# Patient Record
Sex: FEMALE | Race: WHITE | NOT HISPANIC OR LATINO | Employment: FULL TIME | URBAN - METROPOLITAN AREA
[De-identification: names, ages, dates, MRNs, and addresses within clinical notes are randomized per-mention and may not be internally consistent; named-entity substitution may affect disease eponyms.]

---

## 2019-07-26 ENCOUNTER — HOSPITAL ENCOUNTER (OUTPATIENT)
Facility: MEDICAL CENTER | Age: 24
End: 2019-07-26
Attending: NURSE PRACTITIONER
Payer: COMMERCIAL

## 2019-07-26 ENCOUNTER — EH NON-PROVIDER (OUTPATIENT)
Dept: OCCUPATIONAL MEDICINE | Facility: CLINIC | Age: 24
End: 2019-07-26

## 2019-07-26 ENCOUNTER — EMPLOYEE HEALTH (OUTPATIENT)
Dept: OCCUPATIONAL MEDICINE | Facility: CLINIC | Age: 24
End: 2019-07-26

## 2019-07-26 VITALS
DIASTOLIC BLOOD PRESSURE: 84 MMHG | SYSTOLIC BLOOD PRESSURE: 118 MMHG | HEART RATE: 88 BPM | OXYGEN SATURATION: 100 % | WEIGHT: 178 LBS | RESPIRATION RATE: 14 BRPM | HEIGHT: 62 IN | BODY MASS INDEX: 32.76 KG/M2

## 2019-07-26 DIAGNOSIS — Z02.89 VISIT FOR OCCUPATIONAL HEALTH EXAMINATION: ICD-10-CM

## 2019-07-26 DIAGNOSIS — Z02.1 PRE-EMPLOYMENT HEALTH SCREENING EXAMINATION: ICD-10-CM

## 2019-07-26 DIAGNOSIS — Z02.89 VISIT FOR OCCUPATIONAL HEALTH EXAMINATION: Primary | ICD-10-CM

## 2019-07-26 LAB
AMP AMPHETAMINE: NORMAL
BAR BARBITURATES: NORMAL
BZO BENZODIAZEPINES: NORMAL
COC COCAINE: NORMAL
INT CON NEG: NORMAL
INT CON POS: NORMAL
MDMA ECSTASY: NORMAL
MET METHAMPHETAMINES: NORMAL
MTD METHADONE: NORMAL
OPI OPIATES: NORMAL
OXY OXYCODONE: NORMAL
PCP PHENCYCLIDINE: NORMAL
POC URINE DRUG SCREEN OCDRS: NORMAL
THC: NORMAL

## 2019-07-26 PROCEDURE — 8915 PR COMPREHENSIVE PHYSICAL: Performed by: NURSE PRACTITIONER

## 2019-07-26 PROCEDURE — 86480 TB TEST CELL IMMUN MEASURE: CPT | Performed by: NURSE PRACTITIONER

## 2019-07-26 PROCEDURE — 80305 DRUG TEST PRSMV DIR OPT OBS: CPT | Performed by: NURSE PRACTITIONER

## 2019-07-26 RX ORDER — ONDANSETRON 8 MG/1
TABLET, ORALLY DISINTEGRATING ORAL
Refills: 1 | COMMUNITY
Start: 2019-05-17 | End: 2019-11-22

## 2019-07-26 RX ORDER — ALBUTEROL SULFATE 90 UG/1
AEROSOL, METERED RESPIRATORY (INHALATION)
Refills: 6 | COMMUNITY
Start: 2019-06-29 | End: 2019-11-22

## 2019-07-26 RX ORDER — EPINEPHRINE 0.3 MG/.3ML
INJECTION SUBCUTANEOUS
COMMUNITY
End: 2020-03-05 | Stop reason: SDUPTHER

## 2019-07-26 RX ORDER — SULFAMETHOXAZOLE AND TRIMETHOPRIM 200; 40 MG/5ML; MG/5ML
SUSPENSION ORAL
Refills: 0 | COMMUNITY
Start: 2019-05-17 | End: 2019-11-22

## 2019-07-26 RX ORDER — ALBUTEROL SULFATE 2.5 MG/3ML
SOLUTION RESPIRATORY (INHALATION)
COMMUNITY
End: 2020-02-07

## 2019-07-26 RX ORDER — ALBUTEROL SULFATE 90 UG/1
AEROSOL, METERED RESPIRATORY (INHALATION)
COMMUNITY
Start: 2019-01-24 | End: 2019-11-22 | Stop reason: SDUPTHER

## 2019-07-26 RX ORDER — BETAMETHASONE DIPROPIONATE 0.5 MG/G
CREAM TOPICAL
COMMUNITY
End: 2022-09-15

## 2019-07-26 RX ORDER — LEVONORGESTREL AND ETHINYL ESTRADIOL 0.15-0.03
KIT ORAL
Refills: 1 | COMMUNITY
Start: 2019-06-05 | End: 2019-11-22 | Stop reason: SDUPTHER

## 2019-07-26 RX ORDER — LEVONORGESTREL AND ETHINYL ESTRADIOL 0.15-0.03
KIT ORAL
COMMUNITY
End: 2019-11-22

## 2019-07-26 RX ORDER — ALBUTEROL SULFATE 90 UG/1
AEROSOL, METERED RESPIRATORY (INHALATION)
COMMUNITY
Start: 2018-07-16 | End: 2019-11-22

## 2019-07-26 RX ORDER — OXYCODONE HYDROCHLORIDE AND ACETAMINOPHEN 5; 325 MG/1; MG/1
TABLET ORAL
Refills: 0 | COMMUNITY
Start: 2019-05-17 | End: 2019-11-22

## 2019-07-29 LAB
GAMMA INTERFERON BACKGROUND BLD IA-ACNC: 0.02 IU/ML
M TB IFN-G BLD-IMP: NEGATIVE
M TB IFN-G CD4+ BCKGRND COR BLD-ACNC: 0.01 IU/ML
MITOGEN IGNF BCKGRD COR BLD-ACNC: >10 IU/ML
QFT TB2 - NIL TBQ2: 0 IU/ML

## 2019-08-09 ENCOUNTER — EH NON-PROVIDER (OUTPATIENT)
Dept: OCCUPATIONAL MEDICINE | Facility: CLINIC | Age: 24
End: 2019-08-09

## 2019-08-09 ENCOUNTER — APPOINTMENT (OUTPATIENT)
Dept: OCCUPATIONAL MEDICINE | Facility: CLINIC | Age: 24
End: 2019-08-09

## 2019-08-09 DIAGNOSIS — Z71.85 IMMUNIZATION COUNSELING: ICD-10-CM

## 2019-08-20 ENCOUNTER — HOSPITAL ENCOUNTER (OUTPATIENT)
Facility: MEDICAL CENTER | Age: 24
End: 2019-08-20
Payer: COMMERCIAL

## 2019-08-20 LAB
BDY FAT % MEASURED: 33.6 %
BP DIAS: 78 MMHG
BP SYS: 122 MMHG
CHOLEST SERPL-MCNC: 175 MG/DL (ref 100–199)
DIABETES HTDIA: NO
EVENT NAME HTEVT: NORMAL
FASTING HTFAS: YES
FASTING STATUS PATIENT QL REPORTED: NORMAL
GLUCOSE SERPL-MCNC: 88 MG/DL (ref 65–99)
HDLC SERPL-MCNC: 34 MG/DL
HYPERTENSION HTHYP: NO
LDLC SERPL CALC-MCNC: 114 MG/DL
SCREENING LOC CITY HTCIT: NORMAL
SCREENING LOC STATE HTSTA: NORMAL
SCREENING LOCATION HTLOC: NORMAL
SMOKING HTSMO: YES
SUBSCRIBER ID HTSID: NORMAL
TRIGL SERPL-MCNC: 137 MG/DL (ref 0–149)

## 2019-09-19 ENCOUNTER — IMMUNIZATION (OUTPATIENT)
Dept: OCCUPATIONAL MEDICINE | Facility: CLINIC | Age: 24
End: 2019-09-19

## 2019-09-19 DIAGNOSIS — Z23 NEED FOR VACCINATION: ICD-10-CM

## 2019-09-19 PROCEDURE — 90686 IIV4 VACC NO PRSV 0.5 ML IM: CPT | Performed by: PREVENTIVE MEDICINE

## 2019-10-08 ENCOUNTER — TELEPHONE (OUTPATIENT)
Dept: SCHEDULING | Facility: IMAGING CENTER | Age: 24
End: 2019-10-08

## 2019-11-22 ENCOUNTER — HOSPITAL ENCOUNTER (OUTPATIENT)
Facility: MEDICAL CENTER | Age: 24
End: 2019-11-22
Attending: FAMILY MEDICINE
Payer: COMMERCIAL

## 2019-11-22 ENCOUNTER — HOSPITAL ENCOUNTER (OUTPATIENT)
Dept: LAB | Facility: MEDICAL CENTER | Age: 24
End: 2019-11-22
Attending: FAMILY MEDICINE
Payer: COMMERCIAL

## 2019-11-22 ENCOUNTER — OFFICE VISIT (OUTPATIENT)
Dept: MEDICAL GROUP | Facility: LAB | Age: 24
End: 2019-11-22
Payer: COMMERCIAL

## 2019-11-22 VITALS
DIASTOLIC BLOOD PRESSURE: 66 MMHG | HEIGHT: 62 IN | SYSTOLIC BLOOD PRESSURE: 112 MMHG | OXYGEN SATURATION: 96 % | TEMPERATURE: 98 F | WEIGHT: 196 LBS | HEART RATE: 95 BPM | BODY MASS INDEX: 36.07 KG/M2

## 2019-11-22 DIAGNOSIS — R23.2 HOT FLASHES: ICD-10-CM

## 2019-11-22 DIAGNOSIS — Z23 NEED FOR VACCINATION: ICD-10-CM

## 2019-11-22 DIAGNOSIS — Z11.3 SCREEN FOR STD (SEXUALLY TRANSMITTED DISEASE): ICD-10-CM

## 2019-11-22 DIAGNOSIS — G47.09 OTHER INSOMNIA: ICD-10-CM

## 2019-11-22 DIAGNOSIS — J45.20 MILD INTERMITTENT ASTHMA WITHOUT COMPLICATION: ICD-10-CM

## 2019-11-22 LAB
ALBUMIN SERPL BCP-MCNC: 4 G/DL (ref 3.2–4.9)
ALBUMIN/GLOB SERPL: 1.2 G/DL
ALP SERPL-CCNC: 82 U/L (ref 30–99)
ALT SERPL-CCNC: 23 U/L (ref 2–50)
ANION GAP SERPL CALC-SCNC: 9 MMOL/L (ref 0–11.9)
AST SERPL-CCNC: 19 U/L (ref 12–45)
BASOPHILS # BLD AUTO: 0.5 % (ref 0–1.8)
BASOPHILS # BLD: 0.05 K/UL (ref 0–0.12)
BILIRUB SERPL-MCNC: 0.3 MG/DL (ref 0.1–1.5)
BUN SERPL-MCNC: 13 MG/DL (ref 8–22)
CALCIUM SERPL-MCNC: 9 MG/DL (ref 8.5–10.5)
CHLORIDE SERPL-SCNC: 102 MMOL/L (ref 96–112)
CO2 SERPL-SCNC: 27 MMOL/L (ref 20–33)
CREAT SERPL-MCNC: 0.68 MG/DL (ref 0.5–1.4)
EOSINOPHIL # BLD AUTO: 0.31 K/UL (ref 0–0.51)
EOSINOPHIL NFR BLD: 3.2 % (ref 0–6.9)
ERYTHROCYTE [DISTWIDTH] IN BLOOD BY AUTOMATED COUNT: 40.7 FL (ref 35.9–50)
FASTING STATUS PATIENT QL REPORTED: NORMAL
GLOBULIN SER CALC-MCNC: 3.3 G/DL (ref 1.9–3.5)
GLUCOSE SERPL-MCNC: 97 MG/DL (ref 65–99)
HCT VFR BLD AUTO: 45 % (ref 37–47)
HGB BLD-MCNC: 15.4 G/DL (ref 12–16)
IMM GRANULOCYTES # BLD AUTO: 0.03 K/UL (ref 0–0.11)
IMM GRANULOCYTES NFR BLD AUTO: 0.3 % (ref 0–0.9)
LYMPHOCYTES # BLD AUTO: 2.61 K/UL (ref 1–4.8)
LYMPHOCYTES NFR BLD: 27.2 % (ref 22–41)
MCH RBC QN AUTO: 29.7 PG (ref 27–33)
MCHC RBC AUTO-ENTMCNC: 34.2 G/DL (ref 33.6–35)
MCV RBC AUTO: 86.7 FL (ref 81.4–97.8)
MONOCYTES # BLD AUTO: 0.51 K/UL (ref 0–0.85)
MONOCYTES NFR BLD AUTO: 5.3 % (ref 0–13.4)
NEUTROPHILS # BLD AUTO: 6.08 K/UL (ref 2–7.15)
NEUTROPHILS NFR BLD: 63.5 % (ref 44–72)
NRBC # BLD AUTO: 0 K/UL
NRBC BLD-RTO: 0 /100 WBC
PLATELET # BLD AUTO: 416 K/UL (ref 164–446)
PMV BLD AUTO: 8.6 FL (ref 9–12.9)
POTASSIUM SERPL-SCNC: 3.9 MMOL/L (ref 3.6–5.5)
PROT SERPL-MCNC: 7.3 G/DL (ref 6–8.2)
RBC # BLD AUTO: 5.19 M/UL (ref 4.2–5.4)
SODIUM SERPL-SCNC: 138 MMOL/L (ref 135–145)
TSH SERPL DL<=0.005 MIU/L-ACNC: 0.87 UIU/ML (ref 0.38–5.33)
WBC # BLD AUTO: 9.6 K/UL (ref 4.8–10.8)

## 2019-11-22 PROCEDURE — 90732 PPSV23 VACC 2 YRS+ SUBQ/IM: CPT | Performed by: FAMILY MEDICINE

## 2019-11-22 PROCEDURE — 36415 COLL VENOUS BLD VENIPUNCTURE: CPT

## 2019-11-22 PROCEDURE — 84443 ASSAY THYROID STIM HORMONE: CPT

## 2019-11-22 PROCEDURE — 90621 MENB-FHBP VACC 2/3 DOSE IM: CPT | Performed by: FAMILY MEDICINE

## 2019-11-22 PROCEDURE — 90472 IMMUNIZATION ADMIN EACH ADD: CPT | Performed by: FAMILY MEDICINE

## 2019-11-22 PROCEDURE — 99204 OFFICE O/P NEW MOD 45 MIN: CPT | Mod: 25 | Performed by: FAMILY MEDICINE

## 2019-11-22 PROCEDURE — 90651 9VHPV VACCINE 2/3 DOSE IM: CPT | Performed by: FAMILY MEDICINE

## 2019-11-22 PROCEDURE — 90471 IMMUNIZATION ADMIN: CPT | Performed by: FAMILY MEDICINE

## 2019-11-22 PROCEDURE — 85025 COMPLETE CBC W/AUTO DIFF WBC: CPT

## 2019-11-22 PROCEDURE — 80053 COMPREHEN METABOLIC PANEL: CPT

## 2019-11-22 PROCEDURE — 87491 CHLMYD TRACH DNA AMP PROBE: CPT

## 2019-11-22 PROCEDURE — 87591 N.GONORRHOEAE DNA AMP PROB: CPT

## 2019-11-22 RX ORDER — ALBUTEROL SULFATE 90 UG/1
1-2 AEROSOL, METERED RESPIRATORY (INHALATION) EVERY 6 HOURS PRN
Qty: 8.5 G | Refills: 3 | Status: SHIPPED
Start: 2019-11-22 | End: 2020-01-07 | Stop reason: SDUPTHER

## 2019-11-22 RX ORDER — LEVONORGESTREL AND ETHINYL ESTRADIOL 0.15-0.03
1 KIT ORAL DAILY
Qty: 28 TAB | Refills: 11 | Status: SHIPPED
Start: 2019-11-22 | End: 2020-01-07 | Stop reason: SDUPTHER

## 2019-11-22 SDOH — HEALTH STABILITY: MENTAL HEALTH: HOW MANY STANDARD DRINKS CONTAINING ALCOHOL DO YOU HAVE ON A TYPICAL DAY?: 1 OR 2

## 2019-11-22 SDOH — HEALTH STABILITY: MENTAL HEALTH: HOW OFTEN DO YOU HAVE 6 OR MORE DRINKS ON ONE OCCASION?: NEVER

## 2019-11-22 SDOH — HEALTH STABILITY: MENTAL HEALTH: HOW OFTEN DO YOU HAVE A DRINK CONTAINING ALCOHOL?: 2-4 TIMES A MONTH

## 2019-11-22 ASSESSMENT — ENCOUNTER SYMPTOMS
NAUSEA: 0
SORE THROAT: 0
PALPITATIONS: 0
ABDOMINAL PAIN: 0
FOCAL WEAKNESS: 0
DIZZINESS: 0
DEPRESSION: 0
BLURRED VISION: 0
VOMITING: 0
HEADACHES: 0
SHORTNESS OF BREATH: 0
COUGH: 0
MYALGIAS: 0
WHEEZING: 0
CONSTIPATION: 0
DIARRHEA: 0

## 2019-11-22 ASSESSMENT — PATIENT HEALTH QUESTIONNAIRE - PHQ9: CLINICAL INTERPRETATION OF PHQ2 SCORE: 0

## 2019-11-22 NOTE — PROGRESS NOTES
"Chelsea Carrillo is a 24 y.o. female here to establish care and discuss hot flashes and sleep.    HPI:      Hot flashes  Reports 10-15 minutes of feeling very hot, face flushed. Started 3-4 months ago, happens when sitting at work or home. Not triggered by stress or anxiety. Not triggered by food or drink. No new medications. No  history of thyroid issues.    No new nightsweats or fatigue, no hair/skin changes, does report occasional \"heart flutter\" or skipping beat, but this does not occur with hot flashes.  Has regular menstrual periods, normal bleeding, last ~ 5 days. She is on OCP, on for a year or longer.    Weight  Has gym membership, just doesn't use it  Eats in middle of night and is planning on cutting this out    Sleep  Chronic issue.  Reports she will wake up every 2-3 hours throughout the night, does not wake up feeling rested.  Significant daytime fatigue.  Does report snoring.  Sleep study in past showed mild apnea, she had her tonsils out after this and did have short period of improved sleep.  2 cups of coffee in the morning, no afternoon caffeine.  Rare alcohol.    Current medicines (including changes today)  Current Outpatient Medications   Medication Sig Dispense Refill   • albuterol (VENTOLIN HFA) 108 (90 Base) MCG/ACT Aero Soln inhalation aerosol VENTOLIN  (90 Base) MCG/ACT AERS     • albuterol (PROVENTIL) 2.5mg/3ml Nebu Soln solution for nebulization ALBUTEROL SULFATE (2.5 MG/3ML) 0.083% NEBU     • albuterol (PROAIR HFA) 108 (90 Base) MCG/ACT Aero Soln inhalation aerosol PROAIR  (90 Base) MCG/ACT AERS     • betamethasone dipropionate (DIPROLENE) 0.05 % Cream BETAMETHASONE DIPROPIONATE 0.05 % CREA     • EPINEPHrine (EPIPEN 2-GRISEL) 0.3 MG/0.3ML Solution Auto-injector solution for injection EPIPEN 2-GRISEL 0.3 MG/0.3ML SOAJ     • ADVAIR DISKUS 250-50 MCG/DOSE AEROSOL POWDER, BREATH ACTIVATED      • KURVELO 0.15-30 MG-MCG per tablet TK 1 T PO QD  1   • fluticasone-salmeterol (ADVAIR DISKUS) " 250-50 MCG/DOSE AEROSOL POWDER, BREATH ACTIVATED ADVAIR DISKUS 250-50 MCG/DOSE AEPB     • levonorgestrel-ethinyl estradiol (LEVORA 0.15/30, 28,) 0.15-30 MG-MCG per tablet LEVORA 0.15/30 (28) 0.15-30 MG-MCG TABS     • VENTOLIN  (90 Base) MCG/ACT Aero Soln inhalation aerosol INAHLE 1 PUFF PO Q 2 H PRN  6   • ondansetron (ZOFRAN ODT) 8 MG TABLET DISPERSIBLE DISSOLVE 1 T PO Q 8 H PRN NV  1   • oxyCODONE-acetaminophen (PERCOCET) 5-325 MG Tab TK 1 TO 2 T PO Q 4 H PRN P FOR 7 DAYS.  0   • SULFATRIM PEDIATRIC 200-40 MG/5ML Suspension TK 10 ML PO BID TAT  0     No current facility-administered medications for this visit.      She  has no past medical history on file.  She  has no past surgical history on file.  Social History     Tobacco Use   • Smoking status: Former Smoker     Packs/day: 0.00   • Smokeless tobacco: Never Used   Substance Use Topics   • Alcohol use: Yes     Frequency: 2-4 times a month     Drinks per session: 1 or 2     Binge frequency: Never     Comment: 1x a week   • Drug use: No     Social History     Patient does not qualify to have social determinant information on file (likely too young).   Social History Narrative    Works in [employer]     No family history on file.  No family status information on file.       ROS  Review of Systems   Constitutional: Positive for malaise/fatigue.   HENT: Negative for congestion and sore throat.    Eyes: Negative for blurred vision.   Respiratory: Negative for cough, shortness of breath and wheezing.    Cardiovascular: Negative for chest pain and palpitations.   Gastrointestinal: Negative for abdominal pain, constipation, diarrhea, nausea and vomiting.   Genitourinary: Negative for dysuria and urgency.   Musculoskeletal: Negative for joint pain and myalgias.   Skin: Negative for rash.   Neurological: Negative for dizziness, focal weakness and headaches.   Psychiatric/Behavioral: Negative for depression.   All other systems reviewed and are  "negative.        Objective:     Physical Exam:  /66 (BP Location: Right arm, Patient Position: Sitting, BP Cuff Size: Adult)   Pulse 95   Temp 36.7 °C (98 °F) (Temporal)   Ht 1.575 m (5' 2\")   Wt 88.9 kg (196 lb)   SpO2 96%  Body mass index is 35.85 kg/m².  Constitutional: Alert. Well appearing. No distress.  Skin: Warm, dry, good turgor, no visible rashes.  Eye: Equal, round and reactive to light, conjunctiva clear, lids normal.  ENMT: Moist mucous membranes. Normal dentition. Oropharynx clear.  Neck: Trachea midline, no masses, no thyromegaly. No cervical or supraclavicular lymphadenopathy.  Respiratory: Normal effort. Lungs are clear to auscultation bilaterally.  Cardiovascular: Regular rate and rhythm. Normal S1/S2. No murmurs, rubs or gallops.   Abdomen: Soft, non-tender, non-distended. No masses, no hepatosplenomegaly.  Neuro: Moves all four extremities. No facial droop.  Psych: Answers questions appropriately. Normal affect and mood.    Assessment and Plan:     1. Hot flashes  Chronic issue.  She reports 3 to 4 months of continued hot flashes last 10 to 15 minutes with unknown trigger.  No associated symptoms.  No new medications.  She does report her prior PCP worked this up, records are currently unavailable.  Unclear etiology at this point, check thyroid and basic labs.  Further work-up if this is not improving.  - CBC WITH DIFFERENTIAL; Future  - Comp Metabolic Panel; Future  - TSH WITH REFLEX TO FT4; Future    2. Other insomnia  Chronic issue.  Only sleeping for 2 to 3-hour intervals with significant daytime fatigue.  Did have prior sleep study with mild apnea and had mild improvement in symptoms after tonsillectomy.  However, she does report continued snoring.  Concern for sleep apnea.  - REFERRAL TO SLEEP STUDIES    3. Body mass index 34.0-34.9, adult  Chronic issue.  Discussed diet and exercise modification.  Did offer referral to health improvement clinic, she defers at this point.    4. " Mild intermittent asthma without complication  Chronic issue, stable.  Advair and albuterol refilled.    5. Screen for STD (sexually transmitted disease)  - CHLAMYDIA/GC PCR URINE OR SWAB; Future    6. Need for vaccination  - 9VHPV Vaccine 2-3 Dose (GARDASIL 9)  - Meningococcal Vaccine Serogroup B 2-3 Dose (TRUMENBA)  - PneumoVax PPV23 =>1yo    Follow up: Return in about 4 months (around 3/22/2020) for sleep, pap.         PLEASE NOTE: This note was created using voice recognition software.

## 2019-11-22 NOTE — LETTER
[employer] [facility]  Cornelio Hernandez M.D.  [address]  [place] [address]  Fax: [phone]   Authorization for Release/Disclosure of   Protected Health Information   Name: CHELSEA MOLINA : 1995 SSN: [id number]   Address: [address] [place] [address]  [place] [address] Phone:    [phone] (home)    I authorize the entity listed below to release/disclose the PHI below to:   [facility]/Cornelio Hernandez M.D. and Cornelio Hernandez M.D.   Provider or Entity Name:     Address   City, State, Zip   Phone:      Fax:     Reason for request: continuity of care   Information to be released:    [  ] LAST COLONOSCOPY,  including any PATH REPORT and follow-up  [  ] LAST FIT/COLOGUARD RESULT [  ] LAST DEXA  [  ] LAST MAMMOGRAM  [  ] LAST PAP  [  ] LAST LABS [  ] RETINA EXAM REPORT  [  ] IMMUNIZATION RECORDS  [ x ] Release all info      [  ] Check here and initial the line next to each item to release ALL health information INCLUDING  _____ Care and treatment for drug and / or alcohol abuse  _____ HIV testing, infection status, or AIDS  _____ Genetic Testing    DATES OF SERVICE OR TIME PERIOD TO BE DISCLOSED: _____________  I understand and acknowledge that:  * This Authorization may be revoked at any time by you in writing, except if your health information has already been used or disclosed.  * Your health information that will be used or disclosed as a result of you signing this authorization could be re-disclosed by the recipient. If this occurs, your re-disclosed health information may no longer be protected by State or Federal laws.  * You may refuse to sign this Authorization. Your refusal will not affect your ability to obtain treatment.  * This Authorization becomes effective upon signing and will  on (date) __________.      If no date is indicated, this Authorization will  one (1) year from the signature date.    Name: Chelsea Molina    Signature:   Date:     2019       PLEASE  FAX REQUESTED RECORDS BACK TO: [phone]

## 2019-11-23 LAB
C TRACH DNA SPEC QL NAA+PROBE: NEGATIVE
N GONORRHOEA DNA SPEC QL NAA+PROBE: NEGATIVE
SPECIMEN SOURCE: NORMAL

## 2020-01-07 RX ORDER — ALBUTEROL SULFATE 90 UG/1
1-2 AEROSOL, METERED RESPIRATORY (INHALATION) EVERY 6 HOURS PRN
Qty: 8.5 G | Refills: 3 | Status: SHIPPED | OUTPATIENT
Start: 2020-01-07 | End: 2020-07-10

## 2020-01-07 RX ORDER — LEVONORGESTREL AND ETHINYL ESTRADIOL 0.15-0.03
1 KIT ORAL DAILY
Qty: 28 TAB | Refills: 11 | Status: SHIPPED | OUTPATIENT
Start: 2020-01-07 | End: 2021-10-19

## 2020-01-07 NOTE — TELEPHONE ENCOUNTER
Was the patient seen in the last year in this department? Yes    Does patient have an active prescription for medications requested? Yes    Received Request Via: Patient   Insurance requires [facility] for 90 days supply.

## 2020-02-03 ENCOUNTER — OFFICE VISIT (OUTPATIENT)
Dept: URGENT CARE | Facility: CLINIC | Age: 25
End: 2020-02-03
Payer: COMMERCIAL

## 2020-02-03 ENCOUNTER — HOSPITAL ENCOUNTER (OUTPATIENT)
Dept: RADIOLOGY | Facility: MEDICAL CENTER | Age: 25
End: 2020-02-03
Attending: PHYSICIAN ASSISTANT
Payer: COMMERCIAL

## 2020-02-03 ENCOUNTER — HOSPITAL ENCOUNTER (OUTPATIENT)
Dept: LAB | Facility: MEDICAL CENTER | Age: 25
End: 2020-02-03
Attending: PHYSICIAN ASSISTANT
Payer: COMMERCIAL

## 2020-02-03 VITALS
HEART RATE: 108 BPM | SYSTOLIC BLOOD PRESSURE: 114 MMHG | OXYGEN SATURATION: 95 % | BODY MASS INDEX: 35.88 KG/M2 | RESPIRATION RATE: 20 BRPM | WEIGHT: 195 LBS | HEIGHT: 62 IN | DIASTOLIC BLOOD PRESSURE: 76 MMHG | TEMPERATURE: 98.2 F

## 2020-02-03 DIAGNOSIS — R11.0 NAUSEA: ICD-10-CM

## 2020-02-03 DIAGNOSIS — R10.31 RIGHT LOWER QUADRANT ABDOMINAL PAIN: ICD-10-CM

## 2020-02-03 LAB
ALBUMIN SERPL BCP-MCNC: 4.3 G/DL (ref 3.2–4.9)
ALBUMIN/GLOB SERPL: 1.1 G/DL
ALP SERPL-CCNC: 73 U/L (ref 30–99)
ALT SERPL-CCNC: 38 U/L (ref 2–50)
ANION GAP SERPL CALC-SCNC: 9 MMOL/L (ref 0–11.9)
APPEARANCE UR: CLEAR
AST SERPL-CCNC: 24 U/L (ref 12–45)
BASOPHILS # BLD AUTO: 0.3 % (ref 0–1.8)
BASOPHILS # BLD: 0.04 K/UL (ref 0–0.12)
BILIRUB SERPL-MCNC: 0.4 MG/DL (ref 0.1–1.5)
BILIRUB UR STRIP-MCNC: NORMAL MG/DL
BUN SERPL-MCNC: 10 MG/DL (ref 8–22)
CALCIUM SERPL-MCNC: 9.9 MG/DL (ref 8.5–10.5)
CHLORIDE SERPL-SCNC: 104 MMOL/L (ref 96–112)
CO2 SERPL-SCNC: 25 MMOL/L (ref 20–33)
COLOR UR AUTO: NORMAL
CREAT SERPL-MCNC: 0.81 MG/DL (ref 0.5–1.4)
EOSINOPHIL # BLD AUTO: 0.1 K/UL (ref 0–0.51)
EOSINOPHIL NFR BLD: 0.8 % (ref 0–6.9)
ERYTHROCYTE [DISTWIDTH] IN BLOOD BY AUTOMATED COUNT: 41.4 FL (ref 35.9–50)
GLOBULIN SER CALC-MCNC: 3.8 G/DL (ref 1.9–3.5)
GLUCOSE SERPL-MCNC: 107 MG/DL (ref 65–99)
GLUCOSE UR STRIP.AUTO-MCNC: NORMAL MG/DL
HCT VFR BLD AUTO: 44.9 % (ref 37–47)
HGB BLD-MCNC: 15.4 G/DL (ref 12–16)
IMM GRANULOCYTES # BLD AUTO: 0.04 K/UL (ref 0–0.11)
IMM GRANULOCYTES NFR BLD AUTO: 0.3 % (ref 0–0.9)
INT CON NEG: NEGATIVE
INT CON POS: POSITIVE
KETONES UR STRIP.AUTO-MCNC: NORMAL MG/DL
LEUKOCYTE ESTERASE UR QL STRIP.AUTO: NORMAL
LYMPHOCYTES # BLD AUTO: 2.37 K/UL (ref 1–4.8)
LYMPHOCYTES NFR BLD: 19.8 % (ref 22–41)
MCH RBC QN AUTO: 29.6 PG (ref 27–33)
MCHC RBC AUTO-ENTMCNC: 34.3 G/DL (ref 33.6–35)
MCV RBC AUTO: 86.3 FL (ref 81.4–97.8)
MONOCYTES # BLD AUTO: 0.42 K/UL (ref 0–0.85)
MONOCYTES NFR BLD AUTO: 3.5 % (ref 0–13.4)
NEUTROPHILS # BLD AUTO: 8.97 K/UL (ref 2–7.15)
NEUTROPHILS NFR BLD: 75.3 % (ref 44–72)
NITRITE UR QL STRIP.AUTO: NORMAL
NRBC # BLD AUTO: 0 K/UL
NRBC BLD-RTO: 0 /100 WBC
PH UR STRIP.AUTO: 7.5 [PH] (ref 5–8)
PLATELET # BLD AUTO: 431 K/UL (ref 164–446)
PMV BLD AUTO: 8.6 FL (ref 9–12.9)
POC URINE PREGNANCY TEST: NEGATIVE
POTASSIUM SERPL-SCNC: 4.1 MMOL/L (ref 3.6–5.5)
PROT SERPL-MCNC: 8.1 G/DL (ref 6–8.2)
PROT UR QL STRIP: NORMAL MG/DL
RBC # BLD AUTO: 5.2 M/UL (ref 4.2–5.4)
RBC UR QL AUTO: NORMAL
SODIUM SERPL-SCNC: 138 MMOL/L (ref 135–145)
SP GR UR STRIP.AUTO: 1.01
UROBILINOGEN UR STRIP-MCNC: 2 MG/DL
WBC # BLD AUTO: 11.9 K/UL (ref 4.8–10.8)

## 2020-02-03 PROCEDURE — 700117 HCHG RX CONTRAST REV CODE 255: Performed by: PHYSICIAN ASSISTANT

## 2020-02-03 PROCEDURE — 85025 COMPLETE CBC W/AUTO DIFF WBC: CPT

## 2020-02-03 PROCEDURE — 80053 COMPREHEN METABOLIC PANEL: CPT

## 2020-02-03 PROCEDURE — 81002 URINALYSIS NONAUTO W/O SCOPE: CPT | Performed by: PHYSICIAN ASSISTANT

## 2020-02-03 PROCEDURE — 36415 COLL VENOUS BLD VENIPUNCTURE: CPT

## 2020-02-03 PROCEDURE — 81025 URINE PREGNANCY TEST: CPT | Performed by: PHYSICIAN ASSISTANT

## 2020-02-03 PROCEDURE — 74177 CT ABD & PELVIS W/CONTRAST: CPT

## 2020-02-03 PROCEDURE — 99204 OFFICE O/P NEW MOD 45 MIN: CPT | Performed by: PHYSICIAN ASSISTANT

## 2020-02-03 RX ORDER — ONDANSETRON 4 MG/1
4 TABLET, ORALLY DISINTEGRATING ORAL EVERY 6 HOURS PRN
Qty: 10 TAB | Refills: 0 | Status: SHIPPED
Start: 2020-02-03 | End: 2020-02-07

## 2020-02-03 RX ADMIN — IOHEXOL 100 ML: 350 INJECTION, SOLUTION INTRAVENOUS at 16:33

## 2020-02-03 RX ADMIN — IOHEXOL 50 ML: 240 INJECTION, SOLUTION INTRATHECAL; INTRAVASCULAR; INTRAVENOUS; ORAL at 16:33

## 2020-02-03 ASSESSMENT — PAIN SCALES - GENERAL: PAINLEVEL: 4=SLIGHT-MODERATE PAIN

## 2020-02-03 ASSESSMENT — ENCOUNTER SYMPTOMS
EYE DISCHARGE: 0
NAUSEA: 1
VOMITING: 1
SHORTNESS OF BREATH: 0
FEVER: 0
HEADACHES: 0
HEMATOCHEZIA: 0
FLANK PAIN: 0
MYALGIAS: 0
COUGH: 0
CONSTIPATION: 0
EYE REDNESS: 0
SORE THROAT: 0
DIARRHEA: 0

## 2020-02-03 NOTE — PROGRESS NOTES
Subjective:      Cheslea Carrillo is a 25 y.o. female who presents with RLQ Pain (nauseous, vomiting, dull pain and worsens as she eats x 4 days)        RLQ Pain   This is a new problem. Episode onset: x 4 days ago. The problem occurs constantly. The problem has been unchanged. The pain is located in the RLQ. The pain is moderate. The quality of the pain is dull. The abdominal pain does not radiate. Associated symptoms include nausea and vomiting (The patient reports 3 intermittent episodes of vomiting.). Pertinent negatives include no constipation, diarrhea, dysuria, fever, headaches, hematochezia, hematuria or myalgias. The pain is aggravated by eating. The pain is relieved by nothing. She has tried nothing for the symptoms.     PMH:  has a past medical history of Anxiety (2015) and Depression (2015).  MEDS:   Current Outpatient Medications:   •  KURVELO 0.15-30 MG-MCG per tablet, Take 1 Tab by mouth every day., Disp: 28 Tab, Rfl: 11  •  albuterol (PROAIR HFA) 108 (90 Base) MCG/ACT Aero Soln inhalation aerosol, Inhale 1-2 Puffs by mouth every 6 hours as needed for Shortness of Breath., Disp: 8.5 g, Rfl: 3  •  ADVAIR DISKUS 250-50 MCG/DOSE AEROSOL POWDER, BREATH ACTIVATED, Inhale 1 Puff by mouth 2 times a day., Disp: 1 Inhaler, Rfl: 11  •  EPINEPHrine (EPIPEN 2-GRISEL) 0.3 MG/0.3ML Solution Auto-injector solution for injection, EPIPEN 2-GRISEL 0.3 MG/0.3ML SOAJ, Disp: , Rfl:   •  albuterol (PROVENTIL) 2.5mg/3ml Nebu Soln solution for nebulization, ALBUTEROL SULFATE (2.5 MG/3ML) 0.083% NEBU, Disp: , Rfl:   •  betamethasone dipropionate (DIPROLENE) 0.05 % Cream, BETAMETHASONE DIPROPIONATE 0.05 % CREA, Disp: , Rfl:   ALLERGIES:   Allergies   Allergen Reactions   • Penicillin G      Other reaction(s): unknown- reaction happened when she was a little girl     SURGHX: History reviewed. No pertinent surgical history.  SOCHX:  reports that she has quit smoking. She smoked 0.00 packs per day. She has never used smokeless tobacco.  "She reports current alcohol use. She reports that she does not use drugs.  FH: Family history was reviewed, no pertinent findings to report    Review of Systems   Constitutional: Negative for fever.   HENT: Negative for congestion, ear pain and sore throat.    Eyes: Negative for discharge and redness.   Respiratory: Negative for cough and shortness of breath.    Cardiovascular: Negative for chest pain and leg swelling.   Gastrointestinal: Positive for nausea and vomiting (The patient reports 3 intermittent episodes of vomiting.). Negative for constipation, diarrhea and hematochezia.   Genitourinary: Negative for dysuria, flank pain and hematuria.   Musculoskeletal: Negative for myalgias.   Skin: Negative for rash.   Neurological: Negative for headaches.   All other systems reviewed and are negative.         Objective:     /76 (BP Location: Right arm, Patient Position: Sitting, BP Cuff Size: Adult long)   Pulse (!) 108   Temp 36.8 °C (98.2 °F) (Temporal)   Resp 20   Ht 1.575 m (5' 2\")   Wt 88.5 kg (195 lb)   LMP 01/17/2020 (Exact Date)   SpO2 95%   Breastfeeding? No   BMI 35.67 kg/m²      Physical Exam  Constitutional:       General: She is not in acute distress.     Appearance: Normal appearance. She is not ill-appearing.   HENT:      Head: Normocephalic and atraumatic.      Right Ear: Tympanic membrane, ear canal and external ear normal.      Left Ear: Tympanic membrane, ear canal and external ear normal.      Nose: Nose normal.      Mouth/Throat:      Mouth: Mucous membranes are moist.      Pharynx: Oropharynx is clear. No posterior oropharyngeal erythema.   Eyes:      Extraocular Movements: Extraocular movements intact.      Conjunctiva/sclera: Conjunctivae normal.   Neck:      Musculoskeletal: Normal range of motion and neck supple.   Cardiovascular:      Rate and Rhythm: Normal rate and regular rhythm.      Heart sounds: Normal heart sounds.   Pulmonary:      Effort: Pulmonary effort is normal. "      Breath sounds: Normal breath sounds.   Abdominal:      General: Bowel sounds are normal.      Palpations: Abdomen is soft.      Tenderness: There is tenderness in the right lower quadrant. There is no right CVA tenderness, left CVA tenderness, guarding or rebound.   Skin:     General: Skin is warm and dry.   Neurological:      Mental Status: She is alert and oriented to person, place, and time.            Progress:  Results for orders placed or performed in visit on 02/03/20   POCT Urinalysis   Result Value Ref Range    POC Color DARK YELLOW Negative    POC Appearance CLEAR Negative    POC Leukocyte Esterase neg Negative    POC Nitrites neg Negative    POC Urobiligen 2.0 Negative (0.2) mg/dL    POC Protein neg Negative mg/dL    POC Urine PH 7.5 5.0 - 8.0    POC Blood TR Negative    POC Specific Gravity 1.015 <1.005 - >1.030    POC Ketones neg Negative mg/dL    POC Bilirubin neg Negative mg/dL    POC Glucose neg Negative mg/dL   POCT Pregnancy   Result Value Ref Range    POC Urine Pregnancy Test NEGATIVE Negative    Internal Control Positive Positive     Internal Control Negative Negative        The first available outpatient CT scan opening was today at 2:45 PM. The patient states she would like to wait for the outpatient CT scan. Instructed the patient to go to the ED if symptoms should worsen while awaiting the outpatient work-up.     CT Abdomen Pelvis with Contrast:     FINDINGS:  Chest Base: Lung bases are clear.     Liver:  Normal.     Gallbladder: Normal     Biliary tract: Nondilated.     Pancreas: Normal.     Spleen: Normal.     Adrenals: Normal.     Kidneys and Collecting Systems:  Normal.     Gastrointestinal tract:  No bowel obstruction. The appendix is normal.     Peritoneum: No free air or free fluid.     Reproductive organs:  Normal.     Bladder:  Normal.     Vessels:  Normal caliber.     Lymph  Nodes:  Shotty mesenteric an inguinal lymph nodes. No pathologic adenopathy by size  criteria.     Abdominal wall: Within normal limits.     Bones:  No acute or aggressive abnormality.     IMPRESSION:        No acute abnormality. No etiology for patient's pain is identified.     Normal appendix.    CBC:   Ref Range & Units 9:45 AM   WBC 4.8 - 10.8 K/uL 11.9High     RBC 4.20 - 5.40 M/uL 5.20    Hemoglobin 12.0 - 16.0 g/dL 15.4    Hematocrit 37.0 - 47.0 % 44.9    MCV 81.4 - 97.8 fL 86.3    MCH 27.0 - 33.0 pg 29.6    MCHC 33.6 - 35.0 g/dL 34.3    RDW 35.9 - 50.0 fL 41.4    Platelet Count 164 - 446 K/uL 431    MPV 9.0 - 12.9 fL 8.6Low     Neutrophils-Polys 44.00 - 72.00 % 75.30High     Lymphocytes 22.00 - 41.00 % 19.80Low     Monocytes 0.00 - 13.40 % 3.50    Eosinophils 0.00 - 6.90 % 0.80    Basophils 0.00 - 1.80 % 0.30    Immature Granulocytes 0.00 - 0.90 % 0.30    Nucleated RBC /100 WBC 0.00    Neutrophils (Absolute) 2.00 - 7.15 K/uL 8.97High         CMP:    Ref Range & Units 9:45 AM   Sodium 135 - 145 mmol/L 138    Potassium 3.6 - 5.5 mmol/L 4.1    Chloride 96 - 112 mmol/L 104    Co2 20 - 33 mmol/L 25    Anion Gap 0.0 - 11.9 9.0    Glucose 65 - 99 mg/dL 107High     Bun 8 - 22 mg/dL 10    Creatinine 0.50 - 1.40 mg/dL 0.81    Calcium 8.5 - 10.5 mg/dL 9.9    AST(SGOT) 12 - 45 U/L 24    ALT(SGPT) 2 - 50 U/L 38    Alkaline Phosphatase 30 - 99 U/L 73    Total Bilirubin 0.1 - 1.5 mg/dL 0.4    Albumin 3.2 - 4.9 g/dL 4.3    Total Protein 6.0 - 8.2 g/dL 8.1    Globulin 1.9 - 3.5 g/dL 3.8High     A-G Ratio g/dL 1.1        Assessment/Plan:     1. Right lower quadrant abdominal pain    - POCT Urinalysis  - POCT Pregnancy  - CT-ABDOMEN-PELVIS WITH; Future  - CBC WITH DIFFERENTIAL; Future  - Comp Metabolic Panel; Future    2. Nausea  - ondansetron (ZOFRAN ODT) 4 MG TABLET DISPERSIBLE; Take 1 Tab by mouth every 6 hours as needed for Nausea.  Dispense: 10 Tab; Refill: 0    The patient's presenting symptoms and physical exam findings are consistent with right lower quadrant abdominal pain with associated nausea.  On  physical exam, the patient had tenderness to the right lower quadrant without guarding or rebound.  The patient's bowel sounds were normal.  The patient's urinalysis today in clinic showed no signs of infection.  The patient's POCT pregnancy test was negative.  A CT abdomen pelvis with contrast was obtained to further evaluate the patient's symptoms and rule out possible appendicitis.  The patient's CT abdomen pelvis showed no acute abnormality with no acute etiology of the patient's pain identified.  The appendix was visualized as normal.  A CBC and CMP were also obtained to further evaluate the patient's symptoms.  The patient's CBC showed a slightly elevated WBC at 11.9.  The patient's CMP was within normal limits.  The cause of the patient's symptoms is unknown at this time, as the patient CT abdomen pelvis showed no acute abnormality.  Will prescribe the patient Zofran for symptomatic relief of her nausea.  Recommend the patient follow-up with her PCP.  Discussed STRICT ED precautions with the patient, she verbalized understanding.    Differential diagnoses, supportive care, and indications for immediate follow-up discussed with patient.   Instructed to return to clinic or nearest emergency department for any change in condition, further concerns, or worsening of symptoms.    OTC Tylenol or Motrin for fever/discomfort.  Drink plenty of fluids  [place] diet  Follow-up with PCP  Return to clinic or go to the ED if symptoms worsen or fail to improve, or if patient should develop worsening/increasing/persistent abdominal pain, nausea/vomiting, diarrhea, urinary symptoms, hematuria, flank pain, decreased p.o. intake, fever/chills, and/or any concerning symptoms.    Discussed plan with the patient, and she agrees to the above.     Please note that this dictation was created using voice recognition software. I have made every reasonable attempt to correct obvious errors, but I expect that there may be errors of grammar  and possibly content that I did not discover before finalizing the note.

## 2020-02-07 ENCOUNTER — HOSPITAL ENCOUNTER (OUTPATIENT)
Dept: LAB | Facility: MEDICAL CENTER | Age: 25
End: 2020-02-07
Attending: FAMILY MEDICINE
Payer: COMMERCIAL

## 2020-02-07 ENCOUNTER — OFFICE VISIT (OUTPATIENT)
Dept: MEDICAL GROUP | Facility: LAB | Age: 25
End: 2020-02-07
Payer: COMMERCIAL

## 2020-02-07 ENCOUNTER — HOSPITAL ENCOUNTER (OUTPATIENT)
Facility: MEDICAL CENTER | Age: 25
End: 2020-02-07
Attending: FAMILY MEDICINE
Payer: COMMERCIAL

## 2020-02-07 VITALS
HEART RATE: 91 BPM | WEIGHT: 190 LBS | OXYGEN SATURATION: 96 % | DIASTOLIC BLOOD PRESSURE: 80 MMHG | TEMPERATURE: 98.3 F | HEIGHT: 62 IN | SYSTOLIC BLOOD PRESSURE: 120 MMHG | RESPIRATION RATE: 12 BRPM | BODY MASS INDEX: 34.96 KG/M2

## 2020-02-07 DIAGNOSIS — R10.9 RIGHT SIDED ABDOMINAL PAIN: ICD-10-CM

## 2020-02-07 LAB
ALBUMIN SERPL BCP-MCNC: 4.2 G/DL (ref 3.2–4.9)
ALBUMIN/GLOB SERPL: 1.3 G/DL
ALP SERPL-CCNC: 71 U/L (ref 30–99)
ALT SERPL-CCNC: 50 U/L (ref 2–50)
ANION GAP SERPL CALC-SCNC: 9 MMOL/L (ref 0–11.9)
AST SERPL-CCNC: 29 U/L (ref 12–45)
BASOPHILS # BLD AUTO: 0.3 % (ref 0–1.8)
BASOPHILS # BLD: 0.03 K/UL (ref 0–0.12)
BILIRUB SERPL-MCNC: 0.4 MG/DL (ref 0.1–1.5)
BUN SERPL-MCNC: 11 MG/DL (ref 8–22)
C TRACH DNA SPEC QL NAA+PROBE: NEGATIVE
CALCIUM SERPL-MCNC: 9.3 MG/DL (ref 8.5–10.5)
CHLORIDE SERPL-SCNC: 103 MMOL/L (ref 96–112)
CO2 SERPL-SCNC: 23 MMOL/L (ref 20–33)
CREAT SERPL-MCNC: 0.67 MG/DL (ref 0.5–1.4)
EOSINOPHIL # BLD AUTO: 0.18 K/UL (ref 0–0.51)
EOSINOPHIL NFR BLD: 1.8 % (ref 0–6.9)
ERYTHROCYTE [DISTWIDTH] IN BLOOD BY AUTOMATED COUNT: 42 FL (ref 35.9–50)
GLOBULIN SER CALC-MCNC: 3.3 G/DL (ref 1.9–3.5)
GLUCOSE SERPL-MCNC: 82 MG/DL (ref 65–99)
HCT VFR BLD AUTO: 43 % (ref 37–47)
HGB BLD-MCNC: 14.9 G/DL (ref 12–16)
IMM GRANULOCYTES # BLD AUTO: 0.03 K/UL (ref 0–0.11)
IMM GRANULOCYTES NFR BLD AUTO: 0.3 % (ref 0–0.9)
INT CON NEG: NEGATIVE
INT CON POS: POSITIVE
LIPASE SERPL-CCNC: 14 U/L (ref 11–82)
LYMPHOCYTES # BLD AUTO: 2.3 K/UL (ref 1–4.8)
LYMPHOCYTES NFR BLD: 23.6 % (ref 22–41)
MCH RBC QN AUTO: 29.7 PG (ref 27–33)
MCHC RBC AUTO-ENTMCNC: 34.7 G/DL (ref 33.6–35)
MCV RBC AUTO: 85.8 FL (ref 81.4–97.8)
MONOCYTES # BLD AUTO: 0.51 K/UL (ref 0–0.85)
MONOCYTES NFR BLD AUTO: 5.2 % (ref 0–13.4)
N GONORRHOEA DNA SPEC QL NAA+PROBE: NEGATIVE
NEUTROPHILS # BLD AUTO: 6.71 K/UL (ref 2–7.15)
NEUTROPHILS NFR BLD: 68.8 % (ref 44–72)
NRBC # BLD AUTO: 0 K/UL
NRBC BLD-RTO: 0 /100 WBC
PLATELET # BLD AUTO: 411 K/UL (ref 164–446)
PMV BLD AUTO: 8.8 FL (ref 9–12.9)
POC URINE PREGNANCY TEST: NEGATIVE
POTASSIUM SERPL-SCNC: 4.2 MMOL/L (ref 3.6–5.5)
PROT SERPL-MCNC: 7.5 G/DL (ref 6–8.2)
RBC # BLD AUTO: 5.01 M/UL (ref 4.2–5.4)
SODIUM SERPL-SCNC: 135 MMOL/L (ref 135–145)
SPECIMEN SOURCE: NORMAL
WBC # BLD AUTO: 9.8 K/UL (ref 4.8–10.8)

## 2020-02-07 PROCEDURE — 36415 COLL VENOUS BLD VENIPUNCTURE: CPT

## 2020-02-07 PROCEDURE — 83690 ASSAY OF LIPASE: CPT

## 2020-02-07 PROCEDURE — 85025 COMPLETE CBC W/AUTO DIFF WBC: CPT

## 2020-02-07 PROCEDURE — 81025 URINE PREGNANCY TEST: CPT | Performed by: FAMILY MEDICINE

## 2020-02-07 PROCEDURE — 99214 OFFICE O/P EST MOD 30 MIN: CPT | Performed by: FAMILY MEDICINE

## 2020-02-07 PROCEDURE — 87591 N.GONORRHOEAE DNA AMP PROB: CPT

## 2020-02-07 PROCEDURE — 80053 COMPREHEN METABOLIC PANEL: CPT

## 2020-02-07 PROCEDURE — 87491 CHLMYD TRACH DNA AMP PROBE: CPT

## 2020-02-07 RX ORDER — EPINEPHRINE 0.3 MG/.3ML
INJECTION SUBCUTANEOUS
COMMUNITY
End: 2020-02-07

## 2020-02-07 RX ORDER — ONDANSETRON 4 MG/1
4 TABLET, FILM COATED ORAL EVERY 4 HOURS PRN
Qty: 30 TAB | Refills: 0 | Status: SHIPPED | OUTPATIENT
Start: 2020-02-07 | End: 2020-02-21

## 2020-02-07 ASSESSMENT — PATIENT HEALTH QUESTIONNAIRE - PHQ9
SUM OF ALL RESPONSES TO PHQ QUESTIONS 1-9: 9
5. POOR APPETITE OR OVEREATING: 0 - NOT AT ALL
CLINICAL INTERPRETATION OF PHQ2 SCORE: 3

## 2020-02-07 NOTE — PROGRESS NOTES
"Subjective:     CC: Right-sided abdominal pain.    HPI:   Chelsea presents today with continued right sided, aching, 5/10 abdominal pain, now going on for 7 days. Worsens with eating. Continued nausea, she did vomit 2 days ago. No hematemesis. No diarrhea. Soft bowel movement daily, no bloody or dark stool. No fevers. Has never had this before, but did have brief pain in similar spot after eating big meal a few months ago. No travel.    Has tried bland diet and not improving. Not using zofran because it tastes bad. She does report she is able to drink fluids and eat bland meals.    She is sexually active. No dysuria or urinary frequency, no change in vaginal discharge or vaginal bleeding.    Seen in urgent care 2/3/2020 for right lower quadrant pain with nausea and vomiting.  She had a negative UA, mild leukocytosis at 11.9, and essentially normal CMP, negative pregnancy test.  CT scan of the abdomen was without acute abnormality and did visualize the appendix.      Health Maintenance: Completed    Medications, past medical history, allergies, and social history have been reviewed and updated.    ROS: See HPI      Objective:       Exam:  /80 (BP Location: Right arm, Patient Position: Sitting, BP Cuff Size: Adult)   Pulse 91   Temp 36.8 °C (98.3 °F)   Resp 12   Ht 1.575 m (5' 2\")   Wt 86.2 kg (190 lb)   LMP 01/17/2020 (Exact Date)   SpO2 96%   BMI 34.75 kg/m²  Body mass index is 34.75 kg/m².    Constitutional: Alert. Well appearing. No distress.  Skin: Warm, dry, good turgor, no visible rashes.  Eye: Equal, round and reactive to light, conjunctiva clear, lids normal.  ENMT: Moist mucous membranes. Normal dentition.  Respiratory: Normal effort. Lungs are clear to auscultation bilaterally.  Cardiovascular: Regular rate and rhythm. Normal S1/S2. No murmurs, rubs or gallops.   Abdomen: Soft, nondistended.  Tenderness to right lower quadrant, midline abdomen, and right upper quadrant.  Positive Max " sign.  No rebound or guarding.  No CVA tenderness.  Neuro: Moves all four extremities. No facial droop.  Psych: Answers questions appropriately. Normal affect and mood.    Labs: WBC is 11.9 with left shift.    Assessment & Plan:     25 y.o. female with the following -     1. Right sided abdominal pain  Now with 7 days of continued right-sided, aching, 5 out of 10 abdominal pain that is worse with eating.  Associated nausea with occasional vomiting.  Vitals reassuring.  Exam with right upper and right lower quadrant tenderness and positive Max's sign, no rebound or guarding.    Seen in urgent care 4 days ago for this.  She had a negative UA, mild leukocytosis at 11.9, and essentially normal CMP, negative pregnancy test.  CT scan of the abdomen was without acute abnormality and did visualize the appendix.  Repeat pregnancy test today negative.    Gallbladder disease versus ovarian/uterine source of pain.    Labs as below.  Ordering right upper quadrant ultrasound, if this is unrevealing could consider pelvic ultrasound or HIDA scan. Extensively discussed ER precautions.    - ondansetron (ZOFRAN) 4 MG Tab tablet; Take 1 Tab by mouth every four hours as needed for Nausea/Vomiting for up to 14 days.  Dispense: 30 Tab; Refill: 0  - CBC WITH DIFFERENTIAL; Future  - Comp Metabolic Panel; Future  - LIPASE; Future  - CHLAMYDIA/GC PCR URINE OR SWAB; Future  - US-RUQ; Future  - POCT Pregnancy    Please note that this note was created using voice recognition software.

## 2020-02-11 ENCOUNTER — TELEPHONE (OUTPATIENT)
Dept: MEDICAL GROUP | Facility: LAB | Age: 25
End: 2020-02-11

## 2020-02-11 ENCOUNTER — HOSPITAL ENCOUNTER (OUTPATIENT)
Dept: RADIOLOGY | Facility: MEDICAL CENTER | Age: 25
End: 2020-02-11
Attending: FAMILY MEDICINE
Payer: COMMERCIAL

## 2020-02-11 DIAGNOSIS — R10.9 RIGHT SIDED ABDOMINAL PAIN: ICD-10-CM

## 2020-02-11 PROCEDURE — 76705 ECHO EXAM OF ABDOMEN: CPT

## 2020-02-11 NOTE — TELEPHONE ENCOUNTER
1. Caller Name: Erica                        Call Back Number: [phone]      How would the patient prefer to be contacted with a response: Phone call OK to leave a detailed message    Ultrasound   Pt called asking to be called back for the results to her ultrasound

## 2020-02-13 ENCOUNTER — PATIENT MESSAGE (OUTPATIENT)
Dept: MEDICAL GROUP | Facility: LAB | Age: 25
End: 2020-02-13

## 2020-02-13 DIAGNOSIS — R10.9 RIGHT SIDED ABDOMINAL PAIN: ICD-10-CM

## 2020-02-13 NOTE — TELEPHONE ENCOUNTER
From: Chelsea Carrillo  To: Cornelio Hernandez M.D.  Sent: 2/13/2020 8:41 AM PST  Subject: Non-Urgent Medical Question    Good morning  We previously discussed about the possibility of a referral for a GI dr. I was wondering if you can go ahead and put that request in. I'm still having stomach issues.   Thanks

## 2020-03-05 RX ORDER — EPINEPHRINE 0.3 MG/.3ML
0.3 INJECTION SUBCUTANEOUS ONCE
Qty: 0.3 ML | Refills: 0 | Status: SHIPPED | OUTPATIENT
Start: 2020-03-05 | End: 2021-02-11 | Stop reason: SDUPTHER

## 2020-03-05 NOTE — TELEPHONE ENCOUNTER
Received request via: Patient    Was the patient seen in the last year in this department? No   LOV 02/07/2020  Does the patient have an active prescription (recently filled or refills available) for medication(s) requested? No

## 2020-03-16 PROBLEM — Z87.891 FORMER SMOKER: Status: ACTIVE | Noted: 2020-03-16

## 2020-03-16 PROBLEM — G47.33 OSA (OBSTRUCTIVE SLEEP APNEA): Status: ACTIVE | Noted: 2020-03-16

## 2020-03-16 NOTE — PROGRESS NOTES
"CC: \"Cannot sleep, snoring\"    HPI:  Ms. Erica Carrillo is a 26 y/o  for [employer] kindly referred by Dr. Cornelio Hernandez MD for evaluation of sleep issues.  The patient had a prior sleep study done several years ago at [facility] in [facility].  She does not recall the results.    The patient has had sleep issues \"since I was a kid.\"  He considers her problem to be 7 out of 10 severity because she is \"always tired.\"  She did undergo a tonsillectomy and turbinectomy last year.    She has a regular bed partner.  She goes to bed at 8 PM and gets up at 5 AM.  She works from 7 AM to 6 PM.  She estimates her sleep latency is approximately 30 minutes.  She may look at her phone and social media just prior to turn out the lights and attempting to go to sleep.  She experiences 3-4 nocturnal awakenings and reports one episode of nocturia.    Symptoms include waking up too early in the morning and being unable to return to sleep, napping returning to bed after arising, sleepiness during the day, too little sleep at night, and tiredness during the day.  She is known to snore but evidently not loudly he denies restless legs and leg or arm jerking or twitching during sleep.  She reports sleep talking and disturbing dreams.    Both her mother and father have sleep apnea and use CPAP.    She may fall asleep accidentally when watching TV.  Her total [place] [facility] score was a normal 4 out of 24.    Her  has noticed light snoring, twitching of legs or feet during sleep, sleep talking, and kicking with legs during sleep her  is noticed that she snores when she is really tired and she twitches when she is stressed.    Significant comorbidities and modifying factors include obesity, former smoker, mild intermittent asthma, nut allergy, bronchitis, pneumonia, depression, and up-to-date with influenza vaccination.    Patient Active Problem List    Diagnosis Date Noted   • Obesity 03/18/2020 "   • Former smoker 2020   • DANISHA (obstructive sleep apnea) 2020   • Mild intermittent asthma without complication 2019   • Other insomnia 2019   • Body mass index 34.0-34.9, adult 2019       Past Medical History:   Diagnosis Date   • Anxiety    • Asthma    • Bronchitis    • Depression    • Influenza    • Nasal drainage    • Pneumonia         Past Surgical History:   Procedure Laterality Date   • TONSILLECTOMY         Family History   Problem Relation Age of Onset   • Sleep Apnea Mother    • Sleep Apnea Father        Social History     Socioeconomic History   • Marital status:      Spouse name: Not on file   • Number of children: Not on file   • Years of education: Not on file   • Highest education level: Not on file   Occupational History   • Not on file   Social Needs   • Financial resource strain: Not on file   • Food insecurity     Worry: Not on file     Inability: Not on file   • Transportation needs     Medical: Not on file     Non-medical: Not on file   Tobacco Use   • Smoking status: Former Smoker     Packs/day: 0.00     Years: 5.00     Pack years: 0.00     Last attempt to quit: 2019     Years since quittin.2   • Smokeless tobacco: Never Used   Substance and Sexual Activity   • Alcohol use: Yes     Frequency: 2-4 times a month     Drinks per session: 1 or 2     Binge frequency: Never     Comment: 1x a week   • Drug use: No   • Sexual activity: Not on file   Lifestyle   • Physical activity     Days per week: Not on file     Minutes per session: Not on file   • Stress: Not on file   Relationships   • Social connections     Talks on phone: Not on file     Gets together: Not on file     Attends Spiritism service: Not on file     Active member of club or organization: Not on file     Attends meetings of clubs or organizations: Not on file     Relationship status: Not on file   • Intimate partner violence     Fear of current or ex partner: Not on file     Emotionally  "abused: Not on file     Physically abused: Not on file     Forced sexual activity: Not on file   Other Topics Concern   • Not on file   Social History Narrative    Works in [employer] center       Current Outpatient Medications   Medication Sig Dispense Refill   • zolpidem (AMBIEN) 5 MG Tab Take 1 Tab by mouth at bedtime as needed for Sleep (1 to 2 po qhs prn insomnia/sleep study. Bring to sleep study.) for up to 2 doses. 2 Tab 0   • KURVELO 0.15-30 MG-MCG per tablet Take 1 Tab by mouth every day. 28 Tab 11   • albuterol (PROAIR HFA) 108 (90 Base) MCG/ACT Aero Soln inhalation aerosol Inhale 1-2 Puffs by mouth every 6 hours as needed for Shortness of Breath. 8.5 g 3   • ADVAIR DISKUS 250-50 MCG/DOSE AEROSOL POWDER, BREATH ACTIVATED Inhale 1 Puff by mouth 2 times a day. 1 Inhaler 11   • betamethasone dipropionate (DIPROLENE) 0.05 % Cream BETAMETHASONE DIPROPIONATE 0.05 % CREA       No current facility-administered medications for this visit.     \"CURRENT RX\"    ALLERGIES: Penicillin g    ROS    Constitutional: Denies fever, chills, sweats,  weight loss, fatigue.  Eyes: Denies vision loss, pain, drainage, double vision, wears contact lenses  Ears/Nose/Mouth/Throat: Denies earache, difficulty hearing, rhinitis/nasal congestion, injury, recurrent sore throat, persistent hoarseness, decayed teeth/toothaches, positive for ringing or buzzing in the ears.  Cardiovascular: Denies chest pain, tightness, palpitations, swelling in legs/feet, fainting, difficulty breathing when lying down but gets better when sitting up.   Respiratory: Denies shortness of breath, cough, sputum, wheezing, painful breathing, coughing up blood.   Sleep: per HPI  Gastrointestinal: Denies  difficulty swallowing, nausea, abdominal pain, diarrhea, constipation, positive for heartburn.  Genitourinary: Denies  blood in urine, discharge, frequent urination.   Musculoskeletal: Denies painful joints, sore muscles, back pain.   Integumentary: Denies " "rashes, lumps, color changes.   Neurological: Denies frequent headaches,weakness, dizziness.    PHYSICAL EXAM  Obese    /78 (BP Location: Right arm, Patient Position: Sitting, BP Cuff Size: Large adult)   Pulse (!) 103   Resp 14   Ht 1.575 m (5' 2\")   Wt 87.1 kg (192 lb)   SpO2 96%   BMI 35.12 kg/m²   Appearance: Well-nourished, well-developed, no acute distress  Eyes:  PERRLA, EOMI  ENMT: without lesions, deformities;hearing grossly intact; tongue normal, posterior pharynx without erythema or exudate;   Modified Mallampati (AKA Benavides) Score:  Neck: Supple, trachea midline, no masses  Respiratory effort:  No intercostal retractions or use of accessory muscles  Lung auscultation:  No wheezes rhonchi rubs or rales  Cardiac: No murmurs, rubs, or gallops; regular rhythm, normal rate; no edema  Abdomen:  No tenderness, no organomegaly.  Obese  Musculoskeletal:  Grossly normal; gait and station normal; digits and nails normal  Skin:  No rashes, petechiae, cyanosis  Neurologic: without focal signs; oriented to person, time, place, and purpose; judgement intact  Psychiatric:  No depression, anxiety, agitation        PROBLEMS:  1. DANISHA (obstructive sleep apnea)    - zolpidem (AMBIEN) 5 MG Tab; Take 1 Tab by mouth at bedtime as needed for Sleep (1 to 2 po qhs prn insomnia/sleep study. Bring to sleep study.) for up to 2 doses.  Dispense: 2 Tab; Refill: 0  - Polysomnography, 4 or More; Future    2. Other insomnia      3. Mild intermittent asthma without complication      4. Former smoker      5. Class 2 obesity with body mass index (BMI) of 35.0 to 35.9 in adult, unspecified obesity type, unspecified whether serious comorbidity present    - OBESITY COUNSELING (No Charge): Patient identified as having weight management issue.  Appropriate orders and counseling given.      PLAN:   The patient has signs and symptoms consistent with obstructive sleep apnea hypopnea syndrome. Will schedule to have a nocturnal " polysomnogram using zolpidem to assist with sleep onset and maintenance should the need arise. Will return after the results are available to determine further diagnostic needs and/or treatment options.    The risks of untreated sleep apnea were discussed with the patient at length. Patients with DANISHA are at increased risk of cardiovascular disease including coronary artery disease, systemic arterial hypertension, pulmonary arterial hypertension, cardiac arrythmias, and stroke. DANISHA patients have an increased risk of motor vehicle accidents, type 2 diabetes, chronic kidney disease, and non-alcoholic liver disease. The patient was advised to avoid driving a motor vehicle when drowsy.    Positive airway pressure, such as CPAP, is considered first-line and preferred therapy for sleep apnea and may reverse both symptoms and risks.    Insomnia symptoms persist after any and all sleep apnea has been adequately addressed then referral to Dr. Strauss for cognitive behavioral therapy for insomnia would be in order.    Have advised the patient to follow up with the appropriate healthcare practitioners for all other medical problems and issues.      Return for after sleep study.

## 2020-03-18 ENCOUNTER — SLEEP CENTER VISIT (OUTPATIENT)
Dept: SLEEP MEDICINE | Facility: MEDICAL CENTER | Age: 25
End: 2020-03-18
Payer: COMMERCIAL

## 2020-03-18 VITALS
HEIGHT: 62 IN | WEIGHT: 192 LBS | BODY MASS INDEX: 35.33 KG/M2 | HEART RATE: 103 BPM | DIASTOLIC BLOOD PRESSURE: 78 MMHG | RESPIRATION RATE: 14 BRPM | OXYGEN SATURATION: 96 % | SYSTOLIC BLOOD PRESSURE: 106 MMHG

## 2020-03-18 DIAGNOSIS — E66.9 CLASS 2 OBESITY WITH BODY MASS INDEX (BMI) OF 35.0 TO 35.9 IN ADULT, UNSPECIFIED OBESITY TYPE, UNSPECIFIED WHETHER SERIOUS COMORBIDITY PRESENT: ICD-10-CM

## 2020-03-18 DIAGNOSIS — G47.09 OTHER INSOMNIA: ICD-10-CM

## 2020-03-18 DIAGNOSIS — J45.20 MILD INTERMITTENT ASTHMA WITHOUT COMPLICATION: ICD-10-CM

## 2020-03-18 DIAGNOSIS — Z87.891 FORMER SMOKER: ICD-10-CM

## 2020-03-18 DIAGNOSIS — G47.33 OSA (OBSTRUCTIVE SLEEP APNEA): ICD-10-CM

## 2020-03-18 PROCEDURE — 99244 OFF/OP CNSLTJ NEW/EST MOD 40: CPT | Performed by: INTERNAL MEDICINE

## 2020-03-18 RX ORDER — ZOLPIDEM TARTRATE 5 MG/1
5 TABLET ORAL NIGHTLY PRN
Qty: 2 TAB | Refills: 0 | Status: SHIPPED | OUTPATIENT
Start: 2020-03-18 | End: 2020-04-18

## 2020-03-18 ASSESSMENT — FIBROSIS 4 INDEX: FIB4 SCORE: 0.24

## 2020-03-20 ENCOUNTER — SLEEP STUDY (OUTPATIENT)
Dept: SLEEP MEDICINE | Facility: MEDICAL CENTER | Age: 25
End: 2020-03-20
Attending: INTERNAL MEDICINE
Payer: COMMERCIAL

## 2020-03-20 DIAGNOSIS — G47.33 OSA (OBSTRUCTIVE SLEEP APNEA): ICD-10-CM

## 2020-03-20 PROCEDURE — 95810 POLYSOM 6/> YRS 4/> PARAM: CPT | Performed by: FAMILY MEDICINE

## 2020-03-22 NOTE — PROCEDURES
Technical summary:The patient underwent a diagnostic polysomnogram. This was a 16 channel montage study to include a 6 channel EEG, a 2 channel EOG, and chin EMG, left and right leg EMG, a snore channel, a nasal pressure transducer, and a nasal oral airflow thermistor.   Respiratory effort was assessed with the use of a thoracic and abdominal monitor and overnight oximetry was obtained. Audio and video recordings were reviewed. This was a fully attended study and sleep stage scoring was performed. The test was technically adequate.       Scoring Criteria: A modification of the the AASM Manual for the Scoring of Sleep and Associated Events. Obstructive apnea was scored by cessation of airflow for at least 10 seconds with continuing respiratory effort.  Central apnea was scored by cessation of airflow for at least 10 seconds with no effort.  Hypopnea was scored by a 30% or more reduction in airflow for at least 10 seconds accompanied by an arterial oxygen desaturation of 3% or more.  (For Medicare patients, hypopneas were scored by a 30% or more reduction in airflow for at least 10 seconds accompanied by an arterial oxygen saturation of 4% or more, as required by their insurance, CMS.      Interpretation:  Study start time was 10:28:13 PM.  Diagnostic recording time was 6h 3.0m with a total sleep time of 4h 52.5m resulting in a sleep efficiency of 80.58%%.  Sleep latency from the start of the study was 10 minutes and the latency from sleep to REM was 137 minutes.In total,98  arousals were scored for an arousal index of 20.1. sleep stages showed decreased SE, increased WASO of 49 min, normal N3 and decreased REM sleep.    Respiratory:  There were a total of 0 apneas consisting of 0 obstructive apneas, 0 mixed apneas, and 0 central apneas.  A total of 103 hypopneas were scored.The apnea index was 0.00 per hour and the hypopnea index was 21.13 per hour resulting in an overall AHI of 21.13.AHI during rem was 10.5 and AHI  while supine was 19.27.    Oximetry:  There was a mean oxygen saturation of 93.0% with a minimum oxygen saturation of 87.0%.  Time spent with oxygen saturations below 89% was 0.3 minutes.    Cardiac:  The highest heart rate seen while awake was 111 BPM while the highest heart rate during sleep was 109 BPM with an average sleeping heart rate of 85 BPM.    Limb Movements:  There were a total of 13 PLMs during sleep, of which 1 were PLMS arousals.  This resulted in a PLMS index of 2.7 and a PLMS arousal index of 0.2.    Impression:  1.  Moderate OAS with AHI of 21.1/hr and O2 tyson 90 %      Recommendations:  Recommend the patient return for a CPAP titration. In some cases alternative treatment options may prove effective in resolving sleep apnea and these options include upper airway surgery, the use of a dental orthotic or weight loss and positional therapy. Clinical correlation is required. In general patients with sleep apnea are advised to avoid alcohol and sedatives and to not operate a motor vehicle while drowsy and are at a greater risk for cardiovascular disease.'

## 2020-04-22 ENCOUNTER — TELEMEDICINE (OUTPATIENT)
Dept: SLEEP MEDICINE | Facility: MEDICAL CENTER | Age: 25
End: 2020-04-22
Payer: COMMERCIAL

## 2020-04-22 DIAGNOSIS — G47.33 OSA (OBSTRUCTIVE SLEEP APNEA): ICD-10-CM

## 2020-04-22 PROCEDURE — 99213 OFFICE O/P EST LOW 20 MIN: CPT | Mod: 95,CR | Performed by: INTERNAL MEDICINE

## 2020-04-22 RX ORDER — OMEPRAZOLE 20 MG/1
40 CAPSULE, DELAYED RELEASE ORAL 2 TIMES DAILY
COMMUNITY
End: 2021-10-19

## 2020-04-22 NOTE — PROGRESS NOTES
Telemedicine Visit: Established Patient     This encounter was conducted via [employer].  Verbal consent was obtained. Patient's identity was verified.    Subjective:   CC: sleep study results    Chelsea Carrillo is a 25 y.o. female presenting for evaluation and management of DANISHA.  She has a drawl family history of DANISHA and herself has suffered from nonrestorative sleep and daytime hypersomnolence.  Polysomnography in the sleep laboratory confirmed moderate DANISHA with AHI 21/h.  There were no significant associated oxygen desaturations.  CPAP titration was not performed due to COVID-19 precautions.  We discussed treatment options including AutoPap, a dental appliance, Inspire, and she was interested in a trial on CPAP therapy.        ROS   Denies any recent fevers or chills. No nausea or vomiting. No chest pains or shortness of breath.     Allergies   Allergen Reactions   • Penicillin G      Other reaction(s): unknown- reaction happened when she was a little girl       Current medicines (including changes today)  Current Outpatient Medications   Medication Sig Dispense Refill   • omeprazole (PRILOSEC) 20 MG delayed-release capsule Take 40 mg by mouth 2 times a day.     • KURVELO 0.15-30 MG-MCG per tablet Take 1 Tab by mouth every day. 28 Tab 11   • albuterol (PROAIR HFA) 108 (90 Base) MCG/ACT Aero Soln inhalation aerosol Inhale 1-2 Puffs by mouth every 6 hours as needed for Shortness of Breath. 8.5 g 3   • ADVAIR DISKUS 250-50 MCG/DOSE AEROSOL POWDER, BREATH ACTIVATED Inhale 1 Puff by mouth 2 times a day. 1 Inhaler 11   • betamethasone dipropionate (DIPROLENE) 0.05 % Cream BETAMETHASONE DIPROPIONATE 0.05 % CREA       No current facility-administered medications for this visit.        Patient Active Problem List    Diagnosis Date Noted   • Obesity 03/18/2020   • Former smoker 03/16/2020   • DANISHA (obstructive sleep apnea) 03/16/2020   • Mild intermittent asthma without complication 11/22/2019   • Other insomnia  11/22/2019   • Body mass index 34.0-34.9, adult 09/20/2019       Family History   Problem Relation Age of Onset   • Sleep Apnea Mother    • Sleep Apnea Father        She  has a past medical history of Anxiety (2015), Asthma, Bronchitis, Depression (2015), Influenza, Nasal drainage, and Pneumonia.  She  has a past surgical history that includes tonsillectomy.       Objective:   Vitals obtained by patient:      Physical Exam:  Constitutional: Alert, no distress, well-groomed.  Skin: No rashes in visible areas.  Eye: Round. Conjunctiva clear, lids normal. No icterus.   ENMT: Lips pink without lesions, good dentition, moist mucous membranes. Phonation normal.  Neck: No masses, no thyromegaly. Moves freely without pain.  CV: Pulse as reported by patient  Respiratory: Unlabored respiratory effort, no cough or audible wheeze  Psych: Alert and oriented x3, normal affect and mood.       Assessment and Plan:   The following treatment plan was discussed:     1. DANISHA (obstructive sleep apnea)  - DME CPAP    Other orders  - omeprazole (PRILOSEC) 20 MG delayed-release capsule; Take 40 mg by mouth 2 times a day.    The patient has moderately severe DANISHA, AHI 21/h associated with nonrestorative sleep and daytime hypersomnolence, warranting treatment.  She will start AutoPap: 5-15 cm of water using a nasal CPAP mask; we will download compliance card after 8 weeks on AutoPap therapy to monitor response to treatment.    Follow-up: Return in about 3 months (around 7/22/2020).

## 2020-07-10 NOTE — TELEPHONE ENCOUNTER
Received request via: Pharmacy    Was the patient seen in the last year in this department? Yes    Does the patient have an active prescription (recently filled or refills available) for medication(s) requested? No     PROAIR HFA 90 MCG INHALER

## 2020-08-20 ENCOUNTER — HOSPITAL ENCOUNTER (OUTPATIENT)
Dept: LAB | Facility: MEDICAL CENTER | Age: 25
End: 2020-08-20
Payer: COMMERCIAL

## 2020-08-20 LAB
BDY FAT % MEASURED: 37 %
BP DIAS: 78 MMHG
BP SYS: 120 MMHG
CHOLEST SERPL-MCNC: 194 MG/DL (ref 100–199)
DIABETES HTDIA: NO
EVENT NAME HTEVT: NORMAL
FASTING HTFAS: YES
GLUCOSE SERPL-MCNC: 78 MG/DL (ref 65–99)
HDLC SERPL-MCNC: 35 MG/DL
HYPERTENSION HTHYP: NO
LDLC SERPL CALC-MCNC: 132 MG/DL
SCREENING LOC CITY HTCIT: NORMAL
SCREENING LOC STATE HTSTA: NORMAL
SCREENING LOCATION HTLOC: NORMAL
SMOKING HTSMO: YES
SUBSCRIBER ID HTSID: NORMAL
TRIGL SERPL-MCNC: 136 MG/DL (ref 0–149)

## 2020-08-20 PROCEDURE — 82947 ASSAY GLUCOSE BLOOD QUANT: CPT

## 2020-08-20 PROCEDURE — 36415 COLL VENOUS BLD VENIPUNCTURE: CPT

## 2020-08-20 PROCEDURE — S5190 WELLNESS ASSESSMENT BY NONPH: HCPCS

## 2020-08-20 PROCEDURE — 80061 LIPID PANEL: CPT

## 2020-08-24 ENCOUNTER — OFFICE VISIT (OUTPATIENT)
Dept: SLEEP MEDICINE | Facility: MEDICAL CENTER | Age: 25
End: 2020-08-24
Payer: COMMERCIAL

## 2020-08-24 VITALS
HEIGHT: 62 IN | WEIGHT: 189 LBS | OXYGEN SATURATION: 94 % | DIASTOLIC BLOOD PRESSURE: 70 MMHG | SYSTOLIC BLOOD PRESSURE: 112 MMHG | HEART RATE: 87 BPM | RESPIRATION RATE: 16 BRPM | BODY MASS INDEX: 34.78 KG/M2

## 2020-08-24 DIAGNOSIS — G47.33 OSA (OBSTRUCTIVE SLEEP APNEA): ICD-10-CM

## 2020-08-24 DIAGNOSIS — G47.09 OTHER INSOMNIA: ICD-10-CM

## 2020-08-24 DIAGNOSIS — Z63.31: ICD-10-CM

## 2020-08-24 DIAGNOSIS — F41.9 ANXIETY: ICD-10-CM

## 2020-08-24 DIAGNOSIS — F43.9 STRESS: ICD-10-CM

## 2020-08-24 DIAGNOSIS — Z87.891 FORMER SMOKER: ICD-10-CM

## 2020-08-24 PROCEDURE — 99214 OFFICE O/P EST MOD 30 MIN: CPT | Performed by: NURSE PRACTITIONER

## 2020-08-24 ASSESSMENT — FIBROSIS 4 INDEX: FIB4 SCORE: 0.24

## 2020-08-24 NOTE — PROGRESS NOTES
Chief Complaint   Patient presents with   • Apnea     Last Seen 4/22/2020        HPI:  Chelsea Carrillo is a 25 y.o. year old female here today for follow-up on DANISHA.  Last OV 4/22/20 with Dr. Miller.    Polysomnography in the sleep laboratory confirmed moderate DANISHA with AHI 21/h.  There were no significant associated oxygen desaturations.  CPAP titration was not performed due to COVID-19 precautions. She was started on APAP 5-15cm H20 nightly.  Compliance report 7/26/2023 2420 indicates 97% compliance, average nightly use 4 hours 21 minutes, minimal mask leak with reduced AHI of 1.6/h.  Patient tolerates mask and pressure well but notes with a chronic history of insomnia she has nights where she wakes up and cannot resume sleep.  She is also under immense extreme stress with impending deployment of her .  She understands the benefit of therapy and does feel she has a better quality of sleep but she is able to sleep longer the device continue with therapy.  She would also like a referral to our sleep psychologist Dr. pike for help with her chronic insomnia and also psychiatry for the stress of her  being deployed to help her manage this process better.  She has a mild history of asthma well-controlled on inhaler therapy.  She denies cardiac symptoms.      ROS: As per HPI and otherwise negative if not stated.    Past Medical History:   Diagnosis Date   • Anxiety 2015   • Asthma    • Bronchitis    • Depression 2015   • Influenza    • Nasal drainage    • Pneumonia        Past Surgical History:   Procedure Laterality Date   • TONSILLECTOMY         Family History   Problem Relation Age of Onset   • Sleep Apnea Mother    • Sleep Apnea Father        Social History     Socioeconomic History   • Marital status:      Spouse name: Not on file   • Number of children: Not on file   • Years of education: Not on file   • Highest education level: Not on file   Occupational History   • Not on file   Social Needs  "  • Financial resource strain: Not on file   • Food insecurity     Worry: Not on file     Inability: Not on file   • Transportation needs     Medical: Not on file     Non-medical: Not on file   Tobacco Use   • Smoking status: Former Smoker     Packs/day: 0.00     Years: 5.00     Pack years: 0.00     Quit date: 2019     Years since quittin.6   • Smokeless tobacco: Never Used   Substance and Sexual Activity   • Alcohol use: Yes     Frequency: 2-4 times a month     Drinks per session: 1 or 2     Binge frequency: Never     Comment: 1x a week   • Drug use: No   • Sexual activity: Not on file   Lifestyle   • Physical activity     Days per week: Not on file     Minutes per session: Not on file   • Stress: Not on file   Relationships   • Social connections     Talks on phone: Not on file     Gets together: Not on file     Attends Buddhism service: Not on file     Active member of club or organization: Not on file     Attends meetings of clubs or organizations: Not on file     Relationship status: Not on file   • Intimate partner violence     Fear of current or ex partner: Not on file     Emotionally abused: Not on file     Physically abused: Not on file     Forced sexual activity: Not on file   Other Topics Concern   • Not on file   Social History Narrative    Works in [employer]       Allergies as of 2020 - Reviewed 2020   Allergen Reaction Noted   • Penicillin g          Vitals:  /70 (BP Location: Left arm, Patient Position: Sitting, BP Cuff Size: Adult)   Pulse 87   Resp 16   Ht 1.575 m (5' 2\")   Wt 85.7 kg (189 lb)   SpO2 94%     Current medications as of today   Current Outpatient Medications   Medication Sig Dispense Refill   • PROAIR  (90 Base) MCG/ACT Aero Soln inhalation aerosol INHALE ONE TO TWO PUFFS BY MOUTH EVERY 6 HOURS AS NEEDED FOR SHORTNESS OF BREATH (PROAIR HFA) 8.5 g 2   • omeprazole (PRILOSEC) 20 MG delayed-release capsule Take 40 mg by mouth 2 times a " day.     • KURVELO 0.15-30 MG-MCG per tablet Take 1 Tab by mouth every day. 28 Tab 11   • ADVAIR DISKUS 250-50 MCG/DOSE AEROSOL POWDER, BREATH ACTIVATED Inhale 1 Puff by mouth 2 times a day. 1 Inhaler 11   • betamethasone dipropionate (DIPROLENE) 0.05 % Cream BETAMETHASONE DIPROPIONATE 0.05 % CREA       No current facility-administered medications for this visit.          Physical Exam:   Gen:           Alert and oriented, No apparent distress. Mood and affect appropriate, normal interaction with examiner.  Eyes:          PERRL, EOM intact, sclere white, conjunctive moist.  Ears:          Not examined.   Hearing:     Grossly intact.  Nose:          Normal, no lesions or deformities.  Dentition:    Good dentition.  Oropharynx:   mask  Mallampati Classification: mask  Neck:        Supple, trachea midline, no masses.  Respiratory Effort: No intercostal retractions or use of accessory muscles.   Lung Auscultation:      Clear to auscultation bilaterally; no rales, rhonchi or wheezing.  CV:            Regular rate and rhythm. No murmurs, rubs or gallops.  Abd:           Not examined.   Lymphadenopathy: Not examined.  Gait and Station: Normal.  Digits and Nails: No clubbing, cyanosis, petechiae, or nodes.   Cranial Nerves: II-XII grossly intact.  Skin:        No rashes, lesions or ulcers noted.               Ext:           No cyanosis or edema.      Assessment:  1. DANISHA (obstructive sleep apnea)     2. Other insomnia     3. Body mass index 34.0-34.9, adult  Height And Weight   4. Former smoker         Immunizations:    Flu:9/2019  Pneumovax 23:11/2019  Prevnar 13:not due    Plan:  1.  Patient sleep apnea is improving on therapy and she sees the quality of her sleep improving.  She will continue to benefit from auto CPAP nightly.  We reviewed the benefit of therapy.  She is very to continue with it.  DME mask/supplies.  DME other; SIM card for CPAP.  2.  Discussed sleep hygiene.  Handout provided to patient on sleep  hygiene.  Referral to Dr. pike for chronic insomnia.  3.  Referral to psychiatry for impending deployment of  in the  with related stress/anxiety.  4.  Encouraged weight loss through diet and exercise.  5.  Follow-up in 6 weeks for compliance check, sooner if needed.    Please note that this dictation was created using voice recognition software. I have made every reasonable attempt to correct obvious errors, but it is possible there are errors of grammar and possibly content that I did not discover before finalizing the note.

## 2020-09-15 ENCOUNTER — PATIENT MESSAGE (OUTPATIENT)
Dept: MEDICAL GROUP | Facility: LAB | Age: 25
End: 2020-09-15

## 2020-09-15 DIAGNOSIS — Z11.59 SCREENING FOR VIRAL DISEASE: ICD-10-CM

## 2020-09-16 ENCOUNTER — HOSPITAL ENCOUNTER (OUTPATIENT)
Dept: LAB | Facility: MEDICAL CENTER | Age: 25
End: 2020-09-16
Attending: FAMILY MEDICINE
Payer: COMMERCIAL

## 2020-09-16 LAB
COVID ORDER STATUS COVID19: NORMAL
SARS-COV-2 RNA RESP QL NAA+PROBE: NOTDETECTED
SPECIMEN SOURCE: NORMAL

## 2020-09-16 PROCEDURE — U0003 INFECTIOUS AGENT DETECTION BY NUCLEIC ACID (DNA OR RNA); SEVERE ACUTE RESPIRATORY SYNDROME CORONAVIRUS 2 (SARS-COV-2) (CORONAVIRUS DISEASE [COVID-19]), AMPLIFIED PROBE TECHNIQUE, MAKING USE OF HIGH THROUGHPUT TECHNOLOGIES AS DESCRIBED BY CMS-2020-01-R: HCPCS

## 2020-09-16 PROCEDURE — C9803 HOPD COVID-19 SPEC COLLECT: HCPCS

## 2020-09-26 ENCOUNTER — IMMUNIZATION (OUTPATIENT)
Dept: SOCIAL WORK | Facility: CLINIC | Age: 25
End: 2020-09-26

## 2020-09-26 DIAGNOSIS — Z23 NEED FOR VACCINATION: ICD-10-CM

## 2020-09-26 PROCEDURE — 90686 IIV4 VACC NO PRSV 0.5 ML IM: CPT | Performed by: REGISTERED NURSE

## 2020-11-04 ENCOUNTER — TELEMEDICINE (OUTPATIENT)
Dept: SLEEP MEDICINE | Facility: MEDICAL CENTER | Age: 25
End: 2020-11-04
Payer: COMMERCIAL

## 2020-11-04 VITALS — BODY MASS INDEX: 34.57 KG/M2 | HEIGHT: 62 IN

## 2020-11-04 DIAGNOSIS — Z63.31: ICD-10-CM

## 2020-11-04 DIAGNOSIS — G47.09 OTHER INSOMNIA: ICD-10-CM

## 2020-11-04 DIAGNOSIS — F43.9 STRESS: ICD-10-CM

## 2020-11-04 DIAGNOSIS — G47.33 OSA (OBSTRUCTIVE SLEEP APNEA): ICD-10-CM

## 2020-11-04 DIAGNOSIS — F41.9 ANXIETY: ICD-10-CM

## 2020-11-04 PROCEDURE — 99214 OFFICE O/P EST MOD 30 MIN: CPT | Mod: 95,CR | Performed by: NURSE PRACTITIONER

## 2020-11-04 NOTE — PATIENT INSTRUCTIONS
Insomnia  Insomnia is a sleep disorder that makes it difficult to fall asleep or stay asleep. Insomnia can cause fatigue, low energy, difficulty concentrating, mood swings, and poor performance at work or school.  There are three different ways to classify insomnia:  · Difficulty falling asleep.  · Difficulty staying asleep.  · Waking up too early in the morning.  Any type of insomnia can be long-term (chronic) or short-term (acute). Both are common. Short-term insomnia usually lasts for three months or less. Chronic insomnia occurs at least three times a week for longer than three months.  What are the causes?  Insomnia may be caused by another condition, situation, or substance, such as:  · Anxiety.  · Certain medicines.  · Gastroesophageal reflux disease (GERD) or other gastrointestinal conditions.  · Asthma or other breathing conditions.  · Restless legs syndrome, sleep apnea, or other sleep disorders.  · Chronic pain.  · Menopause.  · Stroke.  · Abuse of alcohol, tobacco, or illegal drugs.  · Mental health conditions, such as depression.  · Caffeine.  · Neurological disorders, such as Alzheimer's disease.  · An overactive thyroid (hyperthyroidism).  Sometimes, the cause of insomnia may not be known.  What increases the risk?  Risk factors for insomnia include:  · Gender. Women are affected more often than men.  · Age. Insomnia is more common as you get older.  · Stress.  · Lack of exercise.  · Irregular work schedule or working night shifts.  · Traveling between different time zones.  · Certain medical and mental health conditions.  What are the signs or symptoms?  If you have insomnia, the main symptom is having trouble falling asleep or having trouble staying asleep. This may lead to other symptoms, such as:  · Feeling fatigued or having low energy.  · Feeling nervous about going to sleep.  · Not feeling rested in the morning.  · Having trouble concentrating.  · Feeling irritable, anxious, or depressed.  How  is this diagnosed?  This condition may be diagnosed based on:  · Your symptoms and medical history. Your health care provider may ask about:  ? Your sleep habits.  ? Any medical conditions you have.  ? Your mental health.  · A physical exam.  How is this treated?  Treatment for insomnia depends on the cause. Treatment may focus on treating an underlying condition that is causing insomnia. Treatment may also include:  · Medicines to help you sleep.  · Counseling or therapy.  · Lifestyle adjustments to help you sleep better.  Follow these instructions at home:  Eating and drinking    · Limit or avoid alcohol, caffeinated beverages, and cigarettes, especially close to bedtime. These can disrupt your sleep.  · Do not eat a large meal or eat spicy foods right before bedtime. This can lead to digestive discomfort that can make it hard for you to sleep.  Sleep habits    · Keep a sleep diary to help you and your health care provider figure out what could be causing your insomnia. Write down:  ? When you sleep.  ? When you wake up during the night.  ? How well you sleep.  ? How rested you feel the next day.  ? Any side effects of medicines you are taking.  ? What you eat and drink.  · Make your bedroom a dark, comfortable place where it is easy to fall asleep.  ? Put up shades or blackout curtains to block light from outside.  ? Use a white noise machine to block noise.  ? Keep the temperature cool.  · Limit screen use before bedtime. This includes:  ? Watching TV.  ? Using your smartphone, tablet, or computer.  · Stick to a routine that includes going to bed and waking up at the same times every day and night. This can help you fall asleep faster. Consider making a quiet activity, such as reading, part of your nighttime routine.  · Try to avoid taking naps during the day so that you sleep better at night.  · Get out of bed if you are still awake after 15 minutes of trying to sleep. Keep the lights down, but try reading or  doing a quiet activity. When you feel sleepy, go back to bed.  General instructions  · Take over-the-counter and prescription medicines only as told by your health care provider.  · Exercise regularly, as told by your health care provider. Avoid exercise starting several hours before bedtime.  · Use relaxation techniques to manage stress. Ask your health care provider to suggest some techniques that may work well for you. These may include:  ? Breathing exercises.  ? Routines to release muscle tension.  ? Visualizing peaceful scenes.  · Make sure that you drive carefully. Avoid driving if you feel very sleepy.  · Keep all follow-up visits as told by your health care provider. This is important.  Contact a health care provider if:  · You are tired throughout the day.  · You have trouble in your daily routine due to sleepiness.  · You continue to have sleep problems, or your sleep problems get worse.  Get help right away if:  · You have serious thoughts about hurting yourself or someone else.  If you ever feel like you may hurt yourself or others, or have thoughts about taking your own life, get help right away. You can go to your nearest emergency department or call:  · Your local emergency services (911 in the [place]  · A suicide crisis helpline, such as the National Suicide Prevention Lifeline at 1-484.536.9621. This is open 24 hours a day.  Summary  · Insomnia is a sleep disorder that makes it difficult to fall asleep or stay asleep.  · Insomnia can be long-term (chronic) or short-term (acute).  · Treatment for insomnia depends on the cause. Treatment may focus on treating an underlying condition that is causing insomnia.  · Keep a sleep diary to help you and your health care provider figure out what could be causing your insomnia.  This information is not intended to replace advice given to you by your health care provider. Make sure you discuss any questions you have with your health care provider.  Document  Released: 12/15/2001 Document Revised: 11/30/2018 Document Reviewed: 09/27/2018  Elsevier Patient Education © 2020 [employer]

## 2020-11-04 NOTE — PROGRESS NOTES
"Virtual Visit: Established Patient   This visit was conducted via Zoom using secure and encrypted videoconferencing technology. The patient was in a private location in the state of [place].    The patient's identity was confirmed and verbal consent was obtained for this virtual visit.  Given the importance of social distancing and other strategies recommended to reduce the risk of COVID-19 transmission, I am providing medical care to this patient via audio/video visit in place of an in person visit at the request of the patient.  Subjective:   CC:   Chief Complaint   Patient presents with   • Follow-Up     DANISHA-Last seen 08/24/2020       Chelsea Carrillo is a 25 y.o. female presenting for evaluation and management of DANISHA. PMH includes asthma, chronic insomnia, former smoker, obesity.    PSG from 3/20/20 confirmed moderate DANISHA with AHI 21/h and O2 tyson 90 %.  There were no significant associated oxygen desaturations.  CPAP titration was not performed due to COVID-19 precautions. She was started on autoCPAP 5-15 cmH2O.     Compliance report from 10/5/20-11/3/20 was downloaded and reviewed with the patient which showed autoCPAP 5-15 cmH2O, 97% compliance, 6 hrs 18 min use, AHI of 1.8. She is tolerating the pressure and mask well. She goes to bed at 9 pm and wakes up at 5 am.  She typically has trouble staying asleep.  She denies morning headache or snoring. She has noticed feeling better since she has been using the CPAP.   Due to \"a lot going on in her personal life\" she has not called Dr. Strauss's office back to schedule an apt. She did however follow-up with psychiatry and saw Dr. Blackwood. She will try to be more active by walking after work or on the weekends. She denies any new health problems.     ROS   Denies any recent fevers or chills. No nausea or vomiting. No chest pains or shortness of breath.     Allergies   Allergen Reactions   • Penicillin G      Other reaction(s): unknown- reaction happened when she was " "a little girl       Current medicines (including changes today)  Current Outpatient Medications   Medication Sig Dispense Refill   • sertraline (ZOLOFT) 50 MG Tab      • PROAIR  (90 Base) MCG/ACT Aero Soln inhalation aerosol INHALE ONE TO TWO PUFFS BY MOUTH EVERY 6 HOURS AS NEEDED FOR SHORTNESS OF BREATH (PROAIR HFA) 8.5 g 2   • ADVAIR DISKUS 250-50 MCG/DOSE AEROSOL POWDER, BREATH ACTIVATED Inhale 1 Puff by mouth 2 times a day. 1 Inhaler 11   • omeprazole (PRILOSEC) 20 MG delayed-release capsule Take 40 mg by mouth 2 times a day.     • KURVELO 0.15-30 MG-MCG per tablet Take 1 Tab by mouth every day. (Patient not taking: Reported on 11/4/2020) 28 Tab 11   • betamethasone dipropionate (DIPROLENE) 0.05 % Cream BETAMETHASONE DIPROPIONATE 0.05 % CREA       No current facility-administered medications for this visit.        Patient Active Problem List    Diagnosis Date Noted   • Obesity 03/18/2020   • Former smoker 03/16/2020   • DANISHA (obstructive sleep apnea) 03/16/2020   • Mild intermittent asthma without complication 11/22/2019   • Other insomnia 11/22/2019   • Body mass index 34.0-34.9, adult 09/20/2019       Family History   Problem Relation Age of Onset   • Sleep Apnea Mother    • Sleep Apnea Father        She  has a past medical history of Anxiety (2015), Asthma, Bronchitis, Depression (2015), Influenza, Nasal drainage, and Pneumonia.  She  has a past surgical history that includes tonsillectomy.       Objective:   Ht 1.575 m (5' 2\")   BMI 34.57 kg/m²     Physical Exam:  Constitutional: Alert, no distress, well-groomed.  Skin: No rashes in visible areas.  Eye: Round. Conjunctiva clear, lids normal. No icterus.   ENMT: Lips pink without lesions, good dentition, moist mucous membranes. Phonation normal.  Neck: Moves freely without pain.  Respiratory: Unlabored respiratory effort, no cough or audible wheeze  Psych: Alert and oriented x3, normal affect and mood.       Assessment and Plan:   The following " treatment plan was discussed:     1. DANISHA (obstructive sleep apnea)  - DME Mask and Supplies    2. Other insomnia    3. BMI 34.0-34.9,adult    4. Stress    5. Anxiety    6. Absence of family member due to  deployment    Other orders  - sertraline (ZOLOFT) 50 MG Tab      Discussed the cardiovascular and neuropsychiatric risks of untreated DANISHA; including but not limited to: HTN, DM, MI, ASCVD, CVA, CHF, traffic accidents.     1. Compliance report from 10/5/20-11/3/20 was downloaded and reviewed with the patient which showed autoCPAP 5-15 cmH2O, 97% compliance, 6 hrs 18 min use, AHI of 1.8. Continue autoCPAP. Patient is compliant and benefiting from autoCPAP therapy for management of DANISHA.   2. DME order ([facility]) for mask (nasal mask or MOC) and supplies was provided today. Continue to clean mask and supplies weekly, and change supplies per insurance guidelines.   3.  Encouraged healthy diet and exercise to help with weight loss and management  Weight gain can make apnea worse.  4. Follow up with the appropriate healthcare practitioners for all other medical problems and issues.  5. Sleep hygiene discussed.  Encouraged patient to follow-up with Dr. Strauss for CBT-I. Educational information regarding insomnia was provided to the patient for further review.   6. Continue to f/u with Dr. Blackwood, psychiatry, for stress/anxiety due to family member  deployment.       Follow-up: Return in about 1 year (around 11/4/2021).or sooner if needed    EMELI Millard.    This dictation was created using voice recognition software. The accuracy of the dictation is limited to the abilities of the software. I expect there may be some errors of grammar and possibly content.

## 2020-12-08 ENCOUNTER — EH NON-PROVIDER (OUTPATIENT)
Dept: OCCUPATIONAL MEDICINE | Facility: CLINIC | Age: 25
End: 2020-12-08
Payer: COMMERCIAL

## 2020-12-08 DIAGNOSIS — Z02.89 ENCOUNTER FOR OCCUPATIONAL HEALTH EXAMINATION INVOLVING RESPIRATOR: ICD-10-CM

## 2020-12-08 PROCEDURE — 94375 RESPIRATORY FLOW VOLUME LOOP: CPT | Performed by: PREVENTIVE MEDICINE

## 2020-12-08 PROCEDURE — 94010 BREATHING CAPACITY TEST: CPT | Performed by: PREVENTIVE MEDICINE

## 2020-12-10 ENCOUNTER — APPOINTMENT (OUTPATIENT)
Dept: RADIOLOGY | Facility: MEDICAL CENTER | Age: 25
End: 2020-12-10
Attending: EMERGENCY MEDICINE
Payer: COMMERCIAL

## 2020-12-10 ENCOUNTER — HOSPITAL ENCOUNTER (EMERGENCY)
Facility: MEDICAL CENTER | Age: 25
End: 2020-12-10
Attending: EMERGENCY MEDICINE
Payer: COMMERCIAL

## 2020-12-10 VITALS
SYSTOLIC BLOOD PRESSURE: 127 MMHG | BODY MASS INDEX: 35.51 KG/M2 | HEIGHT: 62 IN | HEART RATE: 109 BPM | RESPIRATION RATE: 29 BRPM | OXYGEN SATURATION: 96 % | DIASTOLIC BLOOD PRESSURE: 77 MMHG | TEMPERATURE: 97.4 F | WEIGHT: 193 LBS
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
## 2020-12-10 DIAGNOSIS — S16.1XXA STRAIN OF NECK MUSCLE, INITIAL ENCOUNTER: ICD-10-CM

## 2020-12-10 DIAGNOSIS — S30.1XXA CONTUSION OF ABDOMINAL WALL, INITIAL ENCOUNTER: ICD-10-CM

## 2020-12-10 DIAGNOSIS — S49.92XA INJURY OF LEFT SHOULDER, INITIAL ENCOUNTER: ICD-10-CM

## 2020-12-10 DIAGNOSIS — S20.212A CONTUSION OF LEFT CHEST WALL, INITIAL ENCOUNTER: ICD-10-CM

## 2020-12-10 DIAGNOSIS — S70.12XA CONTUSION OF LEFT THIGH, INITIAL ENCOUNTER: ICD-10-CM

## 2020-12-10 DIAGNOSIS — S09.90XA CLOSED HEAD INJURY, INITIAL ENCOUNTER: ICD-10-CM

## 2020-12-10 LAB
ABO GROUP BLD: NORMAL
ALBUMIN SERPL BCP-MCNC: 4.2 G/DL (ref 3.2–4.9)
ALBUMIN/GLOB SERPL: 1.2 G/DL
ALP SERPL-CCNC: 114 U/L (ref 30–99)
ALT SERPL-CCNC: 45 U/L (ref 2–50)
ANION GAP SERPL CALC-SCNC: 9 MMOL/L (ref 7–16)
APTT PPP: 29 SEC (ref 24.7–36)
AST SERPL-CCNC: 34 U/L (ref 12–45)
BILIRUB SERPL-MCNC: 0.4 MG/DL (ref 0.1–1.5)
BLD GP AB SCN SERPL QL: NORMAL
BUN SERPL-MCNC: 10 MG/DL (ref 8–22)
CALCIUM SERPL-MCNC: 9.5 MG/DL (ref 8.5–10.5)
CHLORIDE SERPL-SCNC: 99 MMOL/L (ref 96–112)
CO2 SERPL-SCNC: 24 MMOL/L (ref 20–33)
CREAT SERPL-MCNC: 0.72 MG/DL (ref 0.5–1.4)
ERYTHROCYTE [DISTWIDTH] IN BLOOD BY AUTOMATED COUNT: 43.5 FL (ref 35.9–50)
ETHANOL BLD-MCNC: <10.1 MG/DL (ref 0–10)
GLOBULIN SER CALC-MCNC: 3.4 G/DL (ref 1.9–3.5)
GLUCOSE SERPL-MCNC: 114 MG/DL (ref 65–99)
HCG SERPL QL: NEGATIVE
HCT VFR BLD AUTO: 43.9 % (ref 37–47)
HGB BLD-MCNC: 14.7 G/DL (ref 12–16)
INR PPP: 0.98 (ref 0.87–1.13)
MCH RBC QN AUTO: 28.7 PG (ref 27–33)
MCHC RBC AUTO-ENTMCNC: 33.5 G/DL (ref 33.6–35)
MCV RBC AUTO: 85.7 FL (ref 81.4–97.8)
PLATELET # BLD AUTO: 394 K/UL (ref 164–446)
PMV BLD AUTO: 8.7 FL (ref 9–12.9)
POTASSIUM SERPL-SCNC: 4.6 MMOL/L (ref 3.6–5.5)
PROT SERPL-MCNC: 7.6 G/DL (ref 6–8.2)
PROTHROMBIN TIME: 13.3 SEC (ref 12–14.6)
RBC # BLD AUTO: 5.12 M/UL (ref 4.2–5.4)
RH BLD: NORMAL
SODIUM SERPL-SCNC: 132 MMOL/L (ref 135–145)
WBC # BLD AUTO: 10.8 K/UL (ref 4.8–10.8)

## 2020-12-10 PROCEDURE — 85027 COMPLETE CBC AUTOMATED: CPT

## 2020-12-10 PROCEDURE — 72131 CT LUMBAR SPINE W/O DYE: CPT

## 2020-12-10 PROCEDURE — 700111 HCHG RX REV CODE 636 W/ 250 OVERRIDE (IP): Performed by: EMERGENCY MEDICINE

## 2020-12-10 PROCEDURE — 71045 X-RAY EXAM CHEST 1 VIEW: CPT

## 2020-12-10 PROCEDURE — 74177 CT ABD & PELVIS W/CONTRAST: CPT

## 2020-12-10 PROCEDURE — 84703 CHORIONIC GONADOTROPIN ASSAY: CPT

## 2020-12-10 PROCEDURE — 80053 COMPREHEN METABOLIC PANEL: CPT

## 2020-12-10 PROCEDURE — 72125 CT NECK SPINE W/O DYE: CPT

## 2020-12-10 PROCEDURE — 700117 HCHG RX CONTRAST REV CODE 255: Performed by: EMERGENCY MEDICINE

## 2020-12-10 PROCEDURE — 85730 THROMBOPLASTIN TIME PARTIAL: CPT

## 2020-12-10 PROCEDURE — 73552 X-RAY EXAM OF FEMUR 2/>: CPT | Mod: LT

## 2020-12-10 PROCEDURE — 85610 PROTHROMBIN TIME: CPT

## 2020-12-10 PROCEDURE — A9270 NON-COVERED ITEM OR SERVICE: HCPCS | Performed by: EMERGENCY MEDICINE

## 2020-12-10 PROCEDURE — 99284 EMERGENCY DEPT VISIT MOD MDM: CPT

## 2020-12-10 PROCEDURE — 86900 BLOOD TYPING SEROLOGIC ABO: CPT

## 2020-12-10 PROCEDURE — 700102 HCHG RX REV CODE 250 W/ 637 OVERRIDE(OP): Performed by: EMERGENCY MEDICINE

## 2020-12-10 PROCEDURE — 80307 DRUG TEST PRSMV CHEM ANLYZR: CPT

## 2020-12-10 PROCEDURE — 86901 BLOOD TYPING SEROLOGIC RH(D): CPT

## 2020-12-10 PROCEDURE — 70450 CT HEAD/BRAIN W/O DYE: CPT

## 2020-12-10 PROCEDURE — 86850 RBC ANTIBODY SCREEN: CPT

## 2020-12-10 PROCEDURE — 305948 HCHG GREEN TRAUMA ACT PRE-NOTIFY NO CC

## 2020-12-10 PROCEDURE — 72170 X-RAY EXAM OF PELVIS: CPT

## 2020-12-10 PROCEDURE — 73030 X-RAY EXAM OF SHOULDER: CPT | Mod: LT

## 2020-12-10 PROCEDURE — 72128 CT CHEST SPINE W/O DYE: CPT

## 2020-12-10 RX ORDER — HYDROCODONE BITARTRATE AND ACETAMINOPHEN 5; 325 MG/1; MG/1
1 TABLET ORAL ONCE
Status: COMPLETED | OUTPATIENT
Start: 2020-12-10 | End: 2020-12-10

## 2020-12-10 RX ORDER — ONDANSETRON 4 MG/1
4 TABLET, ORALLY DISINTEGRATING ORAL ONCE
Status: COMPLETED | OUTPATIENT
Start: 2020-12-10 | End: 2020-12-10

## 2020-12-10 RX ADMIN — IOHEXOL 100 ML: 350 INJECTION, SOLUTION INTRAVENOUS at 07:39

## 2020-12-10 RX ADMIN — HYDROCODONE BITARTRATE AND ACETAMINOPHEN 1 TABLET: 5; 325 TABLET ORAL at 09:09

## 2020-12-10 RX ADMIN — ONDANSETRON 4 MG: 4 TABLET, ORALLY DISINTEGRATING ORAL at 09:10

## 2020-12-10 NOTE — DISCHARGE INSTRUCTIONS
Expect some additional soreness.  Return for worsening pain or other concerns.  Take over-the-counter ibuprofen or Tylenol for discomfort as directed.    Follow-up with your doctor rest.  Drink plenty of fluids.
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
## 2020-12-15 ENCOUNTER — TELEMEDICINE (OUTPATIENT)
Dept: MEDICAL GROUP | Facility: LAB | Age: 25
End: 2020-12-15
Payer: COMMERCIAL

## 2020-12-15 VITALS — HEIGHT: 62 IN | HEART RATE: 96 BPM | WEIGHT: 193 LBS | BODY MASS INDEX: 35.51 KG/M2

## 2020-12-15 DIAGNOSIS — M54.50 ACUTE BILATERAL LOW BACK PAIN WITHOUT SCIATICA: ICD-10-CM

## 2020-12-15 DIAGNOSIS — M25.512 ACUTE PAIN OF BOTH SHOULDERS: ICD-10-CM

## 2020-12-15 DIAGNOSIS — M25.511 ACUTE PAIN OF BOTH SHOULDERS: ICD-10-CM

## 2020-12-15 PROCEDURE — 99213 OFFICE O/P EST LOW 20 MIN: CPT | Mod: 95,CR | Performed by: FAMILY MEDICINE

## 2020-12-15 RX ORDER — CYCLOBENZAPRINE HCL 5 MG
5-10 TABLET ORAL 3 TIMES DAILY PRN
Qty: 30 TAB | Refills: 1 | Status: SHIPPED | OUTPATIENT
Start: 2020-12-15 | End: 2021-12-14

## 2020-12-15 RX ORDER — PROPRANOLOL HYDROCHLORIDE 20 MG/1
20 TABLET ORAL DAILY
COMMUNITY
Start: 2020-12-04 | End: 2021-10-19

## 2020-12-15 ASSESSMENT — FIBROSIS 4 INDEX: FIB4 SCORE: 0.31
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
## 2020-12-18 DIAGNOSIS — Z23 NEED FOR VACCINATION: ICD-10-CM

## 2020-12-21 NOTE — TELEPHONE ENCOUNTER
Received request via: Pharmacy    Was the patient seen in the last year in this department? Yes  12/15/2020  Does the patient have an active prescription (recently filled or refills available) for medication(s) requested? No

## 2020-12-24 DIAGNOSIS — J45.20 MILD INTERMITTENT ASTHMA WITHOUT COMPLICATION: ICD-10-CM

## 2021-01-14 ENCOUNTER — IMMUNIZATION (OUTPATIENT)
Dept: FAMILY PLANNING/WOMEN'S HEALTH CLINIC | Facility: IMMUNIZATION CENTER | Age: 26
End: 2021-01-14
Attending: FAMILY MEDICINE
Payer: COMMERCIAL

## 2021-01-14 DIAGNOSIS — Z23 NEED FOR VACCINATION: ICD-10-CM

## 2021-01-14 DIAGNOSIS — Z23 ENCOUNTER FOR VACCINATION: Primary | ICD-10-CM

## 2021-01-14 PROCEDURE — 0001A PFIZER SARS-COV-2 VACCINE: CPT | Performed by: FAMILY MEDICINE

## 2021-01-14 PROCEDURE — 91300 PFIZER SARS-COV-2 VACCINE: CPT | Performed by: FAMILY MEDICINE

## 2021-02-05 ENCOUNTER — IMMUNIZATION (OUTPATIENT)
Dept: FAMILY PLANNING/WOMEN'S HEALTH CLINIC | Facility: IMMUNIZATION CENTER | Age: 26
End: 2021-02-05
Attending: INTERNAL MEDICINE
Payer: COMMERCIAL

## 2021-02-05 DIAGNOSIS — Z23 ENCOUNTER FOR VACCINATION: Primary | ICD-10-CM

## 2021-02-05 PROCEDURE — 0002A PFIZER SARS-COV-2 VACCINE: CPT

## 2021-02-05 PROCEDURE — 91300 PFIZER SARS-COV-2 VACCINE: CPT

## 2021-02-11 ENCOUNTER — PATIENT MESSAGE (OUTPATIENT)
Dept: MEDICAL GROUP | Facility: LAB | Age: 26
End: 2021-02-11

## 2021-02-11 RX ORDER — EPINEPHRINE 0.3 MG/.3ML
0.3 INJECTION SUBCUTANEOUS ONCE
Qty: 0.3 ML | Refills: 0 | Status: SHIPPED | OUTPATIENT
Start: 2021-02-11 | End: 2022-01-12

## 2021-02-11 NOTE — PATIENT COMMUNICATION
Received request via: Patient    Was the patient seen in the last year in this department? Yes  12/15/2020  Does the patient have an active prescription (recently filled or refills available) for medication(s) requested? No

## 2021-02-11 NOTE — TELEPHONE ENCOUNTER
----- Message from Chelsea Carrillo sent at 2/11/2021 10:06 AM PST -----  Regarding: Prescription Question  Contact: [phone]  Hello,   I am needing to refill my prescription for my epipen but no refills are left and my pen expires end of Feb.  can you please put a refill request in?     Thank you.

## 2021-03-10 RX ORDER — ALBUTEROL SULFATE 90 UG/1
AEROSOL, METERED RESPIRATORY (INHALATION)
Qty: 8.5 G | Refills: 1 | Status: SHIPPED | OUTPATIENT
Start: 2021-03-10 | End: 2021-08-19

## 2021-07-26 ENCOUNTER — PATIENT MESSAGE (OUTPATIENT)
Dept: MEDICAL GROUP | Facility: LAB | Age: 26
End: 2021-07-26

## 2021-07-26 DIAGNOSIS — J45.20 MILD INTERMITTENT ASTHMA WITHOUT COMPLICATION: ICD-10-CM

## 2021-08-19 RX ORDER — ALBUTEROL SULFATE 90 UG/1
AEROSOL, METERED RESPIRATORY (INHALATION)
Qty: 8.5 G | Refills: 1 | Status: SHIPPED | OUTPATIENT
Start: 2021-08-19 | End: 2022-01-12

## 2021-08-19 NOTE — TELEPHONE ENCOUNTER
Received request via: Pharmacy    Was the patient seen in the last year in this department? Yes  LOV 12/15/2020 - Telemedicine  Does the patient have an active prescription (recently filled or refills available) for medication(s) requested? No

## 2021-09-07 ENCOUNTER — TELEMEDICINE (OUTPATIENT)
Dept: SLEEP MEDICINE | Facility: MEDICAL CENTER | Age: 26
End: 2021-09-07
Payer: COMMERCIAL

## 2021-09-07 VITALS — WEIGHT: 205 LBS | HEIGHT: 62 IN | BODY MASS INDEX: 37.73 KG/M2

## 2021-09-07 DIAGNOSIS — G47.33 OSA (OBSTRUCTIVE SLEEP APNEA): ICD-10-CM

## 2021-09-07 DIAGNOSIS — F51.04 CHRONIC INSOMNIA: ICD-10-CM

## 2021-09-07 PROCEDURE — 99213 OFFICE O/P EST LOW 20 MIN: CPT | Mod: 95 | Performed by: NURSE PRACTITIONER

## 2021-09-07 ASSESSMENT — FIBROSIS 4 INDEX: FIB4 SCORE: 0.32
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
## 2021-09-30 ENCOUNTER — IMMUNIZATION (OUTPATIENT)
Dept: OCCUPATIONAL MEDICINE | Facility: CLINIC | Age: 26
End: 2021-09-30
Payer: COMMERCIAL

## 2021-09-30 DIAGNOSIS — Z23 NEED FOR VACCINATION: Primary | ICD-10-CM

## 2021-09-30 PROCEDURE — 90686 IIV4 VACC NO PRSV 0.5 ML IM: CPT | Performed by: PREVENTIVE MEDICINE

## 2021-10-11 ENCOUNTER — PATIENT MESSAGE (OUTPATIENT)
Dept: MEDICAL GROUP | Facility: LAB | Age: 26
End: 2021-10-11

## 2021-10-11 DIAGNOSIS — N92.6 MISSED PERIOD: ICD-10-CM

## 2021-10-12 ENCOUNTER — HOSPITAL ENCOUNTER (OUTPATIENT)
Dept: LAB | Facility: MEDICAL CENTER | Age: 26
End: 2021-10-12
Attending: FAMILY MEDICINE
Payer: COMMERCIAL

## 2021-10-12 DIAGNOSIS — N92.6 MISSED PERIOD: ICD-10-CM

## 2021-10-12 LAB — HCG SERPL QL: NEGATIVE

## 2021-10-12 PROCEDURE — 84703 CHORIONIC GONADOTROPIN ASSAY: CPT

## 2021-10-12 PROCEDURE — 36415 COLL VENOUS BLD VENIPUNCTURE: CPT

## 2021-10-19 ENCOUNTER — TELEMEDICINE (OUTPATIENT)
Dept: MEDICAL GROUP | Facility: LAB | Age: 26
End: 2021-10-19
Payer: COMMERCIAL

## 2021-10-19 ENCOUNTER — TELEPHONE (OUTPATIENT)
Dept: MEDICAL GROUP | Facility: LAB | Age: 26
End: 2021-10-19

## 2021-10-19 VITALS — WEIGHT: 211 LBS | HEIGHT: 62 IN | TEMPERATURE: 97.4 F | BODY MASS INDEX: 38.83 KG/M2

## 2021-10-19 DIAGNOSIS — R19.7 DIARRHEA, UNSPECIFIED TYPE: ICD-10-CM

## 2021-10-19 PROCEDURE — 99213 OFFICE O/P EST LOW 20 MIN: CPT | Mod: 95 | Performed by: PHYSICIAN ASSISTANT

## 2021-10-19 RX ORDER — CHOLESTYRAMINE 4 G/9G
1 POWDER, FOR SUSPENSION ORAL 2 TIMES DAILY
Qty: 14 EACH | Refills: 0 | Status: SHIPPED | OUTPATIENT
Start: 2021-10-19 | End: 2021-10-26

## 2021-10-19 RX ORDER — ONDANSETRON 4 MG/1
4 TABLET, FILM COATED ORAL EVERY 8 HOURS PRN
Qty: 21 TABLET | Refills: 0 | Status: SHIPPED | OUTPATIENT
Start: 2021-10-19 | End: 2021-10-26

## 2021-10-19 ASSESSMENT — FIBROSIS 4 INDEX: FIB4 SCORE: 0.32

## 2021-10-19 NOTE — LETTER
[facility]  [id number]     October 19, 2021    Patient: Chelsea Carrillo   YOB: 1995   Date of Visit: 10/19/2021       To Whom It May Concern:    Chelsea Carrillo was seen and treated in our department on 10/19/2021. Please excuse for the dates 10/18  /2021-10/20/2021    Sincerely,     Lizette Shearer P.A.-C.

## 2021-10-19 NOTE — TELEPHONE ENCOUNTER
Called pt to get her prefilled out on her vitals and chief compliant . Pt was told that I would call her back in a few minutes .

## 2021-10-19 NOTE — PROGRESS NOTES
Subjective:     CC: ***    HPI:   Chelsea here today with ***  N/V/D starting yesterday morning. Pt reports she was getting ready for work and suddenly threw up    Denies any recent changes in diet or medication    Pt has not been drinking fluids    Denies fevers, chills    Reports some abdominal cramping associated with diarrhea.    Pt reports 1 episode of bloody stools with wiping. She has had bowel movements since without blood    Denies any episodes of vomiting up blood    Denies sick contacts via other family members    Denies recently eating out.     Denies hx of kidney problems    She has not had her period, approximately 3 weeks late        No problems updated.      ROS:  Gen: no fevers/chills, no changes in weight  Eyes: no changes in vision  ENT: no sore throat, no hearing loss, no bloody nose  Pulm: no sob, no cough  CV: no chest pain, no palpitations  GI: no nausea/vomiting, no diarrhea  : no dysuria  MSk: no myalgias  Skin: no rash  Neuro: no headaches, no numbness/tingling  Heme/Lymph: no easy bruising    Current Outpatient Medications Ordered in Epic   Medication Sig Dispense Refill   • albuterol 108 (90 Base) MCG/ACT Aero Soln inhalation aerosol INHALE ONE TO TWO PUFFS BY MOUTH EVERY 6 HOURS AS NEEDED FOR SHORTNESS OF BREATH 8.5 g 1   • fluticasone-salmeterol (WIXELA INHUB) 250-50 MCG/DOSE AEROSOL POWDER, BREATH ACTIVATED Inhale 1 Puff 2 times a day. 1 Each 3   • propranolol (INDERAL) 20 MG Tab Take 20 mg by mouth every day.     • cyclobenzaprine (FLEXERIL) 5 mg tablet Take 1-2 Tabs by mouth 3 times a day as needed for Moderate Pain. 30 Tab 1   • sertraline (ZOLOFT) 50 MG Tab Take 75 mg by mouth every day.     • omeprazole (PRILOSEC) 20 MG delayed-release capsule Take 40 mg by mouth 2 times a day.     • KURVELO 0.15-30 MG-MCG per tablet Take 1 Tab by mouth every day. (Patient not taking: Reported on 11/4/2020) 28 Tab 11   • betamethasone dipropionate (DIPROLENE) 0.05 % Cream BETAMETHASONE  "DIPROPIONATE 0.05 % CREA       No current Epic-ordered facility-administered medications on file.       Health Maintenance: {COMPLETED:778518}    Objective:     Exam:  Temp 36.3 °C (97.4 °F)   Ht 1.575 m (5' 2\")   Wt 95.7 kg (211 lb)   BMI 38.59 kg/m²  Body mass index is 38.59 kg/m².    Gen: Alert and oriented, No apparent distress.  Neck: Neck is supple without lymphadenopathy.  Lungs: Normal effort, CTA bilaterally, no wheezes, rhonchi, or rales  CV: Regular rate and rhythm. No murmurs, rubs, or gallops.  Ext: No clubbing, cyanosis, edema.    A chaperone was offered to the patient during today's exam. {CHAPERONE:10952}    Labs: ***    Assessment & Plan:     26 y.o. female with the following -     Problem List Items Addressed This Visit     None        Instructed to get UPT, if negative consider repeat serum HCG  I spent a total of *** minutes with record review, exam, communication with the patient, communication with other providers, and documentation of this encounter.      No follow-ups on file.    Please note that this dictation was created using voice recognition software. I have made every reasonable attempt to correct obvious errors, but there may be errors of grammar and possibly content that I did not discover before finalizing the note.      "

## 2021-10-19 NOTE — PROGRESS NOTES
Virtual Visit: Established Patient   This visit was conducted via Zoom using secure and encrypted videoconferencing technology.   The patient was in a private location in the state of [place].    The patient's identity was confirmed and verbal consent was obtained for this virtual visit.    Subjective:   CC:   Chief Complaint   Patient presents with   • Emesis     x 1 day    • Diarrhea   • Fatigue     Chelsea Carrillo is a 26 y.o. female presenting for evaluation and management of:    HPI:   Chelsea here today with nausea, 1 episode of vomiting yesterday morning and several subsequent episodes of diarrhea.  Symptoms started yesterday patient denies any changes in diet or medication.  Denies any suspicious foods or recently eating out.  Denies sick contacts via family members.  Patient reports some abdominal cramping associated with the diarrhea.  She also reports one episode of bloody stool with wiping, she has had subsequent bowel movements without blood.  Denies vomiting blood    Patient reports that her fluid intake has been low because she does not want to venture far from the toilet.  She does believe that she would be able to keep fluids down however.  Additionally, patient notes her period is approximately 3 weeks late.  She had a negative serum pregnancy test dated 10/12/2021      ROS:  Gen: no fevers/chills,  Eyes: no changes in vision  Pulm: no sob, no cough  CV: no chest pain, no palpitations  GI: + nausea/vomiting, + diarrhea + cramping  : no dysuria  MSk: no myalgias  Skin: no rash  Neuro: no headaches    Current medicines (including changes today)  Current Outpatient Medications   Medication Sig Dispense Refill   • ondansetron (ZOFRAN) 4 MG Tab tablet Take 1 Tablet by mouth every 8 hours as needed for Nausea/Vomiting for up to 7 days. 21 Tablet 0   • cholestyramine (QUESTRAN) 4 g packet Take 4 g by mouth 2 times a day for 7 days. 14 Each 0   • albuterol 108 (90 Base) MCG/ACT Aero Soln inhalation  "aerosol INHALE ONE TO TWO PUFFS BY MOUTH EVERY 6 HOURS AS NEEDED FOR SHORTNESS OF BREATH 8.5 g 1   • fluticasone-salmeterol (WIXELA INHUB) 250-50 MCG/DOSE AEROSOL POWDER, BREATH ACTIVATED Inhale 1 Puff 2 times a day. 1 Each 3   • propranolol (INDERAL) 20 MG Tab Take 20 mg by mouth every day.     • cyclobenzaprine (FLEXERIL) 5 mg tablet Take 1-2 Tabs by mouth 3 times a day as needed for Moderate Pain. 30 Tab 1   • sertraline (ZOLOFT) 50 MG Tab Take 75 mg by mouth every day.     • omeprazole (PRILOSEC) 20 MG delayed-release capsule Take 40 mg by mouth 2 times a day.     • KURVELO 0.15-30 MG-MCG per tablet Take 1 Tab by mouth every day. (Patient not taking: Reported on 11/4/2020) 28 Tab 11   • betamethasone dipropionate (DIPROLENE) 0.05 % Cream BETAMETHASONE DIPROPIONATE 0.05 % CREA       No current facility-administered medications for this visit.       Patient Active Problem List    Diagnosis Date Noted   • Obesity 03/18/2020   • Former smoker 03/16/2020   • DANISHA (obstructive sleep apnea) 03/16/2020   • Mild intermittent asthma without complication 11/22/2019   • Other insomnia 11/22/2019   • Body mass index 34.0-34.9, adult 09/20/2019        Objective:   Temp 36.3 °C (97.4 °F)   Ht 1.575 m (5' 2\")   Wt 95.7 kg (211 lb)   BMI 38.59 kg/m²     Physical Exam:  Constitutional: Alert, no distress, well-groomed.  Skin: No rashes in visible areas.  Eye: Round. Conjunctiva clear, lids normal. No icterus.   ENMT: Lips pink without lesions, good dentition, moist mucous membranes. Phonation normal.  Neck: No masses, no thyromegaly. Moves freely without pain.  Respiratory: Unlabored respiratory effort, no cough or audible wheeze  Psych: Alert and oriented x3, normal affect and mood.     Assessment and Plan:   The following treatment plan was discussed:     Diarrhea  Suspect viral etiology.  Given that patient is 3 weeks late in her menstrual cycle and is now also experiencing nausea/vomiting/diarrhea would also like to rule " out pregnancy.    Plan:    - Labs/tests: Instructed patient to do at home urine pregnancy test, if negative and period continues to be late we will repeat serum pregnancy test    - Treatments: Zofran 4 mg 1 tablet every 8 hours as needed for 7 days  Questran 4 g packet, 1 packet by mouth twice a day as needed for 7 days    - Counseling/Pt Education: Push fluids especially dilute Gatorade or Pedialyte, bland diet as tolerated.  Patient was also given a letter excusing her from work from 10/18/2021 through 10/20/2021    - F/U Timing & Contingencies: discussed red flag symptoms and indications for return/ER      Follow-up: Return if symptoms worsen or fail to improve.

## 2021-10-20 ENCOUNTER — HOSPITAL ENCOUNTER (OUTPATIENT)
Dept: LAB | Facility: MEDICAL CENTER | Age: 26
End: 2021-10-20
Attending: PHYSICIAN ASSISTANT
Payer: COMMERCIAL

## 2021-10-20 DIAGNOSIS — N91.2 AMENORRHEA: ICD-10-CM

## 2021-10-20 LAB — HCG SERPL QL: NEGATIVE

## 2021-10-20 PROCEDURE — 84703 CHORIONIC GONADOTROPIN ASSAY: CPT

## 2021-10-20 PROCEDURE — 36415 COLL VENOUS BLD VENIPUNCTURE: CPT

## 2021-12-14 ENCOUNTER — OFFICE VISIT (OUTPATIENT)
Dept: MEDICAL GROUP | Facility: LAB | Age: 26
End: 2021-12-14
Payer: COMMERCIAL

## 2021-12-14 VITALS
SYSTOLIC BLOOD PRESSURE: 120 MMHG | HEIGHT: 62 IN | TEMPERATURE: 97.4 F | BODY MASS INDEX: 37.73 KG/M2 | WEIGHT: 205 LBS | DIASTOLIC BLOOD PRESSURE: 70 MMHG | HEART RATE: 107 BPM | OXYGEN SATURATION: 96 %

## 2021-12-14 DIAGNOSIS — Z3A.01 LESS THAN 8 WEEKS GESTATION OF PREGNANCY: ICD-10-CM

## 2021-12-14 LAB
INT CON NEG: NEGATIVE
INT CON POS: POSITIVE
POC URINE PREGNANCY TEST: POSITIVE

## 2021-12-14 PROCEDURE — 81025 URINE PREGNANCY TEST: CPT | Performed by: FAMILY MEDICINE

## 2021-12-14 PROCEDURE — 99214 OFFICE O/P EST MOD 30 MIN: CPT | Performed by: FAMILY MEDICINE

## 2021-12-14 RX ORDER — DOXYLAMINE SUCCINATE AND PYRIDOXINE HYDROCHLORIDE, DELAYED RELEASE TABLETS 10 MG/10 MG 10; 10 MG/1; MG/1
TABLET, DELAYED RELEASE ORAL
Qty: 60 TABLET | Refills: 2 | Status: SHIPPED | OUTPATIENT
Start: 2021-12-14 | End: 2022-09-15

## 2021-12-14 ASSESSMENT — PATIENT HEALTH QUESTIONNAIRE - PHQ9: CLINICAL INTERPRETATION OF PHQ2 SCORE: 0

## 2021-12-14 ASSESSMENT — FIBROSIS 4 INDEX: FIB4 SCORE: 0.32

## 2021-12-14 NOTE — PROGRESS NOTES
"Subjective:     CC: Pregnancy    HPI:   Chelsea presents today requesting pregnancy test. She has had multiple home positive pregnancy tests. LMP ~ 10/24, periods have been regular prior to this  . She mainly started taking pregnancy tests because as she has had some intermittent nausea and occasional vomiting. Zofran has helped and she is able to eat and drink well most of the time.    Medications, past medical history, allergies, and social history have been reviewed and updated.      Objective:       Exam:  /70 (BP Location: Left arm, Patient Position: Sitting, BP Cuff Size: Adult)   Pulse (!) 107   Temp 36.3 °C (97.4 °F) (Temporal)   Ht 1.575 m (5' 2\")   Wt 93 kg (205 lb)   SpO2 96%   BMI 37.49 kg/m²  Body mass index is 37.49 kg/m².    Constitutional: Alert. Well appearing. No distress.  Skin: Warm, dry, good turgor, no visible rashes.  Eye: Equal, round and reactive to light, conjunctiva clear, lids normal.  ENMT: Moist mucous membranes.  Respiratory: Normal effort. Lungs are clear to auscultation bilaterally.  Cardiovascular: Regular rate and rhythm. Normal S1/S2. No murmurs, rubs or gallops.   Neuro: Moves all four extremities. No facial droop.  Psych: Answers questions appropriately. Normal affect and mood.      Assessment & Plan:     26 y.o. female with the following -     1. Less than 8 weeks gestation of pregnancy   at 7w2d by LMP. Having some intermittent nausea and vomiting and will try Diclegis. Anticipatory guidance discussed, she will plan to establish with OB/GYN. Continue current meds for now. Discussed reasons to seek care.  - Doxylamine-Pyridoxine 10-10 MG Tablet Delayed Response delayed-release tablet; Take two tablets at bedtime on day 1 and 2; if symptoms persist, take 1 tablet in morning and 2 tablets at bedtime on day 3; if symptoms persist, may increase to 1 tablet in morning, 1 tablet mid-afternoon, and 2 tablets at bedtime on day 4 (maximum: 4 tablets per day)  " Dispense: 60 Tablet; Refill: 2  - POCT Pregnancy    Please note that this note was created using voice recognition software.

## 2021-12-29 ENCOUNTER — HOSPITAL ENCOUNTER (EMERGENCY)
Facility: MEDICAL CENTER | Age: 26
End: 2021-12-29
Attending: EMERGENCY MEDICINE
Payer: COMMERCIAL

## 2021-12-29 ENCOUNTER — APPOINTMENT (OUTPATIENT)
Dept: RADIOLOGY | Facility: MEDICAL CENTER | Age: 26
End: 2021-12-29
Attending: EMERGENCY MEDICINE
Payer: COMMERCIAL

## 2021-12-29 VITALS
OXYGEN SATURATION: 95 % | SYSTOLIC BLOOD PRESSURE: 141 MMHG | BODY MASS INDEX: 36.92 KG/M2 | HEART RATE: 96 BPM | TEMPERATURE: 97.8 F | HEIGHT: 62 IN | WEIGHT: 200.62 LBS | RESPIRATION RATE: 16 BRPM | DIASTOLIC BLOOD PRESSURE: 82 MMHG

## 2021-12-29 DIAGNOSIS — N93.9 VAGINAL BLEEDING: ICD-10-CM

## 2021-12-29 DIAGNOSIS — O20.0 THREATENED MISCARRIAGE IN EARLY PREGNANCY: ICD-10-CM

## 2021-12-29 PROCEDURE — 76801 OB US < 14 WKS SINGLE FETUS: CPT

## 2021-12-29 PROCEDURE — 99284 EMERGENCY DEPT VISIT MOD MDM: CPT

## 2021-12-29 ASSESSMENT — FIBROSIS 4 INDEX: FIB4 SCORE: 0.32

## 2021-12-29 NOTE — ED TRIAGE NOTES
Chief Complaint   Patient presents with   • Vaginal Bleeding     onset sunday, light spotting. Also reports some brown discharge.   • Pregnancy     LMP end of october, PCP confirmed positive pregnancy test. No OB visit yet for this pregnancy     ED Triage Vitals [12/29/21 1544]   Enc Vitals Group      Blood Pressure 143/96      Pulse (!) 124      Respiration 16      Temperature 36.6 °C (97.9 °F)      Temp src Temporal      Pulse Oximetry 96 %     Also reports mild cramping that began around midday today.

## 2021-12-30 NOTE — ED PROVIDER NOTES
ED Provider Note    Scribed for Dr. Mike Murphy M.D. by Elmira Larson. 2021  5:15 PM    Primary care provider: Cornelio Hernandez M.D.  Means of arrival: Walk in  History obtained from: Patient  History limited by: None    CHIEF COMPLAINT  Chief Complaint   Patient presents with    Vaginal Bleeding     onset , light spotting. Also reports some brown discharge.    Pregnancy     LMP end of october, PCP confirmed positive pregnancy test. No OB visit yet for this pregnancy       HPI  Chelsea Carrillo is a 26 y.o. female, currently pregnant, who presents to the Emergency Department for ongoing vaginal bleeding with an onset of 3 days ago. She states she is mostly spotting and noticed some blood upon wiping. Her LMP was the end of October. Pregnancy was confirmed by her PCP. She has not followed up with a OB yet. This is currently her third pregnancy. She was not medicated prior to arrival. She reports associated brown vaginal discharge, mild suprapubic abdominal cramping, nausea, and vomiting. She denies any associated fever, chills, or dizziness. No alleviating or exacerbating factors were identified. Blood type is AB+    REVIEW OF SYSTEMS  Pertinent positives include vaginal bleeding, vaginal discharge, abdominal cramping, nausea, and vomiting. Pertinent negatives include no fever, chills, or dizziness. As above, all other systems reviewed and are negative.   See HPI for further details.     PAST MEDICAL HISTORY   has a past medical history of Anxiety (), Asthma, Bronchitis, Depression (2015), Heart murmur, Influenza, Nasal drainage, and Pneumonia.    SURGICAL HISTORY   has a past surgical history that includes tonsillectomy.    SOCIAL HISTORY  Social History     Tobacco Use    Smoking status: Former Smoker     Packs/day: 0.00     Years: 5.00     Pack years: 0.00     Quit date:      Years since quittin.9    Smokeless tobacco: Never Used   Vaping Use    Vaping Use: Never used   Substance  "Use Topics    Alcohol use: Not Currently    Drug use: Never      Social History     Substance and Sexual Activity   Drug Use Never       FAMILY HISTORY  Family History   Problem Relation Age of Onset    Sleep Apnea Mother     Sleep Apnea Father        CURRENT MEDICATIONS  Home Medications       Reviewed by Cornelio Hamm R.N. (Registered Nurse) on 12/29/21 at 1545  Med List Status: Complete     Medication Last Dose Status   albuterol 108 (90 Base) MCG/ACT Aero Soln inhalation aerosol  Active   betamethasone dipropionate (DIPROLENE) 0.05 % Cream  Active   Doxylamine-Pyridoxine 10-10 MG Tablet Delayed Response delayed-release tablet  Active   fluticasone-salmeterol (WIXELA INHUB) 250-50 MCG/DOSE AEROSOL POWDER, BREATH ACTIVATED  Active   sertraline (ZOLOFT) 50 MG Tab  Active                    ALLERGIES  Allergies   Allergen Reactions    Penicillin G      Other reaction(s): unknown- reaction happened when she was a little girl    Pcn [Penicillins]      hives       PHYSICAL EXAM  VITAL SIGNS: /96   Pulse (!) 124   Temp 36.6 °C (97.9 °F) (Temporal)   Resp 16   Ht 1.575 m (5' 2\")   Wt 91 kg (200 lb 9.9 oz)   LMP 10/27/2021 (Within Weeks)   SpO2 96%   BMI 36.69 kg/m²     Constitutional: Well developed, Well nourished, No distress, Non-toxic appearance.   HENT: Normocephalic, Atraumatic, Bilateral external ears normal, Oropharynx moist, No oral exudates.   Neck: No tenderness, Supple, No stridor.   Cardiovascular: Tachycardic, Normal rhythm.   Thorax & Lungs: Clear to auscultation bilaterally, No respiratory distress, No wheezing, No crackles.   Abdomen: Soft, No tenderness, No masses, No pulsatile masses.      Skin: Warm, Dry, No erythema, No rash.   Extremities:, No edema No cyanosis.   Musculoskeletal: No tenderness to palpation or major deformities noted.  Intact distal pulses  Neurologic: Awake, alert. Moves all extremities spontaneously.  Psychiatric: Affect normal, Judgment normal, Mood normal. "     RADIOLOGY  US-OB 1ST TRIMESTER WITH TRANSVAGINAL (COMBO)   Final Result      Viable single intrauterine gestation of an estimated gestational age of 6 weeks 5 days.        The radiologist's interpretation of all radiological studies have been reviewed by me.    COURSE & MEDICAL DECISION MAKING  Pertinent Labs & Imaging studies reviewed. (See chart for details)    5:15 PM - Patient seen and examined at bedside. Ordered US-OB 1st Trimester Transvaginal to evaluate her symptoms. The differential diagnoses include but are not limited to: threatened miscarriage, ectopic pregnancy.  Discussed plan of care with patient. I informed her an ultrasound study will be obtained to evaluate. We will also perform a pelvic exam with her nurse. She is understanding and agreeable to plan.     6:02 PM - Patient was reevaluated at bedside. She is resting comfortably in bed feeling unchanged. Discussed ultrasound findings with the patient and informed her they were reassuring. She is 6 weeks and 5 days pregnant. The patient will return for new or worsening symptoms and is stable at the time of discharge. Patient verbalizes understanding and agreement to this plan of care.          Decision Making:  The patient with first trimester bleeding ultrasound is normal only minimal cramping miscarriage seems unlikely, discussed possibility is still exist, no specific treatment other than but not better pelvic rest    DISPOSITION:  Patient will be discharged home in stable condition.    FOLLOW UP:  Follow up with your PCP and OB.     The patient is referred to a primary physician for blood pressure management, diabetic screening, and for all other preventative health concerns.    FINAL IMPRESSION  1. Threatened miscarriage in early pregnancy    2. Vaginal bleeding          Elmira ANDERSON (Anthony), am scribing for, and in the presence of, Mike Murphy M.D..    Electronically signed by: Elmira Larson (Anthony), 12/29/2021    Mike ANDERSON  GAIL Murphy. personally performed the services described in this documentation, as scribed by Elmira Larson in my presence, and it is both accurate and complete.    C    The note accurately reflects work and decisions made by me.  Mike Murphy M.D.  12/29/2021  9:40 PM

## 2021-12-30 NOTE — ED NOTES
Discharge instructions provided.  Pt verbalized the understanding of discharge instructions to follow up with OBGYN and to return to ER if condition worsens.  Pt ambulated out of ER without difficulty.

## 2022-01-01 ENCOUNTER — APPOINTMENT (OUTPATIENT)
Dept: RADIOLOGY | Facility: MEDICAL CENTER | Age: 27
End: 2022-01-01
Attending: EMERGENCY MEDICINE
Payer: COMMERCIAL

## 2022-01-01 ENCOUNTER — HOSPITAL ENCOUNTER (EMERGENCY)
Facility: MEDICAL CENTER | Age: 27
End: 2022-01-01
Attending: EMERGENCY MEDICINE
Payer: COMMERCIAL

## 2022-01-01 VITALS
SYSTOLIC BLOOD PRESSURE: 146 MMHG | HEIGHT: 62 IN | HEART RATE: 112 BPM | DIASTOLIC BLOOD PRESSURE: 78 MMHG | BODY MASS INDEX: 36.88 KG/M2 | RESPIRATION RATE: 18 BRPM | OXYGEN SATURATION: 96 % | WEIGHT: 200.4 LBS | TEMPERATURE: 98.5 F

## 2022-01-01 DIAGNOSIS — O03.4 INCOMPLETE MISCARRIAGE: ICD-10-CM

## 2022-01-01 LAB
B-HCG SERPL-ACNC: 3650 MIU/ML (ref 0–10)
BASOPHILS # BLD AUTO: 0.3 % (ref 0–1.8)
BASOPHILS # BLD: 0.06 K/UL (ref 0–0.12)
EOSINOPHIL # BLD AUTO: 0.36 K/UL (ref 0–0.51)
EOSINOPHIL NFR BLD: 2.1 % (ref 0–6.9)
ERYTHROCYTE [DISTWIDTH] IN BLOOD BY AUTOMATED COUNT: 43.2 FL (ref 35.9–50)
HCT VFR BLD AUTO: 42.6 % (ref 37–47)
HGB BLD-MCNC: 14.3 G/DL (ref 12–16)
IMM GRANULOCYTES # BLD AUTO: 0.06 K/UL (ref 0–0.11)
IMM GRANULOCYTES NFR BLD AUTO: 0.3 % (ref 0–0.9)
LYMPHOCYTES # BLD AUTO: 3.38 K/UL (ref 1–4.8)
LYMPHOCYTES NFR BLD: 19.5 % (ref 22–41)
MCH RBC QN AUTO: 28.4 PG (ref 27–33)
MCHC RBC AUTO-ENTMCNC: 33.6 G/DL (ref 33.6–35)
MCV RBC AUTO: 84.5 FL (ref 81.4–97.8)
MONOCYTES # BLD AUTO: 0.87 K/UL (ref 0–0.85)
MONOCYTES NFR BLD AUTO: 5 % (ref 0–13.4)
NEUTROPHILS # BLD AUTO: 12.6 K/UL (ref 2–7.15)
NEUTROPHILS NFR BLD: 72.8 % (ref 44–72)
NRBC # BLD AUTO: 0 K/UL
NRBC BLD-RTO: 0 /100 WBC
NUMBER OF RH DOSES IND 8505RD: NORMAL
PLATELET # BLD AUTO: 443 K/UL (ref 164–446)
PMV BLD AUTO: 8.3 FL (ref 9–12.9)
RBC # BLD AUTO: 5.04 M/UL (ref 4.2–5.4)
RH BLD: NORMAL
WBC # BLD AUTO: 17.3 K/UL (ref 4.8–10.8)

## 2022-01-01 PROCEDURE — 86901 BLOOD TYPING SEROLOGIC RH(D): CPT

## 2022-01-01 PROCEDURE — 85025 COMPLETE CBC W/AUTO DIFF WBC: CPT

## 2022-01-01 PROCEDURE — 76801 OB US < 14 WKS SINGLE FETUS: CPT

## 2022-01-01 PROCEDURE — 99284 EMERGENCY DEPT VISIT MOD MDM: CPT

## 2022-01-01 PROCEDURE — 84702 CHORIONIC GONADOTROPIN TEST: CPT

## 2022-01-01 ASSESSMENT — FIBROSIS 4 INDEX: FIB4 SCORE: 0.32

## 2022-01-02 NOTE — ED NOTES
Pt cleared for dc home. Both pt and  verbalized understanding no further questions to f/u with OBGYN as soon as possible. Advised to return to ED for any worsening symptoms

## 2022-01-02 NOTE — ED NOTES
PT arrives to ED c/o worsening vaginal bleeding & cramping approx 7-8wks pregnant. Was seen in ED for similar symptoms and was told to return if bleeding or cramping worsened  Pt is upset with  at bedside. Reports that prior to arrival she saw tissue while she was using the restroom. States that its bright red blood with some clots not enough to soak a pad.

## 2022-01-02 NOTE — ED PROVIDER NOTES
ED Provider Note    CHIEF COMPLAINT  Chief Complaint   Patient presents with   • Vaginal Bleeding     States she is approx 7-8 weeks pregnant. States she was here on Wednesday for vaginal spotting, states she was told to come back if bleeding got worse. States her heavy bleeding started today.        HPI  Chelsea Carrillo is a 26 y.o. female who presents evaluation of crampy lower abdominal pain and passage of some clots and tissue.  The patient was seen here 2 days ago for threatened miscarriage.  She did have intrauterine pregnancy confirmed.  She reports that this is her first pregnancy she denies any high fevers or chills no burning with urination.  She passed some clots and some tissue earlier this evening.  No other symptoms reported    REVIEW OF SYSTEMS  See HPI for further details.  No high fevers chills night sweats or weight loss all other systems are negative.     PAST MEDICAL HISTORY  Past Medical History:   Diagnosis Date   • Anxiety    • Depression    • Asthma    • Bronchitis    • Heart murmur    • Influenza    • Nasal drainage    • Pneumonia        FAMILY HISTORY  Noncontributory    SOCIAL HISTORY  Social History     Socioeconomic History   • Marital status:      Spouse name: Not on file   • Number of children: Not on file   • Years of education: Not on file   • Highest education level: Not on file   Occupational History   • Not on file   Tobacco Use   • Smoking status: Former Smoker     Packs/day: 0.00     Years: 5.00     Pack years: 0.00     Quit date:      Years since quitting: 3.0   • Smokeless tobacco: Never Used   Vaping Use   • Vaping Use: Never used   Substance and Sexual Activity   • Alcohol use: Not Currently   • Drug use: Never   • Sexual activity: Not on file   Other Topics Concern   • Not on file   Social History Narrative    ** Merged History Encounter **         Works in [employer] center     Social Determinants of Health     Financial Resource Strain:     • Difficulty of Paying Living Expenses: Not on file   Food Insecurity:    • Worried About Running Out of Food in the Last Year: Not on file   • Ran Out of Food in the Last Year: Not on file   Transportation Needs:    • Lack of Transportation (Medical): Not on file   • Lack of Transportation (Non-Medical): Not on file   Physical Activity:    • Days of Exercise per Week: Not on file   • Minutes of Exercise per Session: Not on file   Stress:    • Feeling of Stress : Not on file   Social Connections:    • Frequency of Communication with Friends and Family: Not on file   • Frequency of Social Gatherings with Friends and Family: Not on file   • Attends [employer] Services: Not on file   • Active Member of Clubs or Organizations: Not on file   • Attends Club or Organization Meetings: Not on file   • Marital Status: Not on file   Intimate Partner Violence:    • Fear of Current or Ex-Partner: Not on file   • Emotionally Abused: Not on file   • Physically Abused: Not on file   • Sexually Abused: Not on file   Housing Stability:    • Unable to Pay for Housing in the Last Year: Not on file   • Number of Places Lived in the Last Year: Not on file   • Unstable Housing in the Last Year: Not on file       SURGICAL HISTORY  Past Surgical History:   Procedure Laterality Date   • TONSILLECTOMY         CURRENT MEDICATIONS  Home Medications     Reviewed by Gavino Correa R.N. (Registered Nurse) on 01/01/22 at 2044  Med List Status: Not Addressed   Medication Last Dose Status   albuterol 108 (90 Base) MCG/ACT Aero Soln inhalation aerosol  Active   betamethasone dipropionate (DIPROLENE) 0.05 % Cream  Active   Doxylamine-Pyridoxine 10-10 MG Tablet Delayed Response delayed-release tablet  Active   fluticasone-salmeterol (WIXELA INHUB) 250-50 MCG/DOSE AEROSOL POWDER, BREATH ACTIVATED  Active   sertraline (ZOLOFT) 50 MG Tab  Active                ALLERGIES  Allergies   Allergen Reactions   • Penicillin G      Other reaction(s):  "unknown- reaction happened when she was a little girl   • Pcn [Penicillins]      hives       PHYSICAL EXAM  VITAL SIGNS: /91   Pulse (!) 112   Temp 36.9 °C (98.5 °F) (Temporal)   Resp 18   Ht 1.575 m (5' 2\")   Wt 90.9 kg (200 lb 6.4 oz)   LMP 10/27/2021 (Within Weeks)   SpO2 95%   BMI 36.65 kg/m²       Constitutional: Tearful  HENT: Normocephalic, Atraumatic, Bilateral external ears normal, Oropharynx moist, No oral exudates, Nose normal.   Eyes: PERRLA, EOMI, Conjunctiva normal, No discharge.   Neck: Normal range of motion, No tenderness, Supple, No stridor.   Cardiovascular: Tachycardic, Normal rhythm, No murmurs, No rubs, No gallops.   Thorax & Lungs: Normal breath sounds, No respiratory distress, No wheezing, No chest tenderness.   Abdomen: Bowel sounds normal, Soft, No tenderness, No masses, No pulsatile masses.   Skin: Warm, Dry, No erythema, No rash.   Extremities: Intact distal pulses, No edema, No tenderness, No cyanosis, No clubbing.   Musculoskeletal: Good range of motion in all major joints. No tenderness to palpation or major deformities noted.   Neurologic: Alert & oriented x 3, Normal motor function, Normal sensory function, No focal deficits noted.   Psychiatric: Anxious    US-OB 1ST TRIMESTER WITH TRANSVAGINAL (COMBO)   Final Result      1.  Single intrauterine gestation with low position of the gestational sac and no fetal cardiac activity, likely spontaneous  in progress.   2.  Small subchorionic hemorrhage present.   3.  Probable LEFT ovary corpus luteum.        Preliminary read on ultrasound suggests miscarriage with intrauterine fetal demise  COURSE & MEDICAL DECISION MAKING  Pertinent Labs & Imaging studies reviewed. (See chart for details)  Results for orders placed or performed during the hospital encounter of 22   CBC WITH DIFFERENTIAL   Result Value Ref Range    WBC 17.3 (H) 4.8 - 10.8 K/uL    RBC 5.04 4.20 - 5.40 M/uL    Hemoglobin 14.3 12.0 - 16.0 g/dL    " Hematocrit 42.6 37.0 - 47.0 %    MCV 84.5 81.4 - 97.8 fL    MCH 28.4 27.0 - 33.0 pg    MCHC 33.6 33.6 - 35.0 g/dL    RDW 43.2 35.9 - 50.0 fL    Platelet Count 443 164 - 446 K/uL    MPV 8.3 (L) 9.0 - 12.9 fL    Neutrophils-Polys 72.80 (H) 44.00 - 72.00 %    Lymphocytes 19.50 (L) 22.00 - 41.00 %    Monocytes 5.00 0.00 - 13.40 %    Eosinophils 2.10 0.00 - 6.90 %    Basophils 0.30 0.00 - 1.80 %    Immature Granulocytes 0.30 0.00 - 0.90 %    Nucleated RBC 0.00 /100 WBC    Neutrophils (Absolute) 12.60 (H) 2.00 - 7.15 K/uL    Lymphs (Absolute) 3.38 1.00 - 4.80 K/uL    Monos (Absolute) 0.87 (H) 0.00 - 0.85 K/uL    Eos (Absolute) 0.36 0.00 - 0.51 K/uL    Baso (Absolute) 0.06 0.00 - 0.12 K/uL    Immature Granulocytes (abs) 0.06 0.00 - 0.11 K/uL    NRBC (Absolute) 0.00 K/uL   HCG QUANTITATIVE   Result Value Ref Range    Bhcg 3650.0 (H) 0.0 - 10.0 mIU/mL     IV was established.  Reviewed the patient's records.  She is Rh+ and does not require RhoGam.  Hemoglobin is normal at 14.  hCG level is 3650.  Patient apparently was already referred to Dr. B[facility]eo with [facility] and has an appointment in a few days.  I counseled her that if she develops increased bleeding already other issues to go to [facility].    FINAL IMPRESSION  1.  First trimester intrauterine fetal demise         Electronically signed by: Rome Weaver M.D., 1/1/2022 9:05 PM

## 2022-01-06 ENCOUNTER — HOSPITAL ENCOUNTER (OUTPATIENT)
Facility: MEDICAL CENTER | Age: 27
End: 2022-01-06
Attending: OBSTETRICS & GYNECOLOGY
Payer: COMMERCIAL

## 2022-01-06 PROCEDURE — 84702 CHORIONIC GONADOTROPIN TEST: CPT

## 2022-01-07 LAB — B-HCG SERPL-ACNC: 58.4 MIU/ML (ref 0–5)

## 2022-01-12 RX ORDER — EPINEPHRINE 0.3 MG/.3ML
INJECTION SUBCUTANEOUS
Qty: 2 EACH | Refills: 1 | Status: ON HOLD | OUTPATIENT
Start: 2022-01-12 | End: 2024-01-21

## 2022-01-12 RX ORDER — ALBUTEROL SULFATE 90 UG/1
AEROSOL, METERED RESPIRATORY (INHALATION)
Qty: 8.5 G | Refills: 1 | Status: SHIPPED | OUTPATIENT
Start: 2022-01-12 | End: 2022-04-14

## 2022-01-14 ENCOUNTER — OFFICE VISIT (OUTPATIENT)
Dept: URGENT CARE | Facility: CLINIC | Age: 27
End: 2022-01-14
Payer: COMMERCIAL

## 2022-01-14 VITALS
WEIGHT: 203 LBS | HEIGHT: 62 IN | TEMPERATURE: 97.8 F | SYSTOLIC BLOOD PRESSURE: 118 MMHG | DIASTOLIC BLOOD PRESSURE: 78 MMHG | HEART RATE: 71 BPM | OXYGEN SATURATION: 100 % | BODY MASS INDEX: 37.36 KG/M2 | RESPIRATION RATE: 20 BRPM

## 2022-01-14 DIAGNOSIS — B34.9 NONSPECIFIC SYNDROME SUGGESTIVE OF VIRAL ILLNESS: ICD-10-CM

## 2022-01-14 DIAGNOSIS — U07.1 COVID-19 VIRUS INFECTION: ICD-10-CM

## 2022-01-14 LAB
EXTERNAL QUALITY CONTROL: NORMAL
SARS-COV+SARS-COV-2 AG RESP QL IA.RAPID: POSITIVE

## 2022-01-14 PROCEDURE — 87426 SARSCOV CORONAVIRUS AG IA: CPT | Mod: QW | Performed by: STUDENT IN AN ORGANIZED HEALTH CARE EDUCATION/TRAINING PROGRAM

## 2022-01-14 PROCEDURE — 99213 OFFICE O/P EST LOW 20 MIN: CPT | Mod: CS | Performed by: STUDENT IN AN ORGANIZED HEALTH CARE EDUCATION/TRAINING PROGRAM

## 2022-01-14 ASSESSMENT — FIBROSIS 4 INDEX: FIB4 SCORE: 0.29

## 2022-01-14 NOTE — LETTER
[facility]  [address] [facility] [address]  [place] [address]     January 14, 2022    Patient: Chelsea Carrillo   YOB: 1995   Date of Visit: 1/14/2022       To Whom It May Concern:    Chelsea Carrillo was seen and treated in our department on 1/14/2022. COVID-19 PCR test is positive.     Please follow current CDC guideline for further instructions on duration of self-isolation and return to work protocol.     Sincerely,     Kiara Dietz M.D.

## 2022-01-15 NOTE — PROGRESS NOTES
"Kim Carrillo is a 26 y.o. female who presents with Runny Nose, Sinusitis, Head Ache, and Pharyngitis    Patient reported 3 days of fever,, chills , body aches, congestion, running nose , sinus pressure, wet cough with clear phlem, HA, mild sore throat, scratchy,   No n/v/d, chest pain, sob, abd pain    Recent exposure to covid-19 at work  2 shots of covid-19 vacc, flu vacc             Objective     /78 (BP Location: Right arm, Patient Position: Sitting, BP Cuff Size: Adult)   Pulse 71   Temp 36.6 °C (97.8 °F) (Temporal)   Resp 20   Ht 1.575 m (5' 2\")   Wt 92.1 kg (203 lb)   LMP 10/27/2021 (Within Weeks)   SpO2 100%   BMI 37.13 kg/m²      Physical Exam  Constitutional:       Appearance: Normal appearance.   HENT:      Head: Normocephalic.      Nose: Congestion present.      Mouth/Throat:      Mouth: Mucous membranes are moist.      Pharynx: Posterior oropharyngeal erythema present. No oropharyngeal exudate.   Eyes:      Extraocular Movements: Extraocular movements intact.   Cardiovascular:      Rate and Rhythm: Normal rate and regular rhythm.      Pulses: Normal pulses.      Heart sounds: Normal heart sounds.   Pulmonary:      Effort: Pulmonary effort is normal. No respiratory distress.      Breath sounds: Normal breath sounds. No wheezing or rales.   Abdominal:      Palpations: Abdomen is soft.   Musculoskeletal:         General: Normal range of motion.      Cervical back: Normal range of motion. Tenderness present.   Lymphadenopathy:      Cervical: Cervical adenopathy present.   Skin:     General: Skin is warm.   Neurological:      Mental Status: She is alert. Mental status is at baseline.                             Assessment & Plan           1.COVID-19 virus infection    Positive - POCT SARS-COV Antigen AL (Symptomatic Only)    Please follow current CDC guideline for further instructions on duration of self-isolation and return to work protocol.     Recommended supportive care " measures, including rest, increasing oral fluid intake and use of over-the-counter medications for relief of symptoms.    Differential diagnosis, natural history, supportive care, and indications for immediate follow-up discussed.   Advised to follow-up with primary care provider for further management  Go to the local Emergency Department, or urgent care, if any worsening condition or symptoms are not improving

## 2022-01-15 NOTE — PATIENT INSTRUCTIONS
COVID-19 Frequently Asked Questions  COVID-19 (coronavirus disease) is an infection that is caused by a large family of viruses. Some viruses cause illness in people and others cause illness in animals like camels, cats, and bats. In some cases, the viruses that cause illness in animals can spread to humans.  Where did the coronavirus come from?  In December 2019, [place] told the World Health Organization (WHO) of several cases of lung disease (human respiratory illness). These cases were linked to an open seafood and livestock market in the city of [facility]. The link to the seafood and livestock market suggests that the virus may have spread from animals to humans. However, since that first outbreak in December, the virus has also been shown to spread from person to person.  What is the name of the disease and the virus?  Disease name  Early on, this disease was called novel coronavirus. This is because scientists determined that the disease was caused by a new (novel) respiratory virus. The World Health Organization (WHO) has now named the disease COVID-19, or coronavirus disease.  Virus name  The virus that causes the disease is called severe acute respiratory syndrome coronavirus 2 (SARS-CoV-2).  More information on disease and virus naming  World Health Organization (WHO): www.who.int/emergencies/diseases/novel-coronavirus-2019/technical-guidance/naming-the-coronavirus-disease-(covid-2019)-and-the-virus-that-causes-it  Who is at risk for complications from coronavirus disease?  Some people may be at higher risk for complications from coronavirus disease. This includes older adults and people who have chronic diseases, such as heart disease, diabetes, and lung disease.  If you are at higher risk for complications, take these extra precautions:  · Avoid close contact with people who are sick or have a fever or cough. Stay at least 3-6 ft (1-2 m) away from them, if possible.  · Wash your hands often with soap and  water for at least 20 seconds.  · Avoid touching your face, mouth, nose, or eyes.  · Keep supplies on hand at home, such as food, medicine, and cleaning supplies.  · Stay home as much as possible.  · Avoid social gatherings and travel.  How does coronavirus disease spread?  The virus that causes coronavirus disease spreads easily from person to person (is contagious). There are also cases of community-spread disease. This means the disease has spread to:  · People who have no known contact with other infected people.  · People who have not traveled to areas where there are known cases.  It appears to spread from one person to another through droplets from coughing or sneezing.  Can I get the virus from touching surfaces or objects?  There is still a lot that we do not know about the virus that causes coronavirus disease. Scientists are basing a lot of information on what they know about similar viruses, such as:  · Viruses cannot generally survive on surfaces for long. They need a human body (host) to survive.  · It is more likely that the virus is spread by close contact with people who are sick (direct contact), such as through:  ? Shaking hands or hugging.  ? Breathing in respiratory droplets that travel through the air. This can happen when an infected person coughs or sneezes on or near other people.  · It is less likely that the virus is spread when a person touches a surface or object that has the virus on it (indirect contact). The virus may be able to enter the body if the person touches a surface or object and then touches his or her face, eyes, nose, or mouth.  Can a person spread the virus without having symptoms of the disease?  It may be possible for the virus to spread before a person has symptoms of the disease, but this is most likely not the main way the virus is spreading. It is more likely for the virus to spread by being in close contact with people who are sick and breathing in the respiratory  droplets of a sick person's cough or sneeze.  What are the symptoms of coronavirus disease?  Symptoms vary from person to person and can range from mild to severe. Symptoms may include:  · Fever.  · Cough.  · Tiredness, weakness, or fatigue.  · Fast breathing or feeling short of breath.  These symptoms can appear anywhere from 2 to 14 days after you have been exposed to the virus. If you develop symptoms, call your health care provider. People with severe symptoms may need hospital care.  If I am exposed to the virus, how long does it take before symptoms start?  Symptoms of coronavirus disease may appear anywhere from 2 to 14 days after a person has been exposed to the virus. If you develop symptoms, call your health care provider.  Should I be tested for this virus?  Your health care provider will decide whether to test you based on your symptoms, history of exposure, and your risk factors.  How does a health care provider test for this virus?  Health care providers will collect samples to send for testing. Samples may include:  · Taking a swab of fluid from the nose.  · Taking fluid from the lungs by having you cough up mucus (sputum) into a sterile cup.  · Taking a blood sample.  · Taking a stool or urine sample.  Is there a treatment or vaccine for this virus?  Currently, there is no vaccine to prevent coronavirus disease. Also, there are no medicines like antibiotics or antivirals to treat the virus. A person who becomes sick is given supportive care, which means rest and fluids. A person may also relieve his or her symptoms by using over-the-counter medicines that treat sneezing, coughing, and runny nose. These are the same medicines that a person takes for the common cold.  If you develop symptoms, call your health care provider. People with severe symptoms may need hospital care.  What can I do to protect myself and my family from this virus?         You can protect yourself and your family by taking the  same actions that you would take to prevent the spread of other viruses. Take the following actions:  · Wash your hands often with soap and water for at least 20 seconds. If soap and water are not available, use alcohol-based hand .  · Avoid touching your face, mouth, nose, or eyes.  · Cough or sneeze into a tissue, sleeve, or elbow. Do not cough or sneeze into your hand or the air.  ? If you cough or sneeze into a tissue, throw it away immediately and wash your hands.  · Disinfect objects and surfaces that you frequently touch every day.  · Avoid close contact with people who are sick or have a fever or cough. Stay at least 3-6 ft (1-2 m) away from them, if possible.  · Stay home if you are sick, except to get medical care. Call your health care provider before you get medical care.  · Make sure your vaccines are up to date. Ask your health care provider what vaccines you need.  What should I do if I need to travel?  Follow travel recommendations from your local health authority, the CDC, and WHO.  Travel information and advice  · Centers for Disease Control and Prevention (CDC): www.cdc.gov/coronavirus/2019-ncov/travelers/index.html  · World Health Organization (WHO): www.who.int/emergencies/diseases/novel-coronavirus-2019/travel-advice  Know the risks and take action to protect your health  · You are at higher risk of getting coronavirus disease if you are traveling to areas with an outbreak or if you are exposed to travelers from areas with an outbreak.  · Wash your hands often and practice good hygiene to lower the risk of catching or spreading the virus.  What should I do if I am sick?  General instructions to stop the spread of infection  · Wash your hands often with soap and water for at least 20 seconds. If soap and water are not available, use alcohol-based hand .  · Cough or sneeze into a tissue, sleeve, or elbow. Do not cough or sneeze into your hand or the air.  · If you cough or  sneeze into a tissue, throw it away immediately and wash your hands.  · Stay home unless you must get medical care. Call your health care provider or local health authority before you get medical care.  · Avoid public areas. Do not take public transportation, if possible.  · If you can, wear a mask if you must go out of the house or if you are in close contact with someone who is not sick.  Keep your home clean  · Disinfect objects and surfaces that are frequently touched every day. This may include:  ? Counters and tables.  ? Doorknobs and light switches.  ? Sinks and faucets.  ? Electronics such as phones, remote controls, keyboards, computers, and tablets.  · Wash dishes in hot, soapy water or use a . Air-dry your dishes.  · Wash laundry in hot water.  Prevent infecting other household members  · Let healthy household members care for children and pets, if possible. If you have to care for children or pets, wash your hands often and wear a mask.  · Sleep in a different bedroom or bed, if possible.  · Do not share personal items, such as razors, toothbrushes, deodorant, hdz, brushes, towels, and washcloths.  Where to find more information  Centers for Disease Control and Prevention (CDC)  · Information and news updates: www.cdc.gov/coronavirus/2019-ncov  World Health Organization (WHO)  · Information and news updates: www.who.int/emergencies/diseases/novel-coronavirus-2019  · Coronavirus health topic: www.who.int/health-topics/coronavirus  · Questions and answers on COVID-19: www.who.int/news-room/q-a-detail/i-l-ocbiqqxliejme  · Global tracker: who.[employer]  American Academy of Pediatrics (AAP)  · Information for families: www.healthychildren.org/English/health-issues/conditions/chest-lungs/Pages/2019-Novel-Coronavirus.aspx  The coronavirus situation is changing rapidly. Check your local health authority website or the CDC and WHO websites for updates and news.  When should I contact a health care  provider?  · Contact your health care provider if you have symptoms of an infection, such as fever or cough, and you:  ? Have been near anyone who is known to have coronavirus disease.  ? Have come into contact with a person who is suspected to have coronavirus disease.  ? Have traveled outside of the country.  When should I get emergency medical care?  · Get help right away by calling your local emergency services (911 in the [place] if you have:  ? Trouble breathing.  ? Pain or pressure in your chest.  ? Confusion.  ? Blue-tinged lips and fingernails.  ? Difficulty waking from sleep.  ? Symptoms that get worse.  Let the emergency medical personnel know if you think you have coronavirus disease.  Summary  · A new respiratory virus is spreading from person to person and causing COVID-19 (coronavirus disease).  · The virus that causes COVID-19 appears to spread easily. It spreads from one person to another through droplets from coughing or sneezing.  · Older adults and those with chronic diseases are at higher risk of disease. If you are at higher risk for complications, take extra precautions.  · There is currently no vaccine to prevent coronavirus disease. There are no medicines, such as antibiotics or antivirals, to treat the virus.  · You can protect yourself and your family by washing your hands often, avoiding touching your face, and covering your coughs and sneezes.  This information is not intended to replace advice given to you by your health care provider. Make sure you discuss any questions you have with your health care provider.  Document Released: 04/14/2020 Document Revised: 04/14/2020 Document Reviewed: 04/14/2020  Elsevier Patient Education © 2020 [employer]

## 2022-01-21 ENCOUNTER — HOSPITAL ENCOUNTER (OUTPATIENT)
Dept: LAB | Facility: MEDICAL CENTER | Age: 27
End: 2022-01-21
Attending: OBSTETRICS & GYNECOLOGY
Payer: COMMERCIAL

## 2022-01-21 LAB — B-HCG SERPL-ACNC: 1 MIU/ML (ref 0–10)

## 2022-01-21 PROCEDURE — 84702 CHORIONIC GONADOTROPIN TEST: CPT

## 2022-01-21 PROCEDURE — 36415 COLL VENOUS BLD VENIPUNCTURE: CPT

## 2022-03-21 ENCOUNTER — OFFICE VISIT (OUTPATIENT)
Dept: URGENT CARE | Facility: PHYSICIAN GROUP | Age: 27
End: 2022-03-21
Payer: COMMERCIAL

## 2022-03-21 VITALS
RESPIRATION RATE: 18 BRPM | BODY MASS INDEX: 37.17 KG/M2 | HEIGHT: 62 IN | DIASTOLIC BLOOD PRESSURE: 78 MMHG | SYSTOLIC BLOOD PRESSURE: 122 MMHG | OXYGEN SATURATION: 97 % | HEART RATE: 111 BPM | WEIGHT: 202 LBS | TEMPERATURE: 98.2 F

## 2022-03-21 DIAGNOSIS — R05.9 COUGH: ICD-10-CM

## 2022-03-21 DIAGNOSIS — J02.9 SORE THROAT: ICD-10-CM

## 2022-03-21 DIAGNOSIS — J06.9 UPPER RESPIRATORY INFECTION, ACUTE: ICD-10-CM

## 2022-03-21 PROCEDURE — 99213 OFFICE O/P EST LOW 20 MIN: CPT | Performed by: PHYSICIAN ASSISTANT

## 2022-03-21 RX ORDER — PREDNISONE 50 MG/1
50 TABLET ORAL DAILY
Qty: 5 TABLET | Refills: 0 | Status: SHIPPED | OUTPATIENT
Start: 2022-03-21 | End: 2022-03-26

## 2022-03-21 RX ORDER — DEXTROMETHORPHAN HYDROBROMIDE AND PROMETHAZINE HYDROCHLORIDE 15; 6.25 MG/5ML; MG/5ML
5 SYRUP ORAL EVERY 4 HOURS PRN
Qty: 118 ML | Refills: 0 | Status: SHIPPED | OUTPATIENT
Start: 2022-03-21 | End: 2022-09-15

## 2022-03-21 ASSESSMENT — FIBROSIS 4 INDEX: FIB4 SCORE: 0.3

## 2022-03-21 NOTE — PROGRESS NOTES
"Subjective:   hCelsea Carrillo is a 27 y.o. female who presents for Cough (Chest pain,sore throat,x1 week)     7 days.  No fevers or chills.  SOB when coughing only. Cough is dry.   Using albuterol and advair regularly for asthma since childhood.  Vaccinated.  Covid in January.  Recovered but did have ongoing extreme fatigue.  Cough did resolve.  Recent cough began 7 days ago.  Having extreme difficulty with sleeping or going about normal activities of daily living due to cough.  Chest become sore due to progressive coughing.  Mild sore throat related to coughing.  No fever or chills.  No other constitutional symptoms.  Does not feel like asthma exacerbation.      HPI      ROS    Medications:  albuterol Aers  betamethasone dipropionate Crea  Doxylamine-Pyridoxine Tbec  EPINEPHrine Soaj  fluticasone-salmeterol Aepb  sertraline Tabs    Allergies:             Penicillin g and Pcn [penicillins]    Surgical History:         Past Surgical History:   Procedure Laterality Date   • TONSILLECTOMY         Past Social Hx:  Chelsea Carrillo  reports that she quit smoking about 3 years ago. She smoked 0.00 packs per day for 5.00 years. She has never used smokeless tobacco. She reports previous alcohol use. She reports that she does not use drugs.     Past Family Hx:   Chelsea Carrillo family history includes Sleep Apnea in her father and mother.       Problem list, medications, and allergies reviewed by myself today in Epic.     Objective:     /78 (BP Location: Right arm, Patient Position: Sitting, BP Cuff Size: Adult)   Pulse (!) 111   Temp 36.8 °C (98.2 °F) (Temporal)   Resp 18   Ht 1.575 m (5' 2\")   Wt 91.6 kg (202 lb)   LMP 10/27/2021 (Within Weeks)   SpO2 97%   BMI 36.95 kg/m²     Physical Exam  Vitals and nursing note reviewed.   Constitutional:       General: She is not in acute distress.     Appearance: She is well-developed. She is not diaphoretic.   HENT:      Head: Normocephalic.      Right Ear: Tympanic " membrane, ear canal and external ear normal.      Left Ear: Tympanic membrane, ear canal and external ear normal.      Nose: Mucosal edema, congestion and rhinorrhea present.      Mouth/Throat:      Mouth: Mucous membranes are moist.      Pharynx: Posterior oropharyngeal erythema present.   Eyes:      General:         Right eye: No discharge.         Left eye: No discharge.      Conjunctiva/sclera: Conjunctivae normal.      Pupils: Pupils are equal, round, and reactive to light.   Cardiovascular:      Rate and Rhythm: Regular rhythm. Tachycardia present.   Pulmonary:      Effort: Pulmonary effort is normal. No accessory muscle usage or respiratory distress.      Breath sounds: Normal breath sounds. No stridor. No wheezing or rhonchi.   Abdominal:      General: Bowel sounds are normal. There is no distension.      Palpations: Abdomen is soft.      Tenderness: There is no abdominal tenderness. There is no guarding or rebound.   Musculoskeletal:         General: Normal range of motion.      Cervical back: Normal range of motion.   Lymphadenopathy:      Cervical: No cervical adenopathy.   Skin:     General: Skin is warm and dry.   Neurological:      Mental Status: She is alert and oriented to person, place, and time.         Assessment/Plan:     Diagnosis and Associated Orders:     1. Cough  - promethazine-dextromethorphan (PROMETHAZINE-DM) 6.25-15 MG/5ML syrup; Take 5 mL by mouth every four hours as needed for Cough.  Dispense: 118 mL; Refill: 0  - predniSONE (DELTASONE) 50 MG Tab; Take 1 Tablet by mouth every day for 5 days.  Dispense: 5 Tablet; Refill: 0    2. Upper respiratory infection, acute    3. Sore throat        Comments/MDM:  Patient seen and examined.  Vital signs reassuring although slightly tachycardic.  Has been afebrile.  Lungs are clear without evidence of wheezing.  Is using Advair regularly, less frequent use of albuterol.  Recommended every 4 hour usage of albuterol.  Antihistamine for postnasal  drip.  Promethazine DM for sleep at night and cough.  Will place on 5-day course of prednisone.  Return with fever, chest pain, progressive coughing or shortness of breath.  Follow-up with PCP.  Adverse effects of steroids discussed.      I personally reviewed prior external notes and test results pertinent to today's visit.  Red flags discussed as well as indications to present to the Emergency Department.  Supportive care, natural history, differential diagnoses, and indications for immediate follow-up discussed.  Patient expresses understanding and agrees to plan.  Patient denies any other questions or concerns.    Follow-up with the primary care physician for recheck, reevaluation, and consideration of further management.      Please note that this dictation was created using voice recognition software. I have made a reasonable attempt to correct obvious errors, but I expect that there are errors of grammar and possibly content that I did not discover before finalizing the note.    This note was electronically signed by Yaneth Rondon PA-C

## 2022-04-14 RX ORDER — ALBUTEROL SULFATE 90 UG/1
AEROSOL, METERED RESPIRATORY (INHALATION)
Qty: 8.5 G | Refills: 1 | Status: SHIPPED | OUTPATIENT
Start: 2022-04-14 | End: 2022-08-16

## 2022-04-14 NOTE — TELEPHONE ENCOUNTER
Received request via: Pharmacy    Was the patient seen in the last year in this department? Yes  LOV 12/14/2021  Does the patient have an active prescription (recently filled or refills available) for medication(s) requested? No

## 2022-08-16 RX ORDER — ALBUTEROL SULFATE 90 UG/1
AEROSOL, METERED RESPIRATORY (INHALATION)
Qty: 8.5 G | Refills: 1 | Status: SHIPPED | OUTPATIENT
Start: 2022-08-16 | End: 2022-12-19

## 2022-09-15 ENCOUNTER — OFFICE VISIT (OUTPATIENT)
Dept: MEDICAL GROUP | Facility: LAB | Age: 27
End: 2022-09-15
Payer: COMMERCIAL

## 2022-09-15 VITALS
WEIGHT: 194 LBS | HEART RATE: 72 BPM | HEIGHT: 62 IN | DIASTOLIC BLOOD PRESSURE: 70 MMHG | TEMPERATURE: 97 F | RESPIRATION RATE: 14 BRPM | BODY MASS INDEX: 35.7 KG/M2 | OXYGEN SATURATION: 97 % | SYSTOLIC BLOOD PRESSURE: 114 MMHG

## 2022-09-15 DIAGNOSIS — Z23 NEED FOR VACCINATION: ICD-10-CM

## 2022-09-15 DIAGNOSIS — F33.42 RECURRENT MAJOR DEPRESSIVE DISORDER, IN FULL REMISSION (HCC): ICD-10-CM

## 2022-09-15 DIAGNOSIS — F41.1 GAD (GENERALIZED ANXIETY DISORDER): ICD-10-CM

## 2022-09-15 PROCEDURE — 90686 IIV4 VACC NO PRSV 0.5 ML IM: CPT | Performed by: FAMILY MEDICINE

## 2022-09-15 PROCEDURE — 90715 TDAP VACCINE 7 YRS/> IM: CPT | Performed by: FAMILY MEDICINE

## 2022-09-15 PROCEDURE — 90472 IMMUNIZATION ADMIN EACH ADD: CPT | Performed by: FAMILY MEDICINE

## 2022-09-15 PROCEDURE — 90651 9VHPV VACCINE 2/3 DOSE IM: CPT | Performed by: FAMILY MEDICINE

## 2022-09-15 PROCEDURE — 90677 PCV20 VACCINE IM: CPT | Performed by: FAMILY MEDICINE

## 2022-09-15 PROCEDURE — 90471 IMMUNIZATION ADMIN: CPT | Performed by: FAMILY MEDICINE

## 2022-09-15 PROCEDURE — 99214 OFFICE O/P EST MOD 30 MIN: CPT | Mod: 25 | Performed by: FAMILY MEDICINE

## 2022-09-15 RX ORDER — FLUTICASONE PROPIONATE AND SALMETEROL 100; 50 UG/1; UG/1
POWDER RESPIRATORY (INHALATION)
COMMUNITY
End: 2023-01-27

## 2022-09-15 RX ORDER — NORETHINDRONE ACETATE AND ETHINYL ESTRADIOL .02; 1 MG/1; MG/1
TABLET ORAL
COMMUNITY
Start: 2022-03-01 | End: 2022-09-15

## 2022-09-15 ASSESSMENT — FIBROSIS 4 INDEX: FIB4 SCORE: 0.3

## 2022-09-27 ENCOUNTER — OFFICE VISIT (OUTPATIENT)
Dept: URGENT CARE | Facility: PHYSICIAN GROUP | Age: 27
End: 2022-09-27
Payer: COMMERCIAL

## 2022-09-27 VITALS
DIASTOLIC BLOOD PRESSURE: 70 MMHG | HEIGHT: 62 IN | SYSTOLIC BLOOD PRESSURE: 108 MMHG | TEMPERATURE: 97.4 F | WEIGHT: 194 LBS | RESPIRATION RATE: 16 BRPM | OXYGEN SATURATION: 94 % | BODY MASS INDEX: 35.7 KG/M2 | HEART RATE: 68 BPM

## 2022-09-27 DIAGNOSIS — L03.114 CELLULITIS OF LEFT UPPER EXTREMITY: ICD-10-CM

## 2022-09-27 PROCEDURE — 99213 OFFICE O/P EST LOW 20 MIN: CPT | Performed by: NURSE PRACTITIONER

## 2022-09-27 RX ORDER — CEPHALEXIN 500 MG/1
500 CAPSULE ORAL 4 TIMES DAILY
Qty: 20 CAPSULE | Refills: 0 | Status: SHIPPED | OUTPATIENT
Start: 2022-09-27 | End: 2022-10-02

## 2022-09-27 ASSESSMENT — FIBROSIS 4 INDEX: FIB4 SCORE: 0.3

## 2022-10-02 ASSESSMENT — ENCOUNTER SYMPTOMS
NUMBNESS: 0
MYALGIAS: 1
FEVER: 0

## 2022-10-30 DIAGNOSIS — J45.20 MILD INTERMITTENT ASTHMA WITHOUT COMPLICATION: ICD-10-CM

## 2022-10-31 NOTE — TELEPHONE ENCOUNTER
Received request via: Pharmacy  9/15/2022lov  Was the patient seen in the last year in this department? Yes    Does the patient have an active prescription (recently filled or refills available) for medication(s) requested? No

## 2022-11-01 RX ORDER — FLUTICASONE PROPIONATE AND SALMETEROL 250; 50 UG/1; UG/1
POWDER RESPIRATORY (INHALATION)
Qty: 60 EACH | Refills: 1 | Status: SHIPPED | OUTPATIENT
Start: 2022-11-01 | End: 2023-01-19

## 2022-12-02 ENCOUNTER — APPOINTMENT (OUTPATIENT)
Dept: RADIOLOGY | Facility: MEDICAL CENTER | Age: 27
End: 2022-12-02
Attending: EMERGENCY MEDICINE
Payer: COMMERCIAL

## 2022-12-02 ENCOUNTER — HOSPITAL ENCOUNTER (EMERGENCY)
Facility: MEDICAL CENTER | Age: 27
End: 2022-12-02
Attending: EMERGENCY MEDICINE
Payer: COMMERCIAL

## 2022-12-02 VITALS
OXYGEN SATURATION: 97 % | HEART RATE: 82 BPM | BODY MASS INDEX: 34.52 KG/M2 | RESPIRATION RATE: 16 BRPM | HEIGHT: 62 IN | TEMPERATURE: 97.6 F | WEIGHT: 187.61 LBS | DIASTOLIC BLOOD PRESSURE: 74 MMHG | SYSTOLIC BLOOD PRESSURE: 124 MMHG

## 2022-12-02 DIAGNOSIS — R11.2 NAUSEA AND VOMITING, UNSPECIFIED VOMITING TYPE: ICD-10-CM

## 2022-12-02 DIAGNOSIS — E86.0 DEHYDRATION: ICD-10-CM

## 2022-12-02 DIAGNOSIS — R55 VASOVAGAL SYNCOPE: ICD-10-CM

## 2022-12-02 DIAGNOSIS — I88.0 MESENTERIC ADENITIS: ICD-10-CM

## 2022-12-02 LAB
ALBUMIN SERPL BCP-MCNC: 4.9 G/DL (ref 3.2–4.9)
ALBUMIN/GLOB SERPL: 1.3 G/DL
ALP SERPL-CCNC: 114 U/L (ref 30–99)
ALT SERPL-CCNC: 23 U/L (ref 2–50)
ANION GAP SERPL CALC-SCNC: 16 MMOL/L (ref 7–16)
APPEARANCE UR: ABNORMAL
AST SERPL-CCNC: 19 U/L (ref 12–45)
BACTERIA #/AREA URNS HPF: ABNORMAL /HPF
BASOPHILS # BLD AUTO: 0.3 % (ref 0–1.8)
BASOPHILS # BLD: 0.05 K/UL (ref 0–0.12)
BILIRUB SERPL-MCNC: 0.6 MG/DL (ref 0.1–1.5)
BILIRUB UR QL STRIP.AUTO: ABNORMAL
BUN SERPL-MCNC: 14 MG/DL (ref 8–22)
CALCIUM SERPL-MCNC: 9.8 MG/DL (ref 8.4–10.2)
CHLORIDE SERPL-SCNC: 99 MMOL/L (ref 96–112)
CO2 SERPL-SCNC: 24 MMOL/L (ref 20–33)
COLOR UR: YELLOW
CREAT SERPL-MCNC: 0.68 MG/DL (ref 0.5–1.4)
EOSINOPHIL # BLD AUTO: 0.09 K/UL (ref 0–0.51)
EOSINOPHIL NFR BLD: 0.5 % (ref 0–6.9)
EPI CELLS #/AREA URNS HPF: ABNORMAL /HPF
ERYTHROCYTE [DISTWIDTH] IN BLOOD BY AUTOMATED COUNT: 42.3 FL (ref 35.9–50)
GFR SERPLBLD CREATININE-BSD FMLA CKD-EPI: 122 ML/MIN/1.73 M 2
GLOBULIN SER CALC-MCNC: 3.9 G/DL (ref 1.9–3.5)
GLUCOSE SERPL-MCNC: 83 MG/DL (ref 65–99)
GLUCOSE UR STRIP.AUTO-MCNC: NEGATIVE MG/DL
GRAN CASTS #/AREA URNS LPF: ABNORMAL /LPF
HCG SERPL QL: NEGATIVE
HCT VFR BLD AUTO: 47.1 % (ref 37–47)
HGB BLD-MCNC: 16 G/DL (ref 12–16)
HYALINE CASTS #/AREA URNS LPF: ABNORMAL /LPF
IMM GRANULOCYTES # BLD AUTO: 0.07 K/UL (ref 0–0.11)
IMM GRANULOCYTES NFR BLD AUTO: 0.4 % (ref 0–0.9)
KETONES UR STRIP.AUTO-MCNC: >=80 MG/DL
LEUKOCYTE ESTERASE UR QL STRIP.AUTO: NEGATIVE
LIPASE SERPL-CCNC: 18 U/L (ref 7–58)
LYMPHOCYTES # BLD AUTO: 2.15 K/UL (ref 1–4.8)
LYMPHOCYTES NFR BLD: 12.6 % (ref 22–41)
MCH RBC QN AUTO: 28.6 PG (ref 27–33)
MCHC RBC AUTO-ENTMCNC: 34 G/DL (ref 33.6–35)
MCV RBC AUTO: 84.3 FL (ref 81.4–97.8)
MICRO URNS: ABNORMAL
MONOCYTES # BLD AUTO: 0.75 K/UL (ref 0–0.85)
MONOCYTES NFR BLD AUTO: 4.4 % (ref 0–13.4)
MUCOUS THREADS #/AREA URNS HPF: ABNORMAL /HPF
NEUTROPHILS # BLD AUTO: 13.9 K/UL (ref 2–7.15)
NEUTROPHILS NFR BLD: 81.8 % (ref 44–72)
NITRITE UR QL STRIP.AUTO: NEGATIVE
NRBC # BLD AUTO: 0 K/UL
NRBC BLD-RTO: 0 /100 WBC
PH UR STRIP.AUTO: 6 [PH] (ref 5–8)
PLATELET # BLD AUTO: 446 K/UL (ref 164–446)
PMV BLD AUTO: 8.2 FL (ref 9–12.9)
POTASSIUM SERPL-SCNC: 4.1 MMOL/L (ref 3.6–5.5)
PROT SERPL-MCNC: 8.8 G/DL (ref 6–8.2)
PROT UR QL STRIP: 100 MG/DL
RBC # BLD AUTO: 5.59 M/UL (ref 4.2–5.4)
RBC # URNS HPF: ABNORMAL /HPF
RBC UR QL AUTO: ABNORMAL
SODIUM SERPL-SCNC: 139 MMOL/L (ref 135–145)
SP GR UR STRIP.AUTO: >=1.03
WBC # BLD AUTO: 17 K/UL (ref 4.8–10.8)
WBC #/AREA URNS HPF: ABNORMAL /HPF

## 2022-12-02 PROCEDURE — 700105 HCHG RX REV CODE 258: Performed by: EMERGENCY MEDICINE

## 2022-12-02 PROCEDURE — 81001 URINALYSIS AUTO W/SCOPE: CPT

## 2022-12-02 PROCEDURE — 36415 COLL VENOUS BLD VENIPUNCTURE: CPT

## 2022-12-02 PROCEDURE — 700117 HCHG RX CONTRAST REV CODE 255: Performed by: EMERGENCY MEDICINE

## 2022-12-02 PROCEDURE — 74177 CT ABD & PELVIS W/CONTRAST: CPT

## 2022-12-02 PROCEDURE — 83690 ASSAY OF LIPASE: CPT

## 2022-12-02 PROCEDURE — 84703 CHORIONIC GONADOTROPIN ASSAY: CPT

## 2022-12-02 PROCEDURE — 96374 THER/PROPH/DIAG INJ IV PUSH: CPT

## 2022-12-02 PROCEDURE — 99285 EMERGENCY DEPT VISIT HI MDM: CPT

## 2022-12-02 PROCEDURE — 700111 HCHG RX REV CODE 636 W/ 250 OVERRIDE (IP): Performed by: EMERGENCY MEDICINE

## 2022-12-02 PROCEDURE — 80053 COMPREHEN METABOLIC PANEL: CPT

## 2022-12-02 PROCEDURE — 85025 COMPLETE CBC W/AUTO DIFF WBC: CPT

## 2022-12-02 RX ORDER — SODIUM CHLORIDE 9 MG/ML
1000 INJECTION, SOLUTION INTRAVENOUS ONCE
Status: COMPLETED | OUTPATIENT
Start: 2022-12-02 | End: 2022-12-02

## 2022-12-02 RX ORDER — ONDANSETRON 4 MG/1
4 TABLET, ORALLY DISINTEGRATING ORAL EVERY 6 HOURS PRN
Qty: 16 TABLET | Refills: 0 | Status: SHIPPED | OUTPATIENT
Start: 2022-12-02 | End: 2022-12-06

## 2022-12-02 RX ORDER — ONDANSETRON 2 MG/ML
4 INJECTION INTRAMUSCULAR; INTRAVENOUS ONCE
Status: COMPLETED | OUTPATIENT
Start: 2022-12-02 | End: 2022-12-02

## 2022-12-02 RX ADMIN — ONDANSETRON 4 MG: 2 INJECTION INTRAMUSCULAR; INTRAVENOUS at 21:15

## 2022-12-02 RX ADMIN — SODIUM CHLORIDE 1000 ML: 9 INJECTION, SOLUTION INTRAVENOUS at 21:23

## 2022-12-02 RX ADMIN — IOHEXOL 100 ML: 350 INJECTION, SOLUTION INTRAVENOUS at 21:49

## 2022-12-02 ASSESSMENT — ENCOUNTER SYMPTOMS
FEVER: 0
ABDOMINAL PAIN: 1
SORE THROAT: 0
NAUSEA: 1
DIARRHEA: 1
COUGH: 0
VOMITING: 1

## 2022-12-02 ASSESSMENT — FIBROSIS 4 INDEX: FIB4 SCORE: 0.3

## 2022-12-03 NOTE — ED PROVIDER NOTES
ED Provider Note    Scribed for DEVIKA Liao II* by Shalonda Brooks. 12/2/2022  8:32 PM    Means of Arrival: Walk In  History obtained by: Patient  Limitations: None    CHIEF COMPLAINT  Chief Complaint   Patient presents with    Nausea/Vomiting/Diarrhea     Onset Wednesday  Emesis 10 / 24 hrs  BM x 6 / 24 hrs  liquid stool No blood    Abdominal Pain     Mid abd Onset Thursday No fever    Dizziness       HPI  Chelsea Carrillo is a 27 y.o. female who presents to the Emergency Department for persistent vomiting and diarrhea onset two days ago. Patient reports the diarrhea began first and she has had multiple episodes of liquid stool every day since onset. Vomiting began yesterday morning, and she has been unable to keep any food or liquid down. She has an associated symptom of generalized abdominal cramping. She has not taken any nausea medications and has not had antibiotics recently. Denies fevers, sore throat, congestion, or cough. Patient took a COVID and pregnancy test yesterday and both were negative.       REVIEW OF SYSTEMS  Review of Systems   Constitutional:  Negative for fever.   HENT:  Negative for congestion and sore throat.    Respiratory:  Negative for cough.    Gastrointestinal:  Positive for abdominal pain, diarrhea, nausea and vomiting.   All other systems reviewed and are negative.  See HPI for further details.    PAST MEDICAL HISTORY   has a past medical history of Anxiety (2015), Asthma, Bronchitis, Depression (2015), Heart murmur, Influenza, Nasal drainage, and Pneumonia.    SOCIAL HISTORY  Social History     Tobacco Use    Smoking status: Former     Packs/day: 0.00     Years: 5.00     Pack years: 0.00     Types: Cigarettes     Quit date: 2019     Years since quitting: 3.9    Smokeless tobacco: Never   Vaping Use    Vaping Use: Never used   Substance and Sexual Activity    Alcohol use: Not Currently    Drug use: Never    Sexual activity: Not on file       SURGICAL HISTORY   has a past  "surgical history that includes tonsillectomy.    CURRENT MEDICATIONS  Current Outpatient Medications   Medication Instructions    albuterol 108 (90 Base) MCG/ACT Aero Soln inhalation aerosol INHALE ONE TO TWO PUFFS BY MOUTH EVERY 6 HOURS AS NEEDED FOR SHORTNESS OF BREATH    EPINEPHrine (EPIPEN) 0.3 MG/0.3ML Solution Auto-injector solution for injection INJECT INTO THE MIDDLE OF THE OUTER THIGH, SHOULDER, OR BUTTOCKS AND HOLD FOR 3 SECONDS AS NEEDED FOR SEVERE ALLERGIC REACTION FOR ONE DOSE, THEN CALL 911 IF USED.    fluticasone-salmeterol (ADVAIR) 100-50 MCG/ACT AEROSOL POWDER, BREATH ACTIVATED ADVAIR DISKUS 250-50 MCG/DOSE AEPB    fluticasone-salmeterol (ADVAIR) 250-50 MCG/ACT AEROSOL POWDER, BREATH ACTIVATED INHALE ONE PUFF BY MOUTH TWICE A DAY    sertraline (ZOLOFT) 75 mg, Oral, DAILY, 1.5 tabs daily          ALLERGIES  Allergies   Allergen Reactions    Penicillin G      Other reaction(s): unknown- reaction happened when she was a little girl    Tree Nuts Food Allergy Anaphylaxis, Anxiety, Hives, Itching, Shortness of Breath and Swelling    Pcn [Penicillins]      hives       PHYSICAL EXAM  VITAL SIGNS: /83   Pulse 95   Temp 36.5 °C (97.7 °F) (Temporal)   Resp 16   Ht 1.575 m (5' 2\")   Wt 85.1 kg (187 lb 9.8 oz)   LMP 11/05/2022 (Exact Date)   SpO2 94%   Breastfeeding No   BMI 34.31 kg/m²     Pulse ox interpretation: I interpret this pulse ox as normal.  Constitutional: Alert in no apparent distress. Well appearing female.  HENT: No signs of trauma, Bilateral external ears normal, Nose normal.   Eyes: Pupils are equal, Conjunctiva normal, Non-icteric.   Neck: Normal range of motion, No tenderness, Supple, No stridor.   Cardiovascular: Regular rate and rhythm, no murmurs. Symmetric distal pulses. No cyanosis of extremities. No peripheral edema of extremities.  Thorax & Lungs: Normal breath sounds, No respiratory distress, No wheezing, No chest tenderness.   Abdomen: Discomfort with palpation. No " guarding. Bowel sounds normal, Soft, No masses, No pulsatile masses. No peritoneal signs.  Skin: Warm, Dry, No erythema, No rash.   Back: No midline bony tenderness, No CVA tenderness.   Musculoskeletal: Good range of motion in all major joints. No tenderness to palpation or major deformities noted.   Neurologic: Alert , Normal motor function, Normal sensory function, No focal deficits noted.   Psychiatric: Affect normal, Judgment normal, Mood normal.     DIAGNOSTIC STUDIES / PROCEDURES    LABS  Pertinent Labs & Imaging studies reviewed. (See chart for details)    RADIOLOGY  CT-ABDOMEN-PELVIS WITH   Final Result         1.  Hepatomegaly   2.  Mildly enlarged mesenteric lymph nodes, appearance suggests mesenteric adenitis, consider other causes of adenopathy with additional workup as clinically appropriate.   3.  Enhancing right ovarian cyst, could represent involuting cyst.        Pertinent Labs & Imaging studies reviewed. (See chart for details)    COURSE & MEDICAL DECISION MAKING  Pertinent Labs & Imaging studies reviewed. (See chart for details)    8:32 PM - This is a 27 y.o. female who presents with vomiting and diarrhea. Symptoms are consistent with gastroenteritis, but differential diagnoses includes but is not limited to food borne illness, less likely appendicitis, biliary colic, pancreatitis. Ordered for Estimated GFR, CBC w/ differential, CMP, Lipase, HCG Qual, and UA w/ culture to evaluate. Patient will be treated with Zofran 4mg and NS Infusion 1,000mL for nausea and dehydration. IV fluids given for dehydration, acute vomiting, and acute diarrhea.      8:54 PM - Review of lab results reveals an elevated WBC of 17. Will reexamine the patient and consider CT scan.      9:05 PM - I reevaluated the patient at bedside and updated her on her elevated WBC. She has not been given her IV fluids or Zofran yet. Repeat abdominal exam: tenderness in the upper abdomen, not consistently in the right upper quadrant or  left upper quadrant. Will order CT scan to evaluate for possible colitis.     HYDRATION: Based on the patient's presentation of Acute Diarrhea, Acute Vomiting, and Dehydration the patient was given IV fluids.     10:21 PM  CT with mesenteric adenitis. Also possible hepatomegaly. Adenopathy likely due to mesenteric adenitis. Suspect viral infection. LFTs within acceptable limits. Will ask her to follow up with primary provider for recheck given CT findings. I have prescribed zofran.      The patient will return for worsening symptoms and is stable at the time of discharge. The patient verbalizes understanding and will comply. Guidance provided on appropriate use of medications.      FINAL IMPRESSION  1. Nausea and vomiting, unspecified vomiting type    2. Dehydration    3. Mesenteric adenitis    4. Vasovagal syncope          Shalonda ANDERSON (Scribe), am scribing for, and in the presence of, BETSY Liao II.    Electronically signed by: Shalonda Brooks (Scribe), 12/2/2022    IDexter II, M* personally performed the services described in this documentation, as scribed by Shalonda Brooks in my presence, and it is both accurate and complete.    The note accurately reflects work and decisions made by me.  Dexter Argueta II, M.D.  12/2/2022  10:24 PM

## 2022-12-03 NOTE — ED NOTES
"Patient had a syncopal episode - approximately 10 seconds - after blood was drawn by this tech.  Patient remembered passing out and became nauseated after she woke up.  Patient vomited clear emesis.  Patient reports to have, \"not eaten in 2 days.\"  Patient was helped into a wheelchair and wheeled back to lobby by this tech.   "

## 2022-12-07 ENCOUNTER — OFFICE VISIT (OUTPATIENT)
Dept: MEDICAL GROUP | Facility: LAB | Age: 27
End: 2022-12-07
Payer: COMMERCIAL

## 2022-12-07 VITALS
WEIGHT: 177.6 LBS | SYSTOLIC BLOOD PRESSURE: 116 MMHG | TEMPERATURE: 97.1 F | BODY MASS INDEX: 32.68 KG/M2 | HEART RATE: 88 BPM | HEIGHT: 62 IN | OXYGEN SATURATION: 96 % | DIASTOLIC BLOOD PRESSURE: 82 MMHG | RESPIRATION RATE: 12 BRPM

## 2022-12-07 DIAGNOSIS — I88.0 MESENTERIC ADENITIS: ICD-10-CM

## 2022-12-07 DIAGNOSIS — R19.7 ACUTE DIARRHEA: ICD-10-CM

## 2022-12-07 PROCEDURE — 99214 OFFICE O/P EST MOD 30 MIN: CPT | Performed by: FAMILY MEDICINE

## 2022-12-07 ASSESSMENT — FIBROSIS 4 INDEX: FIB4 SCORE: 0.23

## 2022-12-07 ASSESSMENT — PATIENT HEALTH QUESTIONNAIRE - PHQ9: CLINICAL INTERPRETATION OF PHQ2 SCORE: 0

## 2022-12-07 NOTE — PROGRESS NOTES
"Subjective:     CC: ER follow-up, nausea//vomiting, diarrhea    HPI:   Chelsea presents today with continued symptoms after recent ER visit.  She was seen in the ER 12/2 for nausea/vomiting/diarrhea and abdominal pain.  Labs showed significant leukocytosis with left shift.  Abdominal CT showed hepatomegaly, ovarian cyst, and mesenteric adenitis.    Symptoms have slightly improved but still continued.  She is having 1 episode of vomiting every morning and watery diarrhea about 3-4 times per day.  She is able to eat and drink and is pushing fluids and bland diet.  No hematemesis, no blood in stool, no melena.  Minimal abdominal pain.  No fevers. No travel, no antibiotics, no unfiltered    Medications, past medical history, allergies, and social history have been reviewed and updated.      Objective:       Exam:  /82 (BP Location: Left arm, Patient Position: Sitting, BP Cuff Size: Adult)   Pulse 88   Temp 36.2 °C (97.1 °F)   Resp 12   Ht 1.575 m (5' 2\")   Wt 80.6 kg (177 lb 9.6 oz)   SpO2 96%   BMI 32.48 kg/m²  Body mass index is 32.48 kg/m².    Constitutional: Alert. Well appearing. No distress.  Skin: Warm, dry, good turgor, no visible rashes.  Eye: Equal, round and reactive to light, conjunctiva clear, lids normal.  ENMT: Moist mucous membranes.   Respiratory: Normal effort.  Abdomen: Soft, positive bowel sounds.  Minimal upper abdominal tenderness without rebound or guarding.  Nondistended.  Neuro: Moves all four extremities. No facial droop.  Psych: Answers questions appropriately. Normal affect and mood.      Assessment & Plan:     27 y.o. female with the following -     1. Mesenteric adenitis  2. Acute diarrhea  She was seen in the ER 12/2 for nausea/vomiting/diarrhea and abdominal pain.  Labs showed significant leukocytosis with left shift.  Abdominal CT showed hepatomegaly, ovarian cyst, and mesenteric adenitis.  Symptoms mildly improved but she does have continued vomiting once per day with " watery diarrhea 3-4 times per day.  No fevers and minimal abdominal pain.  Still think this is likely mesenteric adenitis secondary to a viral infection.  However, if symptoms do not improve within the next 2 to 3 days recommend that she return for blood work and stool studies.  She does not currently have risk factors for bacterial or protozoal diarrhea and symptoms not severe enough to warrant empiric antibiotics.  Discussed reasons to seek care.  Continue Zofran, Imodium, fluids.  - CBC WITH DIFFERENTIAL; Future  - Comp Metabolic Panel; Future  - CULTURE STOOL; Future  - C Diff by PCR rflx Toxin; Future  - CRYPTO/GIARDIA RAPID ASSAY; Future      Please note that this note was created using voice recognition software.

## 2022-12-19 RX ORDER — ALBUTEROL SULFATE 90 UG/1
AEROSOL, METERED RESPIRATORY (INHALATION)
Qty: 8.5 G | Refills: 1 | Status: SHIPPED | OUTPATIENT
Start: 2022-12-19 | End: 2023-05-03

## 2022-12-19 NOTE — TELEPHONE ENCOUNTER
Received request via: Pharmacy    Was the patient seen in the last year in this department? Yes  12/7/22  Does the patient have an active prescription (recently filled or refills available) for medication(s) requested? No    Does the patient have shelter Plus and need 100 day supply (blood pressure, diabetes and cholesterol meds only)?

## 2023-01-27 ENCOUNTER — OFFICE VISIT (OUTPATIENT)
Dept: SLEEP MEDICINE | Facility: MEDICAL CENTER | Age: 28
End: 2023-01-27
Payer: COMMERCIAL

## 2023-01-27 VITALS
OXYGEN SATURATION: 95 % | HEIGHT: 62 IN | BODY MASS INDEX: 32.57 KG/M2 | WEIGHT: 177 LBS | DIASTOLIC BLOOD PRESSURE: 76 MMHG | RESPIRATION RATE: 16 BRPM | SYSTOLIC BLOOD PRESSURE: 114 MMHG | HEART RATE: 70 BPM

## 2023-01-27 DIAGNOSIS — G47.33 OSA (OBSTRUCTIVE SLEEP APNEA): ICD-10-CM

## 2023-01-27 DIAGNOSIS — G47.09 OTHER INSOMNIA: ICD-10-CM

## 2023-01-27 PROCEDURE — 99213 OFFICE O/P EST LOW 20 MIN: CPT | Performed by: NURSE PRACTITIONER

## 2023-01-27 ASSESSMENT — PATIENT HEALTH QUESTIONNAIRE - PHQ9
CLINICAL INTERPRETATION OF PHQ2 SCORE: 1
SUM OF ALL RESPONSES TO PHQ QUESTIONS 1-9: 3
5. POOR APPETITE OR OVEREATING: 0 - NOT AT ALL

## 2023-01-27 ASSESSMENT — FIBROSIS 4 INDEX: FIB4 SCORE: 0.24

## 2023-01-27 NOTE — PROGRESS NOTES
Chief Complaint   Patient presents with    Apnea     DANSIHA-last seen 9/7/21       HPI:      Ms. Carrillo is a 27 y/o female patient who is in today for annual DANISHA f/u. PMH includes asthma, chronic insomnia, former smoker, obesity, tonsillectomy, anxiety, depression, heart murmur.     Patient has a ResMed auto CPAP machine that was obtained over April 2020.  Compliance report from 12/28/22-1/26/23 was downloaded and reviewed with the patient which showed autoCPAP 5-15 cmH2O, 90% compliance, 6 hrs 26 min use, AHI of 1.6, no mask leak. She is tolerating the pressure and mask well. She goes to bed between 8:30-9 pm and wakes up between 5-6 am.  Sometimes on the weekends she might stay up till 12 AM.  She has gotten herself into a very good routine with her sleep hygiene and no longer struggles with falling or staying asleep.  Occasionally she might have some nights and may utilize trazodone if needed.  She follows up with psychology every couple of months and her PCP is now managing her depression medication.  She denies morning headache or snoring.  She does feel much better since being on CPAP therapy.  She tries to stay active by walking her dog.  She denies any new health problems or medications.    Sleep Study History:   PSG from 3/20/20 indicated moderate DANISHA with AHI 21/h and O2 tyson 90 %.  There were no significant associated oxygen desaturations.  CPAP titration was not performed due to COVID-19 precautions. She was started on autoCPAP 5-15 cmH2O.    ROS:    Constitutional: Denies fevers, Denies weight changes  Eyes: Denies changes in vision, no eye pain  Ears/Nose/Throat/Mouth: Denies nasal congestion or sore throat   Cardiovascular: Denies chest pain or palpitations   Respiratory: Denies shortness of breath , Denies cough  Gastrointestinal/Hepatic: Denies abdominal pain, nausea, vomiting,   Skin/Breast: Denies rash,   Neurological: Denies headache, confusion,   Psychiatric: denies mood disorder   Sleep: denies  snoring, morning headache      Past Medical History:   Diagnosis Date    Anxiety     Asthma     Bronchitis     Depression     Heart murmur     Influenza     Nasal drainage     Pneumonia        Past Surgical History:   Procedure Laterality Date    TONSILLECTOMY         Family History   Problem Relation Age of Onset    Sleep Apnea Mother     Sleep Apnea Father        Social History     Socioeconomic History    Marital status:      Spouse name: Not on file    Number of children: Not on file    Years of education: Not on file    Highest education level: Not on file   Occupational History    Not on file   Tobacco Use    Smoking status: Former     Packs/day: 0.00     Years: 5.00     Pack years: 0.00     Types: Cigarettes     Quit date:      Years since quittin.0    Smokeless tobacco: Never   Vaping Use    Vaping Use: Never used   Substance and Sexual Activity    Alcohol use: Not Currently    Drug use: Never    Sexual activity: Not on file   Other Topics Concern    Not on file   Social History Narrative    ** Merged History Encounter **         Works in [employer]     Social Determinants of Health     Financial Resource Strain: Not on file   Food Insecurity: Not on file   Transportation Needs: Not on file   Physical Activity: Not on file   Stress: Not on file   Social Connections: Not on file   Intimate Partner Violence: Not on file   Housing Stability: Not on file       Allergies as of 2023 - Reviewed 2023   Allergen Reaction Noted    Penicillin g      Tree nuts food allergy Anaphylaxis, Anxiety, Hives, Itching, Shortness of Breath, and Swelling 1996    Pcn [penicillins]  12/10/2020        Vitals:  Vitals:    23 0841   BP: 114/76   Pulse: 70   Resp: 16   SpO2: 95%       Current medications as of today   Current Outpatient Medications   Medication Sig Dispense Refill    sertraline (ZOLOFT) 50 MG Tab       fluticasone-salmeterol (ADVAIR) 250-50 MCG/ACT AEROSOL  POWDER, BREATH ACTIVATED INHALE ONE PUFF BY MOUTH TWICE A DAY 60 Each 1    albuterol 108 (90 Base) MCG/ACT Aero Soln inhalation aerosol INHALE ONE TO TWO PUFFS BY MOUTH EVERY 6 HOURS AS NEEDED FOR SHORTNESS OF BREATH 8.5 g 1    EPINEPHrine (EPIPEN) 0.3 MG/0.3ML Solution Auto-injector solution for injection INJECT INTO THE MIDDLE OF THE OUTER THIGH, SHOULDER, OR BUTTOCKS AND HOLD FOR 3 SECONDS AS NEEDED FOR SEVERE ALLERGIC REACTION FOR ONE DOSE, THEN CALL 911 IF USED. 2 Each 1     No current facility-administered medications for this visit.         Physical Exam: Limited by COVID-19 precautions.  Appearance: Well developed, well nourished, no acute distress  Eyes: PERRL, EOM intact, sclera white, conjunctiva moist  Ears: no lesions or deformities  Hearing: grossly intact  Nose: no lesions or deformities  Respiratory effort: no intercostal retractions or use of accessory muscles  Extremities: no cyanosis or edema  Abdomen: soft   Gait and Station: normal  Digits and nails: no clubbing, cyanosis, petechiae or nodes.  Cranial nerves: grossly intact  Skin: no visible rashes, lesions or ulcers noted  Orientation: Oriented to time, person and place  Mood and affect: mood and affect appropriate, normal interaction with examiner  Judgement: Intact    Assessment:  1. DANISHA (obstructive sleep apnea)  DME Mask and Supplies      2. Other insomnia        3. BMI 32.0-32.9,adult  Patient identified as having weight management issue.  Appropriate orders and counseling given.            Plan  Discussed the cardiovascular and neuropsychiatric risks of untreated DANISHA; including but not limited to: HTN, DM, MI, ASCVD, CVA, CHF, traffic accidents.     1. Compliance report from 12/28/22-1/26/23 was downloaded and reviewed with the patient which showed autoCPAP 5-15 cmH2O, 90% compliance, 6 hrs 26 min use, AHI of 1.6, no mask leak.  Patient is compliant and benefiting from autoCPAP therapy for management of DANISHA. Advised patient to continue to  use the autoCPAP every night for more than four hours for optimal health benefit.    *DME order ([facility]) for mask (small Airfit N30i mask or MOC) and supplies was placed today. Continue to clean mask and supplies weekly with soap and water, and change supplies per insurance guidelines.     2. Sleep hygiene discussed. Recommend keeping a set sleep/wake schedule. Logging enough hours of sleep. Limiting/Avoiding naps. No caffeine after noon and no heavy meals in the evening.   Chronic insomnia: stable.  Patient has seen Dr. Strauss.  Continue to f/u with psychology.  Very seldomly uses trazodone.  3.  Encouraged a healthy diet and exercise to help with weight loss and management.   If your BMI is 25-29.9 you are overweight. If your BMI is 30 or greater you are obese. To lose weight eat less, move more, or both. Any diet that reduces caloric intake can help with weight loss. Extra weight may reduce your lifespan. Avoid dramatic unsustainable dietary changes that result in the yo-yo effect (down then back up.)  Usually small modifications in diet and exercise are easier to stick with.  4. Follow up with appropriate healthcare providers for all other medical problems.  5. F/u in 1 year for DANISHA, sooner if needed.       EMELI Millard.      This dictation was created using voice recognition software. The accuracy of the dictation is limited to the abilities of the software. I expect there may be some errors of grammar and possibly content.

## 2023-03-05 ENCOUNTER — PATIENT MESSAGE (OUTPATIENT)
Dept: MEDICAL GROUP | Facility: LAB | Age: 28
End: 2023-03-05
Payer: COMMERCIAL

## 2023-03-05 DIAGNOSIS — F41.1 GAD (GENERALIZED ANXIETY DISORDER): ICD-10-CM

## 2023-03-07 RX ORDER — HYDROXYZINE HYDROCHLORIDE 25 MG/1
25-50 TABLET, FILM COATED ORAL EVERY 8 HOURS PRN
Qty: 30 TABLET | Refills: 1 | Status: SHIPPED | OUTPATIENT
Start: 2023-03-07 | End: 2023-11-20

## 2023-05-03 RX ORDER — ALBUTEROL SULFATE 90 UG/1
AEROSOL, METERED RESPIRATORY (INHALATION)
Qty: 8.5 G | Refills: 1 | Status: SHIPPED | OUTPATIENT
Start: 2023-05-03 | End: 2023-09-20

## 2023-05-03 NOTE — TELEPHONE ENCOUNTER
Received request via: Pharmacy    Was the patient seen in the last year in this department? Yes  LOV 12/07/2022  Does the patient have an active prescription (recently filled or refills available) for medication(s) requested? No    Does the patient have intermediate Plus and need 100 day supply (blood pressure, diabetes and cholesterol meds only)? Patient does not have SCP

## 2023-06-13 ENCOUNTER — OFFICE VISIT (OUTPATIENT)
Dept: DERMATOLOGY | Facility: IMAGING CENTER | Age: 28
End: 2023-06-13
Payer: COMMERCIAL

## 2023-06-13 DIAGNOSIS — L81.4 LENTIGINES: ICD-10-CM

## 2023-06-13 DIAGNOSIS — L85.8 KERATOSIS PILARIS: ICD-10-CM

## 2023-06-13 DIAGNOSIS — D22.9 MULTIPLE NEVI: ICD-10-CM

## 2023-06-13 DIAGNOSIS — D18.01 CHERRY ANGIOMA: ICD-10-CM

## 2023-06-13 DIAGNOSIS — Z12.83 SKIN CANCER SCREENING: ICD-10-CM
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
## 2023-06-13 PROCEDURE — 99212 OFFICE O/P EST SF 10 MIN: CPT | Performed by: NURSE PRACTITIONER

## 2023-06-13 NOTE — PROGRESS NOTES
DERMATOLOGY NOTE  NEW VISIT       Chief complaint: Establish Care     Per patient has noticed new freckles on bilateral arm , lower extremities x 2 month   Have not grown , no pain . Would like a KEYSHAWN       History of skin cancer: No  History of precancers/actinic keratoses: No  History of biopsies:No  History of blistering/severe sunburns:No  Family history of skin cancer:No  Family history of atypical moles:No      Allergies   Allergen Reactions    Penicillin G      Other reaction(s): unknown- reaction happened when she was a little girl    Tree Nuts Food Allergy Anaphylaxis, Anxiety, Hives, Itching, Shortness of Breath and Swelling    Pcn [Penicillins]      hives        MEDICATIONS:  Medications relevant to specialty reviewed.     REVIEW OF SYSTEMS:   Positive for skin (see HPI)  Negative for fevers and chills       EXAM:  There were no vitals taken for this visit.  Constitutional: Well-developed, well-nourished, and in no distress.     A total body skin exam was performed excluding the genitals per patient preference and including the following areas: head (including face), neck, chest, abdomen, groin/buttocks, back, bilateral upper extremities, and bilateral lower extremities with the following pertinent findings listed below and/or in assessment/plan.     -sun exposed skin of trunk and b/l upper, lower extremities and face with very few scattered clinically benign light brown reticulated macules all of which were morphologically similar and none of which were suspicious for skin cancer today on exam  Very few scattered 1-3mm bright red macules and thin papules on the trunk and extremities  Multiple tan medium brown skin-colored macules papules scattered over the trunk, face and extremities--All with benign-appearing pigment network patterns on dermoscopy  KP buttocks and BLE  Numerous erythematous papules in varying stages of healing      IMPRESSION / PLAN:    1. Lentigines  - Benign-appearing nature of lesions  discussed during exam.   - Advised to continue to monitor for any return to clinic for new or concerning changes.      2. Cherry angioma  - Benign-appearing nature of lesions discussed during exam.   - Advised to continue to monitor for any return to clinic for new or concerning changes.        3. Multiple nevi  - Benign-appearing nature of lesions discussed during exam.   - Advised to continue to monitor for any return to clinic for new or concerning changes.      4. Keratosis pilaris  - Benign-appearing nature of lesions discussed during exam.   - Advised to continue to monitor for any return to clinic for new or concerning changes.      5. Multiple insect bites  Was visiting [place] and suffered multiple mosquito and fire ant bites  resolving    6. Skin cancer screening  Skin cancer education  discussed importance of sun protective clothing, eyewear in addition to the use of broad spectrum sunscreen with SPF 30 or greater, as well as need for reapplication ~every 2 hours when exposed to UVR  discussed importance following up for any new or changing lesions as noted in handout given, but every 12 months exams in clinic in the setting of dermatologic history  ABCDE's of melanoma discussed/handout given          Please note that this dictation was created using voice recognition software. I have made every reasonable attempt to correct obvious errors, but I expect that there are errors of grammar and possibly content that I did not discover before finalizing the note.      Return to clinic in: Return in about 1 year (around 6/13/2024) for KEYSHAWN. and as needed for any new or changing skin lesions.

## 2023-06-26 ENCOUNTER — HOSPITAL ENCOUNTER (OUTPATIENT)
Dept: LAB | Facility: MEDICAL CENTER | Age: 28
End: 2023-06-26
Attending: OBSTETRICS & GYNECOLOGY
Payer: COMMERCIAL

## 2023-06-26 LAB
ABO GROUP BLD: NORMAL
BASOPHILS # BLD AUTO: 0.5 % (ref 0–1.8)
BASOPHILS # BLD: 0.06 K/UL (ref 0–0.12)
BLD GP AB SCN SERPL QL: NORMAL
EOSINOPHIL # BLD AUTO: 0.3 K/UL (ref 0–0.51)
EOSINOPHIL NFR BLD: 2.4 % (ref 0–6.9)
ERYTHROCYTE [DISTWIDTH] IN BLOOD BY AUTOMATED COUNT: 42.6 FL (ref 35.9–50)
HBV SURFACE AG SER QL: ABNORMAL
HCT VFR BLD AUTO: 39.5 % (ref 37–47)
HCV AB SER QL: NORMAL
HGB BLD-MCNC: 13.5 G/DL (ref 12–16)
HIV 1+2 AB+HIV1 P24 AG SERPL QL IA: NORMAL
IMM GRANULOCYTES # BLD AUTO: 0.04 K/UL (ref 0–0.11)
IMM GRANULOCYTES NFR BLD AUTO: 0.3 % (ref 0–0.9)
LYMPHOCYTES # BLD AUTO: 2.17 K/UL (ref 1–4.8)
LYMPHOCYTES NFR BLD: 17.6 % (ref 22–41)
MCH RBC QN AUTO: 30.3 PG (ref 27–33)
MCHC RBC AUTO-ENTMCNC: 34.2 G/DL (ref 32.2–35.5)
MCV RBC AUTO: 88.8 FL (ref 81.4–97.8)
MONOCYTES # BLD AUTO: 0.47 K/UL (ref 0–0.85)
MONOCYTES NFR BLD AUTO: 3.8 % (ref 0–13.4)
NEUTROPHILS # BLD AUTO: 9.32 K/UL (ref 1.82–7.42)
NEUTROPHILS NFR BLD: 75.4 % (ref 44–72)
NRBC # BLD AUTO: 0 K/UL
NRBC BLD-RTO: 0 /100 WBC (ref 0–0.2)
PLATELET # BLD AUTO: 415 K/UL (ref 164–446)
PMV BLD AUTO: 8.4 FL (ref 9–12.9)
RBC # BLD AUTO: 4.45 M/UL (ref 4.2–5.4)
RH BLD: NORMAL
RUBV AB SER QL: 142 IU/ML
T PALLIDUM AB SER QL IA: ABNORMAL
WBC # BLD AUTO: 12.4 K/UL (ref 4.8–10.8)

## 2023-06-26 PROCEDURE — 87340 HEPATITIS B SURFACE AG IA: CPT

## 2023-06-26 PROCEDURE — 86850 RBC ANTIBODY SCREEN: CPT

## 2023-06-26 PROCEDURE — 86803 HEPATITIS C AB TEST: CPT

## 2023-06-26 PROCEDURE — 86901 BLOOD TYPING SEROLOGIC RH(D): CPT

## 2023-06-26 PROCEDURE — 87086 URINE CULTURE/COLONY COUNT: CPT

## 2023-06-26 PROCEDURE — 86900 BLOOD TYPING SEROLOGIC ABO: CPT

## 2023-06-26 PROCEDURE — 86780 TREPONEMA PALLIDUM: CPT

## 2023-06-26 PROCEDURE — 87389 HIV-1 AG W/HIV-1&-2 AB AG IA: CPT

## 2023-06-26 PROCEDURE — 86762 RUBELLA ANTIBODY: CPT

## 2023-06-26 PROCEDURE — 85025 COMPLETE CBC W/AUTO DIFF WBC: CPT

## 2023-06-28 LAB
BACTERIA UR CULT: NORMAL
SIGNIFICANT IND 70042: NORMAL
SITE SITE: NORMAL
SOURCE SOURCE: NORMAL

## 2023-07-15 ENCOUNTER — OFFICE VISIT (OUTPATIENT)
Dept: URGENT CARE | Facility: PHYSICIAN GROUP | Age: 28
End: 2023-07-15
Payer: COMMERCIAL

## 2023-07-15 VITALS
BODY MASS INDEX: 34.78 KG/M2 | HEIGHT: 62 IN | RESPIRATION RATE: 16 BRPM | HEART RATE: 88 BPM | SYSTOLIC BLOOD PRESSURE: 110 MMHG | OXYGEN SATURATION: 96 % | WEIGHT: 189 LBS | TEMPERATURE: 97.9 F | DIASTOLIC BLOOD PRESSURE: 68 MMHG
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
## 2023-07-15 DIAGNOSIS — S40.862A INSECT BITE OF LEFT UPPER ARM, INITIAL ENCOUNTER: ICD-10-CM

## 2023-07-15 PROCEDURE — 99213 OFFICE O/P EST LOW 20 MIN: CPT

## 2023-07-15 PROCEDURE — 3078F DIAST BP <80 MM HG: CPT

## 2023-07-15 PROCEDURE — 3074F SYST BP LT 130 MM HG: CPT

## 2023-07-15 RX ORDER — HYDROXYZINE HYDROCHLORIDE 25 MG/1
TABLET, FILM COATED ORAL
COMMUNITY
Start: 2020-09-05 | End: 2023-07-15

## 2023-07-15 RX ORDER — ALBUTEROL SULFATE 90 UG/1
1-2 AEROSOL, METERED RESPIRATORY (INHALATION) EVERY 6 HOURS PRN
COMMUNITY
Start: 2022-12-19 | End: 2023-07-15

## 2023-07-15 RX ORDER — NITROFURANTOIN 25; 75 MG/1; MG/1
CAPSULE ORAL
COMMUNITY
Start: 2023-06-30 | End: 2023-07-15

## 2023-07-15 ASSESSMENT — ENCOUNTER SYMPTOMS
CHILLS: 0
DIAPHORESIS: 0
FEVER: 0
WEIGHT LOSS: 0

## 2023-07-15 ASSESSMENT — FIBROSIS 4 INDEX: FIB4 SCORE: 0.26

## 2023-07-15 NOTE — PROGRESS NOTES
Subjective:     Chelsea Carrillo is a 28 y.o. female who presents for Bug Bite (X1 day she noticed it yesterday they were small but have progressively enlarged today, pregnant, warm to the touch  )    The patient reports that she sustained two bug bites on her left upper arm one day ago after an outdoor meal. Initially, there were were two small raised bites that were itchy and red. This morning, the bites were larger, painful, itchy, and have tripled in size.  Denies fever, chills, travel outside of the [facility], camping, interactions with spiders.  Treatments tried include 3-4 applications of OTC hydrocortisone cream. She takes daily Aller-Mushtaq. The treatments provided not relief.  She is concerned about the possibility of cellulitis as her mother is a nurse and verbalized that she needed to seek treatment.  She is 11 weeks pregnant at the time of examination.      Review of Systems   Constitutional:  Negative for chills, diaphoresis, fever, malaise/fatigue and weight loss.   Skin:  Positive for itching. Negative for rash.        Two raised papules measuring approximately 3 cm in circumference   All other systems reviewed and are negative.      PMH:   Past Medical History:   Diagnosis Date    Anxiety     Asthma     Bronchitis     Depression     Heart murmur     Influenza     Nasal drainage     Pneumonia      ALLERGIES:   Allergies   Allergen Reactions    Penicillin G      Other reaction(s): unknown- reaction happened when she was a little girl    Tree Nuts Food Allergy Anaphylaxis, Anxiety, Hives, Itching, Shortness of Breath and Swelling    Pcn [Penicillins]      hives     SURGHX:   Past Surgical History:   Procedure Laterality Date    TONSILLECTOMY       SOCHX:   Social History     Socioeconomic History    Marital status:    Tobacco Use    Smoking status: Former     Packs/day: 0.00     Years: 5.00     Pack years: 0.00     Types: Cigarettes     Quit date:      Years since quittin.5     "Smokeless tobacco: Never   Vaping Use    Vaping Use: Never used   Substance and Sexual Activity    Alcohol use: Not Currently    Drug use: Never   Social History Narrative    ** Merged History Encounter **         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   Constitutional:       General: She is not in acute distress.     Appearance: Normal appearance. She is not ill-appearing or toxic-appearing.   HENT:      Head: Normocephalic and atraumatic.      Nose: Nose normal.      Mouth/Throat:      Mouth: Mucous membranes are moist.   Eyes:      Extraocular Movements: Extraocular movements intact.      Conjunctiva/sclera: Conjunctivae normal.      Pupils: Pupils are equal, round, and reactive to light.   Cardiovascular:      Rate and Rhythm: Normal rate and regular rhythm.   Pulmonary:      Effort: Pulmonary effort is normal.   Musculoskeletal:         General: Normal range of motion.      Cervical back: Normal range of motion.   Skin:     General: Skin is warm and dry.      Findings: Rash present. Rash is papular and urticarial.             Comments: 2 to 3 cm well demarcated erythematous papules present on the anterior and posterior aspect of the right upper extremity.  Lesion borders marked by patient.    Neurological:      Mental Status: She is alert.   Psychiatric:         Mood and Affect: Mood normal.         Behavior: Behavior normal.         Thought Content: Thought content normal.       Assessment/Plan:   Assessment      1. Insect bite of left upper arm, initial encounter  - hydrocortisone 2.5 % Cream topical cream; Apply 1 Application topically 2 " times a day.  Dispense: 20 g; Refill: 0     Two well demarcated papules consistent with insect bite on the right upper extremity.  Discussed conservative management with patient as she is 11 weeks pregnant.  Prescription for topical 2.5% hydrocortisone sent to patient's preferred pharmacy.  She will apply this twice daily and engage in supportive care.  She will monitor for symptom improvement.  If she experiences signs and symptoms consistent with cellulitis or systemic infection, she will follow-up with her primary care or urgent care. All questions answered. Patient verbalized understanding and is in agreement with this plan of care.     If symptoms are worsening or not improving in 3-5 days, follow-up with PCP or return to UC. Differential diagnosis, natural history, and supportive care discussed. AVS handout given and reviewed with patient. Patient educated on red flags and when to seek treatment back in ED or UC.     I personally reviewed prior external notes and test results pertinent to today's visit.  I have independently reviewed and interpreted all diagnostics ordered during this urgent care visit.     This dictation has been created using voice recognition software. The accuracy of the dictation is limited by the abilities of the software. I expect there may be some errors of grammar and possibly content. I made every attempt to manually correct the errors within my dictation. However, errors related to voice recognition software may still exist and should be interpreted within the appropriate context.    This note was electronically signed by SHANIA Du

## 2023-08-22 ENCOUNTER — HOSPITAL ENCOUNTER (OUTPATIENT)
Dept: LAB | Facility: MEDICAL CENTER | Age: 28
End: 2023-08-22
Attending: OBSTETRICS & GYNECOLOGY
Payer: COMMERCIAL

## 2023-08-22 PROCEDURE — 82105 ALPHA-FETOPROTEIN SERUM: CPT

## 2023-08-24 LAB
# FETUSES US: NORMAL
AFP MOM SERPL: 0.64
AFP SERPL-MCNC: 22 NG/ML
AGE - REPORTED: 28 YR
CURRENT SMOKER: NO
FAMILY MEMBER DISEASES HX: NO
GA METHOD: NORMAL
GA: NORMAL WK
IDDM PATIENT QL: NO
INTEGRATED SCN PATIENT-IMP: NORMAL
SPECIMEN DRAWN SERPL: NORMAL

## 2023-09-20 RX ORDER — ALBUTEROL SULFATE 90 UG/1
AEROSOL, METERED RESPIRATORY (INHALATION)
Qty: 8.5 G | Refills: 1 | Status: SHIPPED | OUTPATIENT
Start: 2023-09-20 | End: 2023-12-29 | Stop reason: SDUPTHER

## 2023-09-20 NOTE — TELEPHONE ENCOUNTER
Received request via: Pharmacy    Was the patient seen in the last year in this department? Yes  LOV 12/07/2022  Does the patient have an active prescription (recently filled or refills available) for medication(s) requested? No    Does the patient have [facility] Plus and need 100 day supply (blood pressure, diabetes and cholesterol meds only)? Medication is not for cholesterol, blood pressure or diabetes and Patient does not have SCP

## 2023-10-09 ENCOUNTER — OFFICE VISIT (OUTPATIENT)
Dept: URGENT CARE | Facility: PHYSICIAN GROUP | Age: 28
End: 2023-10-09
Payer: COMMERCIAL

## 2023-10-09 VITALS
DIASTOLIC BLOOD PRESSURE: 60 MMHG | BODY MASS INDEX: 39.01 KG/M2 | RESPIRATION RATE: 18 BRPM | HEART RATE: 112 BPM | WEIGHT: 212 LBS | HEIGHT: 62 IN | SYSTOLIC BLOOD PRESSURE: 106 MMHG | OXYGEN SATURATION: 98 % | TEMPERATURE: 98.3 F

## 2023-10-09 DIAGNOSIS — J06.9 VIRAL URI WITH COUGH: ICD-10-CM

## 2023-10-09 DIAGNOSIS — Z3A.24 24 WEEKS GESTATION OF PREGNANCY: ICD-10-CM

## 2023-10-09 LAB
FLUAV RNA SPEC QL NAA+PROBE: NEGATIVE
FLUBV RNA SPEC QL NAA+PROBE: NEGATIVE
RSV RNA SPEC QL NAA+PROBE: NEGATIVE
S PYO DNA SPEC NAA+PROBE: NOT DETECTED
SARS-COV-2 RNA RESP QL NAA+PROBE: NEGATIVE

## 2023-10-09 PROCEDURE — 3074F SYST BP LT 130 MM HG: CPT | Performed by: STUDENT IN AN ORGANIZED HEALTH CARE EDUCATION/TRAINING PROGRAM

## 2023-10-09 PROCEDURE — 87651 STREP A DNA AMP PROBE: CPT | Performed by: STUDENT IN AN ORGANIZED HEALTH CARE EDUCATION/TRAINING PROGRAM

## 2023-10-09 PROCEDURE — 3078F DIAST BP <80 MM HG: CPT | Performed by: STUDENT IN AN ORGANIZED HEALTH CARE EDUCATION/TRAINING PROGRAM

## 2023-10-09 PROCEDURE — 0241U POCT CEPHEID COV-2, FLU A/B, RSV - PCR: CPT | Performed by: STUDENT IN AN ORGANIZED HEALTH CARE EDUCATION/TRAINING PROGRAM

## 2023-10-09 PROCEDURE — 99213 OFFICE O/P EST LOW 20 MIN: CPT | Performed by: STUDENT IN AN ORGANIZED HEALTH CARE EDUCATION/TRAINING PROGRAM

## 2023-10-09 RX ORDER — NITROFURANTOIN 25; 75 MG/1; MG/1
CAPSULE ORAL
COMMUNITY
Start: 2023-06-30 | End: 2023-11-20

## 2023-10-09 ASSESSMENT — FIBROSIS 4 INDEX: FIB4 SCORE: 0.26

## 2023-10-09 NOTE — PROGRESS NOTES
"Subjective:   Chelsea Carrillo is a 28 y.o. female who presents for Pharyngitis (Congested,fatigue, cough x ` 2day)      HPI:  28-year-old female presents the urgent care for 2 days of a sore throat, nasal congestion, fatigue, and a dry cough.  She reports being 24 weeks pregnant.  She is able to maintain adequate oral intake of fluids and solids.  States that she also came in to see what she may take that safe in pregnancy.  No fever, nausea, vomiting abdominal pain, vaginal discharge, vaginal odor, vaginal bleeding, chest pain, sputum production, wheezing, headache, or dizziness.      Medications:    albuterol Aers  EPINEPHrine Soaj  fluticasone-salmeterol Aepb  hydrocortisone Crea  hydrOXYzine HCl Tabs  nitrofurantoin Caps  sertraline Tabs    Allergies: Penicillin g, Tree nuts food allergy, and Pcn [penicillins]    Problem List: Chelsea Carrillo does not have any pertinent problems on file.    Surgical History:  Past Surgical History:   Procedure Laterality Date    TONSILLECTOMY         Past Social Hx: Chelsea Carrillo  reports that she quit smoking about 9 years ago. She has never used smokeless tobacco. She reports that she does not currently use alcohol. She reports that she does not use drugs.     Past Family Hx:  Chelsea Carrillo family history includes Sleep Apnea in her father and mother.     Problem list, medications, and allergies reviewed by myself today in Epic.     Objective:     /60 (BP Location: Left arm, Patient Position: Sitting, BP Cuff Size: Adult)   Pulse (!) 112   Temp 36.8 °C (98.3 °F) (Temporal)   Resp 18   Ht 1.575 m (5' 2\")   Wt 96.2 kg (212 lb)   LMP 11/05/2022 (Exact Date)   SpO2 98%   BMI 38.78 kg/m²     Physical Exam  Vitals reviewed.   Constitutional:       Appearance: Normal appearance.   HENT:      Right Ear: Tympanic membrane, ear canal and external ear normal.      Left Ear: Tympanic membrane, ear canal and external ear normal.      Nose: Congestion present.      " Mouth/Throat:      Mouth: Mucous membranes are moist.      Pharynx: Posterior oropharyngeal erythema present. No oropharyngeal exudate.   Eyes:      Conjunctiva/sclera: Conjunctivae normal.      Pupils: Pupils are equal, round, and reactive to light.   Cardiovascular:      Rate and Rhythm: Normal rate and regular rhythm.      Pulses: Normal pulses.      Heart sounds: Normal heart sounds. No murmur heard.  Pulmonary:      Effort: Pulmonary effort is normal. No respiratory distress.      Breath sounds: Normal breath sounds. No stridor. No wheezing, rhonchi or rales.   Musculoskeletal:      Cervical back: Normal range of motion.   Lymphadenopathy:      Cervical: No cervical adenopathy.   Neurological:      Mental Status: She is alert.         Lab Results/POC Test Results   Results for orders placed or performed in visit on 10/09/23   POCT GROUP A STREP, PCR   Result Value Ref Range    POC Group A Strep, PCR Not Detected Not Detected, Invalid   POCT CoV-2, Flu A/B, RSV by PCR   Result Value Ref Range    SARS-CoV-2 by PCR Negative Negative, Invalid    Influenza virus A RNA Negative Negative, Invalid    Influenza virus B, PCR Negative Negative, Invalid    RSV, PCR Negative Negative, Invalid           Assessment/Plan:     Diagnosis and associated orders:     1. Viral URI with cough  POCT GROUP A STREP, PCR    POCT CoV-2, Flu A/B, RSV by PCR      2. 24 weeks gestation of pregnancy           Comments/MDM:     PCR group A strep negative.  PCR COVID-19, influenza, and RSV negative.  Patient's presentation and physical exam findings are consistent with viral upper respiratory tract infection.  This should be self-limited.  Pulmonary exam shows no abnormal findings.  No signs of pneumonia.  No otitis media bilaterally.  Discussed safe medications to take at home during pregnancy.  Mucinex and Robitussin have both been shown to be safe.  May take Tylenol.  Avoid NSAIDs.  Nasal saline spray.  ED/return precautions given.          Differential diagnosis, natural history, supportive care, and indications for immediate follow-up discussed.    Advised the patient to follow-up with the primary care physician for recheck, reevaluation, and consideration of further management.    Please note that this dictation was created using voice recognition software. I have made a reasonable attempt to correct obvious errors, but I expect that there are errors of grammar and possibly content that I did not discover before finalizing the note.    Electronically signed by Vik Rooney PA-C.

## 2023-10-13 ENCOUNTER — PHARMACY VISIT (OUTPATIENT)
Dept: PHARMACY | Facility: MEDICAL CENTER | Age: 28
End: 2023-10-13
Payer: COMMERCIAL

## 2023-10-13 PROCEDURE — RXMED WILLOW AMBULATORY MEDICATION CHARGE: Performed by: INTERNAL MEDICINE

## 2023-10-13 RX ORDER — INFLUENZA A VIRUS A/BRISBANE/02/2018 IVR-190 (H1N1) ANTIGEN (FORMALDEHYDE INACTIVATED), INFLUENZA A VIRUS A/KANSAS/14/2017 X-327 (H3N2) ANTIGEN (FORMALDEHYDE INACTIVATED), INFLUENZA B VIRUS B/PHUKET/3073/2013 ANTIGEN (FORMALDEHYDE INACTIVATED), AND INFLUENZA B VIRUS B/MARYLAND/15/2016 BX-69A ANTIGEN (FORMALDEHYDE INACTIVATED) 15; 15; 15; 15 UG/.5ML; UG/.5ML; UG/.5ML; UG/.5ML
INJECTION, SUSPENSION INTRAMUSCULAR
Qty: 0.5 ML | Refills: 0 | OUTPATIENT
Start: 2023-10-13

## 2023-11-01 NOTE — TELEPHONE ENCOUNTER
Received request via: Pharmacy    Was the patient seen in the last year in this department? Yes  12/7/22  Does the patient have an active prescription (recently filled or refills available) for medication(s) requested? No    Does the patient have long term Plus and need 100 day supply (blood pressure, diabetes and cholesterol meds only)? Medication is not for cholesterol, blood pressure or diabetes

## 2023-11-08 ENCOUNTER — HOSPITAL ENCOUNTER (OUTPATIENT)
Dept: LAB | Facility: MEDICAL CENTER | Age: 28
End: 2023-11-08
Attending: NURSE PRACTITIONER
Payer: COMMERCIAL

## 2023-11-08 LAB
BASOPHILS # BLD AUTO: 0.3 % (ref 0–1.8)
BASOPHILS # BLD: 0.04 K/UL (ref 0–0.12)
EOSINOPHIL # BLD AUTO: 0.46 K/UL (ref 0–0.51)
EOSINOPHIL NFR BLD: 3 % (ref 0–6.9)
ERYTHROCYTE [DISTWIDTH] IN BLOOD BY AUTOMATED COUNT: 42.5 FL (ref 35.9–50)
GLUCOSE 1H P 50 G GLC PO SERPL-MCNC: 166 MG/DL (ref 70–139)
HCT VFR BLD AUTO: 38 % (ref 37–47)
HGB BLD-MCNC: 12.6 G/DL (ref 12–16)
IMM GRANULOCYTES # BLD AUTO: 0.08 K/UL (ref 0–0.11)
IMM GRANULOCYTES NFR BLD AUTO: 0.5 % (ref 0–0.9)
LYMPHOCYTES # BLD AUTO: 1.63 K/UL (ref 1–4.8)
LYMPHOCYTES NFR BLD: 10.7 % (ref 22–41)
MCH RBC QN AUTO: 28.8 PG (ref 27–33)
MCHC RBC AUTO-ENTMCNC: 33.2 G/DL (ref 32.2–35.5)
MCV RBC AUTO: 86.8 FL (ref 81.4–97.8)
MONOCYTES # BLD AUTO: 0.5 K/UL (ref 0–0.85)
MONOCYTES NFR BLD AUTO: 3.3 % (ref 0–13.4)
NEUTROPHILS # BLD AUTO: 12.56 K/UL (ref 1.82–7.42)
NEUTROPHILS NFR BLD: 82.2 % (ref 44–72)
NRBC # BLD AUTO: 0 K/UL
NRBC BLD-RTO: 0 /100 WBC (ref 0–0.2)
PLATELET # BLD AUTO: 369 K/UL (ref 164–446)
PMV BLD AUTO: 8.8 FL (ref 9–12.9)
RBC # BLD AUTO: 4.38 M/UL (ref 4.2–5.4)
T PALLIDUM AB SER QL IA: NORMAL
WBC # BLD AUTO: 15.3 K/UL (ref 4.8–10.8)

## 2023-11-08 PROCEDURE — 82950 GLUCOSE TEST: CPT

## 2023-11-08 PROCEDURE — 86780 TREPONEMA PALLIDUM: CPT

## 2023-11-08 PROCEDURE — 85025 COMPLETE CBC W/AUTO DIFF WBC: CPT

## 2023-11-10 ENCOUNTER — HOSPITAL ENCOUNTER (OUTPATIENT)
Dept: LAB | Facility: MEDICAL CENTER | Age: 28
End: 2023-11-10
Attending: OBSTETRICS & GYNECOLOGY
Payer: COMMERCIAL

## 2023-11-10 LAB
GLUCOSE 1H P CHAL SERPL-MCNC: 215 MG/DL (ref 65–180)
GLUCOSE 2H P CHAL SERPL-MCNC: 185 MG/DL (ref 65–155)
GLUCOSE 3H P CHAL SERPL-MCNC: 158 MG/DL (ref 65–140)
GLUCOSE BS SERPL-MCNC: 81 MG/DL (ref 65–95)

## 2023-11-10 PROCEDURE — 82951 GLUCOSE TOLERANCE TEST (GTT): CPT

## 2023-11-10 PROCEDURE — 82952 GTT-ADDED SAMPLES: CPT

## 2023-11-17 SDOH — ECONOMIC STABILITY: HOUSING INSECURITY: IN THE LAST 12 MONTHS, HOW MANY PLACES HAVE YOU LIVED?: 1

## 2023-11-17 SDOH — ECONOMIC STABILITY: TRANSPORTATION INSECURITY
IN THE PAST 12 MONTHS, HAS THE LACK OF TRANSPORTATION KEPT YOU FROM MEDICAL APPOINTMENTS OR FROM GETTING MEDICATIONS?: NO

## 2023-11-17 SDOH — HEALTH STABILITY: PHYSICAL HEALTH: ON AVERAGE, HOW MANY MINUTES DO YOU ENGAGE IN EXERCISE AT THIS LEVEL?: 20 MIN

## 2023-11-17 SDOH — ECONOMIC STABILITY: HOUSING INSECURITY
IN THE LAST 12 MONTHS, WAS THERE A TIME WHEN YOU DID NOT HAVE A STEADY PLACE TO SLEEP OR SLEPT IN A SHELTER (INCLUDING NOW)?: NO

## 2023-11-17 SDOH — ECONOMIC STABILITY: TRANSPORTATION INSECURITY
IN THE PAST 12 MONTHS, HAS LACK OF TRANSPORTATION KEPT YOU FROM MEETINGS, WORK, OR FROM GETTING THINGS NEEDED FOR DAILY LIVING?: NO

## 2023-11-17 SDOH — ECONOMIC STABILITY: FOOD INSECURITY: WITHIN THE PAST 12 MONTHS, YOU WORRIED THAT YOUR FOOD WOULD RUN OUT BEFORE YOU GOT MONEY TO BUY MORE.: NEVER TRUE

## 2023-11-17 SDOH — ECONOMIC STABILITY: INCOME INSECURITY: IN THE LAST 12 MONTHS, WAS THERE A TIME WHEN YOU WERE NOT ABLE TO PAY THE MORTGAGE OR RENT ON TIME?: NO

## 2023-11-17 SDOH — ECONOMIC STABILITY: INCOME INSECURITY: HOW HARD IS IT FOR YOU TO PAY FOR THE VERY BASICS LIKE FOOD, HOUSING, MEDICAL CARE, AND HEATING?: NOT HARD AT ALL

## 2023-11-17 SDOH — ECONOMIC STABILITY: TRANSPORTATION INSECURITY
IN THE PAST 12 MONTHS, HAS LACK OF RELIABLE TRANSPORTATION KEPT YOU FROM MEDICAL APPOINTMENTS, MEETINGS, WORK OR FROM GETTING THINGS NEEDED FOR DAILY LIVING?: NO

## 2023-11-17 SDOH — ECONOMIC STABILITY: FOOD INSECURITY: WITHIN THE PAST 12 MONTHS, THE FOOD YOU BOUGHT JUST DIDN'T LAST AND YOU DIDN'T HAVE MONEY TO GET MORE.: NEVER TRUE

## 2023-11-17 SDOH — HEALTH STABILITY: PHYSICAL HEALTH: ON AVERAGE, HOW MANY DAYS PER WEEK DO YOU ENGAGE IN MODERATE TO STRENUOUS EXERCISE (LIKE A BRISK WALK)?: 2 DAYS

## 2023-11-17 SDOH — HEALTH STABILITY: MENTAL HEALTH
STRESS IS WHEN SOMEONE FEELS TENSE, NERVOUS, ANXIOUS, OR CAN'T SLEEP AT NIGHT BECAUSE THEIR MIND IS TROUBLED. HOW STRESSED ARE YOU?: TO SOME EXTENT

## 2023-11-17 ASSESSMENT — LIFESTYLE VARIABLES
SKIP TO QUESTIONS 9-10: 1
HOW OFTEN DO YOU HAVE A DRINK CONTAINING ALCOHOL: NEVER
HOW OFTEN DO YOU HAVE SIX OR MORE DRINKS ON ONE OCCASION: NEVER
AUDIT-C TOTAL SCORE: 0
HOW MANY STANDARD DRINKS CONTAINING ALCOHOL DO YOU HAVE ON A TYPICAL DAY: PATIENT DOES NOT DRINK

## 2023-11-17 ASSESSMENT — SOCIAL DETERMINANTS OF HEALTH (SDOH)
HOW MANY DRINKS CONTAINING ALCOHOL DO YOU HAVE ON A TYPICAL DAY WHEN YOU ARE DRINKING: PATIENT DOES NOT DRINK
HOW OFTEN DO YOU HAVE A DRINK CONTAINING ALCOHOL: NEVER
IN A TYPICAL WEEK, HOW MANY TIMES DO YOU TALK ON THE PHONE WITH FAMILY, FRIENDS, OR NEIGHBORS?: MORE THAN THREE TIMES A WEEK
DO YOU BELONG TO ANY CLUBS OR ORGANIZATIONS SUCH AS CHURCH GROUPS UNIONS, FRATERNAL OR ATHLETIC GROUPS, OR SCHOOL GROUPS?: NO
HOW OFTEN DO YOU GET TOGETHER WITH FRIENDS OR RELATIVES?: ONCE A WEEK
HOW OFTEN DO YOU ATTEND CHURCH OR RELIGIOUS SERVICES?: PATIENT DECLINED
IN A TYPICAL WEEK, HOW MANY TIMES DO YOU TALK ON THE PHONE WITH FAMILY, FRIENDS, OR NEIGHBORS?: MORE THAN THREE TIMES A WEEK
HOW OFTEN DO YOU GET TOGETHER WITH FRIENDS OR RELATIVES?: ONCE A WEEK
DO YOU BELONG TO ANY CLUBS OR ORGANIZATIONS SUCH AS CHURCH GROUPS UNIONS, FRATERNAL OR ATHLETIC GROUPS, OR SCHOOL GROUPS?: NO
HOW OFTEN DO YOU ATTENT MEETINGS OF THE CLUB OR ORGANIZATION YOU BELONG TO?: PATIENT DECLINED
HOW OFTEN DO YOU ATTENT MEETINGS OF THE CLUB OR ORGANIZATION YOU BELONG TO?: PATIENT DECLINED
HOW HARD IS IT FOR YOU TO PAY FOR THE VERY BASICS LIKE FOOD, HOUSING, MEDICAL CARE, AND HEATING?: NOT HARD AT ALL
WITHIN THE PAST 12 MONTHS, YOU WORRIED THAT YOUR FOOD WOULD RUN OUT BEFORE YOU GOT THE MONEY TO BUY MORE: NEVER TRUE
HOW OFTEN DO YOU HAVE SIX OR MORE DRINKS ON ONE OCCASION: NEVER
HOW OFTEN DO YOU ATTEND CHURCH OR RELIGIOUS SERVICES?: PATIENT DECLINED

## 2023-11-20 ENCOUNTER — OFFICE VISIT (OUTPATIENT)
Dept: MEDICAL GROUP | Facility: LAB | Age: 28
End: 2023-11-20
Payer: COMMERCIAL

## 2023-11-20 VITALS
WEIGHT: 214 LBS | OXYGEN SATURATION: 93 % | RESPIRATION RATE: 12 BRPM | TEMPERATURE: 97.5 F | SYSTOLIC BLOOD PRESSURE: 100 MMHG | HEIGHT: 62 IN | HEART RATE: 99 BPM | BODY MASS INDEX: 39.38 KG/M2 | DIASTOLIC BLOOD PRESSURE: 50 MMHG

## 2023-11-20 DIAGNOSIS — Z00.00 WELL ADULT EXAM: ICD-10-CM

## 2023-11-20 DIAGNOSIS — J45.20 MILD INTERMITTENT ASTHMA WITHOUT COMPLICATION: ICD-10-CM

## 2023-11-20 DIAGNOSIS — O24.419 GESTATIONAL DIABETES MELLITUS (GDM) IN THIRD TRIMESTER, GESTATIONAL DIABETES METHOD OF CONTROL UNSPECIFIED: ICD-10-CM

## 2023-11-20 PROCEDURE — 3074F SYST BP LT 130 MM HG: CPT | Performed by: FAMILY MEDICINE

## 2023-11-20 PROCEDURE — 3078F DIAST BP <80 MM HG: CPT | Performed by: FAMILY MEDICINE

## 2023-11-20 PROCEDURE — 99395 PREV VISIT EST AGE 18-39: CPT | Performed by: FAMILY MEDICINE

## 2023-11-20 RX ORDER — ALBUTEROL SULFATE 2.5 MG/3ML
2.5 SOLUTION RESPIRATORY (INHALATION) EVERY 4 HOURS PRN
Qty: 30 ML | Refills: 3 | Status: SHIPPED | OUTPATIENT
Start: 2023-11-20 | End: 2023-12-29 | Stop reason: SDUPTHER

## 2023-11-20 ASSESSMENT — FIBROSIS 4 INDEX: FIB4 SCORE: 0.29

## 2023-11-20 NOTE — PROGRESS NOTES
Subjective:     CC:   Chief Complaint   Patient presents with    Annual Exam       HPI:   Chelsea Carrillo is a 28 y.o. female who presents for annual exam    Patient has GYN provider: Yes - currently pregnant 29w5d.  Following with OB for gestational diabetes which was just recently diagnosed.  Has a visit later today to discuss treatment with them  Last Tdap: 23  Received HPV series: Yes    Diet: Working on gestational diabetes diet    OB History    Para Term  AB Living   2 0 0 0 0 0   SAB IAB Ectopic Molar Multiple Live Births   0 0 0 0 0 0      She  has no history on file for sexual activity.    She  has a past medical history of Anxiety (), Asthma, Bronchitis, Depression (), Heart murmur, Influenza, Nasal drainage, and Pneumonia.  She  has a past surgical history that includes tonsillectomy.    Family History   Problem Relation Age of Onset    Sleep Apnea Mother     Sleep Apnea Father      Social History     Tobacco Use    Smoking status: Former     Current packs/day: 0.00     Types: Cigarettes     Quit date:      Years since quittin.8    Smokeless tobacco: Never   Vaping Use    Vaping Use: Never used   Substance Use Topics    Alcohol use: Not Currently    Drug use: Never       Patient Active Problem List    Diagnosis Date Noted    Obesity 2020    Former smoker 2020    DANISHA (obstructive sleep apnea) 2020    Mild intermittent asthma without complication 2019    Other insomnia 2019    Body mass index 34.0-34.9, adult 2019     Current Outpatient Medications   Medication Sig Dispense Refill    sertraline (ZOLOFT) 50 MG Tab TAKE ONE AND ONE-HALF TABLETS BY MOUTH DAILY 135 Tablet 1    influenza vaccine quad (FLUZONE QUADRIVALENT) 0.5 ML Suspension Prefilled Syringe injection Inject 0.5 ml into the shoulder, thigh, or buttocks. 0.5 mL 0    nitrofurantoin (MACROBID) 100 MG Cap  (Patient not taking: Reported on 10/9/2023)      albuterol 108 (90 Base)  "MCG/ACT Aero Soln inhalation aerosol INHALE ONE TO TWO PUFFS BY MOUTH EVERY 6 HOURS AS NEEDED FOR SHORTNESS OF BREATH 8.5 g 1    hydrocortisone 2.5 % Cream topical cream Apply 1 Application topically 2 times a day. 20 g 0    hydrOXYzine HCl (ATARAX) 25 MG Tab Take 1-2 Tablets by mouth every 8 hours as needed for Anxiety. 30 Tablet 1    fluticasone-salmeterol (ADVAIR) 250-50 MCG/ACT AEROSOL POWDER, BREATH ACTIVATED INHALE ONE PUFF BY MOUTH TWICE A DAY 60 Each 1    EPINEPHrine (EPIPEN) 0.3 MG/0.3ML Solution Auto-injector solution for injection INJECT INTO THE MIDDLE OF THE OUTER THIGH, SHOULDER, OR BUTTOCKS AND HOLD FOR 3 SECONDS AS NEEDED FOR SEVERE ALLERGIC REACTION FOR ONE DOSE, THEN CALL 911 IF USED. 2 Each 1     No current facility-administered medications for this visit.     Allergies   Allergen Reactions    Penicillin G      Other reaction(s): unknown- reaction happened when she was a little girl    Tree Nuts Food Allergy Anaphylaxis, Anxiety, Hives, Itching, Shortness of Breath and Swelling    Pcn [Penicillins]      hives       Review of Systems   Constitutional: Negative for fever, chills and malaise/fatigue.   HENT: Negative for congestion.    Eyes: Negative for pain.   Respiratory: Negative for cough and shortness of breath.    Cardiovascular: Negative for chest pain and leg swelling.   Gastrointestinal: Negative for nausea, vomiting, abdominal pain and diarrhea.   Genitourinary: Negative for dysuria and hematuria.   Skin: Negative for rash.   Neurological: Negative for dizziness, focal weakness and headaches.   Endo/Heme/Allergies: Does not bruise/bleed easily.   Psychiatric/Behavioral: Negative for depression.  The patient is not nervous/anxious.      Objective:   /50 (BP Location: Left arm, Patient Position: Sitting, BP Cuff Size: Adult)   Pulse 99   Temp 36.4 °C (97.5 °F)   Resp 12   Ht 1.575 m (5' 2\")   Wt 97.1 kg (214 lb)   LMP 11/05/2022 (Exact Date)   SpO2 93%   BMI 39.14 kg/m²     Wt " Readings from Last 4 Encounters:   11/20/23 97.1 kg (214 lb)   10/09/23 96.2 kg (212 lb)   07/15/23 85.7 kg (189 lb)   01/27/23 80.3 kg (177 lb)     Physical Exam:  Constitutional: Well-developed and well-nourished. Not diaphoretic. No distress.   Skin: Skin is warm and dry. No rash noted.  Head: Atraumatic without lesions.  Eyes: Conjunctivae and extraocular motions are normal. Pupils are equal, round, and reactive to light. No scleral icterus.   Ears:  External ears unremarkable.   Nose: Nares patent. Septum midline.   Mouth/Throat: Tongue normal. Oropharynx is clear and moist. Posterior pharynx without erythema or exudates.  Neck: Supple, trachea midline.   Cardiovascular: Regular rate and rhythm, S1 and S2 without murmur, rubs, or gallops.    Respiratory: Effort normal. Clear to auscultation throughout. No adventitious sounds.   Extremities: No cyanosis, clubbing, erythema, nor edema. Distal pulses intact and symmetric.   Musculoskeletal: All major joints AROM full in all directions without pain.  Neurological: Moves all 4 extremities.  No facial droop.  Psychiatric:  Behavior, mood, and affect are appropriate.    Assessment and Plan:     1. Well adult exam  Health maintenance reviewed and updated.  Age-appropriate anticipatory guidance provided.  Currently following with OB/GYN for pregnancy with labs recently done through them.    2. Mild intermittent asthma without complication  Chronic and stable.  Needs refill of albuterol nebulizers which she rarely uses but did recently need them when sick with upper respiratory symptoms.  Continue Advair daily.  - albuterol (PROVENTIL) 2.5mg/3ml Nebu Soln solution for nebulization; Take 3 mL by nebulization every four hours as needed for Shortness of Breath.  Dispense: 30 mL; Refill: 3    3. Gestational diabetes mellitus (GDM) in third trimester, gestational diabetes method of control unspecified  New diagnosis, has visit with OB/GYN today.    Follow-up: Return in about  1 year (around 11/20/2024), or if symptoms worsen or fail to improve.

## 2023-12-04 ENCOUNTER — TELEPHONE (OUTPATIENT)
Dept: MATERNAL FETAL MEDICINE | Facility: MEDICAL CENTER | Age: 28
End: 2023-12-04
Payer: COMMERCIAL

## 2023-12-20 ENCOUNTER — HOSPITAL ENCOUNTER (OUTPATIENT)
Facility: MEDICAL CENTER | Age: 28
End: 2023-12-20
Attending: NURSE PRACTITIONER
Payer: COMMERCIAL

## 2023-12-20 LAB
EST. AVERAGE GLUCOSE BLD GHB EST-MCNC: 120 MG/DL
HBA1C MFR BLD: 5.8 % (ref 4–5.6)

## 2023-12-20 PROCEDURE — 83036 HEMOGLOBIN GLYCOSYLATED A1C: CPT

## 2023-12-29 DIAGNOSIS — J45.20 MILD INTERMITTENT ASTHMA WITHOUT COMPLICATION: ICD-10-CM

## 2024-01-02 RX ORDER — FLUTICASONE PROPIONATE AND SALMETEROL 250; 50 UG/1; UG/1
1 POWDER RESPIRATORY (INHALATION) 2 TIMES DAILY
Qty: 60 EACH | Refills: 3 | Status: ON HOLD | OUTPATIENT
Start: 2024-01-02 | End: 2024-01-21

## 2024-01-02 RX ORDER — ALBUTEROL SULFATE 2.5 MG/3ML
2.5 SOLUTION RESPIRATORY (INHALATION) EVERY 4 HOURS PRN
Qty: 30 ML | Refills: 3 | Status: ON HOLD | OUTPATIENT
Start: 2024-01-02 | End: 2024-01-21

## 2024-01-02 RX ORDER — ALBUTEROL SULFATE 90 UG/1
1-2 AEROSOL, METERED RESPIRATORY (INHALATION) EVERY 6 HOURS PRN
Qty: 8.5 G | Refills: 3 | Status: ON HOLD | OUTPATIENT
Start: 2024-01-02 | End: 2024-01-21

## 2024-01-17 ENCOUNTER — APPOINTMENT (OUTPATIENT)
Dept: OBGYN | Facility: MEDICAL CENTER | Age: 29
End: 2024-01-17
Attending: OBSTETRICS & GYNECOLOGY
Payer: COMMERCIAL

## 2024-01-17 ENCOUNTER — HOSPITAL ENCOUNTER (INPATIENT)
Facility: MEDICAL CENTER | Age: 29
LOS: 4 days | End: 2024-01-21
Attending: OBSTETRICS & GYNECOLOGY | Admitting: OBSTETRICS & GYNECOLOGY
Payer: COMMERCIAL

## 2024-01-17 DIAGNOSIS — G89.18 POSTOPERATIVE PAIN: ICD-10-CM

## 2024-01-17 DIAGNOSIS — Z91.89 AT RISK FOR INEFFECTIVE BREASTFEEDING: Primary | ICD-10-CM

## 2024-01-17 LAB
BASOPHILS # BLD AUTO: 0.2 % (ref 0–1.8)
BASOPHILS # BLD: 0.03 K/UL (ref 0–0.12)
EOSINOPHIL # BLD AUTO: 0.22 K/UL (ref 0–0.51)
EOSINOPHIL NFR BLD: 1.5 % (ref 0–6.9)
ERYTHROCYTE [DISTWIDTH] IN BLOOD BY AUTOMATED COUNT: 44.1 FL (ref 35.9–50)
GLUCOSE BLD STRIP.AUTO-MCNC: 94 MG/DL (ref 65–99)
HCT VFR BLD AUTO: 37 % (ref 37–47)
HGB BLD-MCNC: 12.3 G/DL (ref 12–16)
HOLDING TUBE BB 8507: NORMAL
IMM GRANULOCYTES # BLD AUTO: 0.08 K/UL (ref 0–0.11)
IMM GRANULOCYTES NFR BLD AUTO: 0.5 % (ref 0–0.9)
LYMPHOCYTES # BLD AUTO: 2 K/UL (ref 1–4.8)
LYMPHOCYTES NFR BLD: 13.7 % (ref 22–41)
MCH RBC QN AUTO: 27.5 PG (ref 27–33)
MCHC RBC AUTO-ENTMCNC: 33.2 G/DL (ref 32.2–35.5)
MCV RBC AUTO: 82.6 FL (ref 81.4–97.8)
MONOCYTES # BLD AUTO: 0.64 K/UL (ref 0–0.85)
MONOCYTES NFR BLD AUTO: 4.4 % (ref 0–13.4)
NEUTROPHILS # BLD AUTO: 11.66 K/UL (ref 1.82–7.42)
NEUTROPHILS NFR BLD: 79.7 % (ref 44–72)
NRBC # BLD AUTO: 0 K/UL
NRBC BLD-RTO: 0 /100 WBC (ref 0–0.2)
PLATELET # BLD AUTO: 349 K/UL (ref 164–446)
PMV BLD AUTO: 8.8 FL (ref 9–12.9)
RBC # BLD AUTO: 4.48 M/UL (ref 4.2–5.4)
T PALLIDUM AB SER QL IA: NORMAL
WBC # BLD AUTO: 14.6 K/UL (ref 4.8–10.8)

## 2024-01-17 PROCEDURE — 700102 HCHG RX REV CODE 250 W/ 637 OVERRIDE(OP): Performed by: OBSTETRICS & GYNECOLOGY

## 2024-01-17 PROCEDURE — 85025 COMPLETE CBC W/AUTO DIFF WBC: CPT

## 2024-01-17 PROCEDURE — 36415 COLL VENOUS BLD VENIPUNCTURE: CPT

## 2024-01-17 PROCEDURE — A9270 NON-COVERED ITEM OR SERVICE: HCPCS | Performed by: OBSTETRICS & GYNECOLOGY

## 2024-01-17 PROCEDURE — 82962 GLUCOSE BLOOD TEST: CPT

## 2024-01-17 PROCEDURE — 86780 TREPONEMA PALLIDUM: CPT

## 2024-01-17 PROCEDURE — 770002 HCHG ROOM/CARE - OB PRIVATE (112)

## 2024-01-17 RX ORDER — SODIUM CHLORIDE, SODIUM LACTATE, POTASSIUM CHLORIDE, CALCIUM CHLORIDE 600; 310; 30; 20 MG/100ML; MG/100ML; MG/100ML; MG/100ML
INJECTION, SOLUTION INTRAVENOUS CONTINUOUS
Status: DISCONTINUED | OUTPATIENT
Start: 2024-01-17 | End: 2024-01-20 | Stop reason: HOSPADM

## 2024-01-17 RX ORDER — OXYTOCIN 10 [USP'U]/ML
10 INJECTION, SOLUTION INTRAMUSCULAR; INTRAVENOUS
Status: DISCONTINUED | OUTPATIENT
Start: 2024-01-17 | End: 2024-01-19 | Stop reason: HOSPADM

## 2024-01-17 RX ORDER — LIDOCAINE HYDROCHLORIDE 10 MG/ML
20 INJECTION, SOLUTION INFILTRATION; PERINEURAL
Status: DISCONTINUED | OUTPATIENT
Start: 2024-01-17 | End: 2024-01-19 | Stop reason: HOSPADM

## 2024-01-17 RX ORDER — ACETAMINOPHEN 500 MG
1000 TABLET ORAL
Status: DISCONTINUED | OUTPATIENT
Start: 2024-01-17 | End: 2024-01-19 | Stop reason: HOSPADM

## 2024-01-17 RX ORDER — IBUPROFEN 800 MG/1
800 TABLET ORAL
Status: DISCONTINUED | OUTPATIENT
Start: 2024-01-17 | End: 2024-01-19 | Stop reason: HOSPADM

## 2024-01-17 RX ORDER — TERBUTALINE SULFATE 1 MG/ML
0.25 INJECTION, SOLUTION SUBCUTANEOUS
Status: DISCONTINUED | OUTPATIENT
Start: 2024-01-17 | End: 2024-01-19 | Stop reason: HOSPADM

## 2024-01-17 RX ADMIN — MISOPROSTOL 25 MCG: 100 TABLET ORAL at 22:37

## 2024-01-17 RX ADMIN — INSULIN GLARGINE-YFGN 17 UNITS: 100 INJECTION, SOLUTION SUBCUTANEOUS at 23:31

## 2024-01-17 ASSESSMENT — LIFESTYLE VARIABLES: EVER_SMOKED: YES

## 2024-01-17 ASSESSMENT — PAIN DESCRIPTION - PAIN TYPE
TYPE: ACUTE PAIN

## 2024-01-17 ASSESSMENT — FIBROSIS 4 INDEX: FIB4 SCORE: 0.29

## 2024-01-18 ENCOUNTER — ANESTHESIA EVENT (OUTPATIENT)
Dept: OBGYN | Facility: MEDICAL CENTER | Age: 29
End: 2024-01-18
Payer: COMMERCIAL

## 2024-01-18 ENCOUNTER — ANESTHESIA (OUTPATIENT)
Dept: OBGYN | Facility: MEDICAL CENTER | Age: 29
End: 2024-01-18
Payer: COMMERCIAL

## 2024-01-18 LAB
GLUCOSE BLD STRIP.AUTO-MCNC: 111 MG/DL (ref 65–99)
GLUCOSE BLD STRIP.AUTO-MCNC: 125 MG/DL (ref 65–99)
GLUCOSE BLD STRIP.AUTO-MCNC: 88 MG/DL (ref 65–99)
GLUCOSE BLD STRIP.AUTO-MCNC: 91 MG/DL (ref 65–99)
GLUCOSE BLD STRIP.AUTO-MCNC: 93 MG/DL (ref 65–99)
GLUCOSE BLD STRIP.AUTO-MCNC: 94 MG/DL (ref 65–99)

## 2024-01-18 PROCEDURE — 700105 HCHG RX REV CODE 258: Performed by: OBSTETRICS & GYNECOLOGY

## 2024-01-18 PROCEDURE — 303615 HCHG EPIDURAL/SPINAL ANESTHESIA FOR LABOR

## 2024-01-18 PROCEDURE — 700111 HCHG RX REV CODE 636 W/ 250 OVERRIDE (IP): Mod: JZ

## 2024-01-18 PROCEDURE — 700111 HCHG RX REV CODE 636 W/ 250 OVERRIDE (IP): Performed by: ANESTHESIOLOGY

## 2024-01-18 PROCEDURE — 700111 HCHG RX REV CODE 636 W/ 250 OVERRIDE (IP): Mod: JZ | Performed by: OBSTETRICS & GYNECOLOGY

## 2024-01-18 PROCEDURE — 82962 GLUCOSE BLOOD TEST: CPT | Mod: 91

## 2024-01-18 PROCEDURE — 770002 HCHG ROOM/CARE - OB PRIVATE (112)

## 2024-01-18 RX ORDER — ROPIVACAINE HYDROCHLORIDE 2 MG/ML
INJECTION, SOLUTION EPIDURAL; INFILTRATION; PERINEURAL
Status: COMPLETED
Start: 2024-01-18 | End: 2024-01-18

## 2024-01-18 RX ORDER — ROPIVACAINE HYDROCHLORIDE 2 MG/ML
INJECTION, SOLUTION EPIDURAL; INFILTRATION; PERINEURAL CONTINUOUS
Status: DISCONTINUED | OUTPATIENT
Start: 2024-01-18 | End: 2024-01-19

## 2024-01-18 RX ORDER — EPHEDRINE SULFATE 50 MG/ML
5 INJECTION, SOLUTION INTRAVENOUS
Status: DISCONTINUED | OUTPATIENT
Start: 2024-01-18 | End: 2024-01-19 | Stop reason: HOSPADM

## 2024-01-18 RX ORDER — BUPIVACAINE HYDROCHLORIDE 2.5 MG/ML
INJECTION, SOLUTION EPIDURAL; INFILTRATION; INTRACAUDAL PRN
Status: DISCONTINUED | OUTPATIENT
Start: 2024-01-18 | End: 2024-01-19 | Stop reason: SURG

## 2024-01-18 RX ORDER — SODIUM CHLORIDE, SODIUM LACTATE, POTASSIUM CHLORIDE, AND CALCIUM CHLORIDE .6; .31; .03; .02 G/100ML; G/100ML; G/100ML; G/100ML
1000 INJECTION, SOLUTION INTRAVENOUS
Status: DISCONTINUED | OUTPATIENT
Start: 2024-01-18 | End: 2024-01-19 | Stop reason: HOSPADM

## 2024-01-18 RX ORDER — SODIUM CHLORIDE, SODIUM LACTATE, POTASSIUM CHLORIDE, AND CALCIUM CHLORIDE .6; .31; .03; .02 G/100ML; G/100ML; G/100ML; G/100ML
250 INJECTION, SOLUTION INTRAVENOUS PRN
Status: DISCONTINUED | OUTPATIENT
Start: 2024-01-18 | End: 2024-01-19 | Stop reason: HOSPADM

## 2024-01-18 RX ORDER — BUPIVACAINE HYDROCHLORIDE 2.5 MG/ML
INJECTION, SOLUTION EPIDURAL; INFILTRATION; INTRACAUDAL
Status: COMPLETED
Start: 2024-01-18 | End: 2024-01-18

## 2024-01-18 RX ADMIN — SODIUM CHLORIDE, POTASSIUM CHLORIDE, SODIUM LACTATE AND CALCIUM CHLORIDE: 600; 310; 30; 20 INJECTION, SOLUTION INTRAVENOUS at 10:44

## 2024-01-18 RX ADMIN — BUPIVACAINE HYDROCHLORIDE 3 ML: 2.5 INJECTION, SOLUTION EPIDURAL; INFILTRATION; INTRACAUDAL at 20:17

## 2024-01-18 RX ADMIN — OXYTOCIN 20 MILLI-UNITS/MIN: 10 INJECTION, SOLUTION INTRAMUSCULAR; INTRAVENOUS at 21:28

## 2024-01-18 RX ADMIN — INSULIN GLARGINE-YFGN 17 UNITS: 100 INJECTION, SOLUTION SUBCUTANEOUS at 21:17

## 2024-01-18 RX ADMIN — OXYTOCIN 2 MILLI-UNITS/MIN: 10 INJECTION, SOLUTION INTRAMUSCULAR; INTRAVENOUS at 10:46

## 2024-01-18 RX ADMIN — BUPIVACAINE HYDROCHLORIDE 3 ML: 2.5 INJECTION, SOLUTION EPIDURAL; INFILTRATION; INTRACAUDAL at 20:12

## 2024-01-18 RX ADMIN — FENTANYL CITRATE 50 MCG: 50 INJECTION, SOLUTION INTRAMUSCULAR; INTRAVENOUS at 20:17

## 2024-01-18 RX ADMIN — FENTANYL CITRATE 50 MCG: 50 INJECTION, SOLUTION INTRAMUSCULAR; INTRAVENOUS at 20:12

## 2024-01-18 RX ADMIN — ROPIVACAINE HYDROCHLORIDE: 2 INJECTION, SOLUTION EPIDURAL; INFILTRATION at 20:14

## 2024-01-18 ASSESSMENT — PATIENT HEALTH QUESTIONNAIRE - PHQ9
2. FEELING DOWN, DEPRESSED, IRRITABLE, OR HOPELESS: NOT AT ALL
1. LITTLE INTEREST OR PLEASURE IN DOING THINGS: NOT AT ALL
SUM OF ALL RESPONSES TO PHQ9 QUESTIONS 1 AND 2: 0

## 2024-01-18 ASSESSMENT — PAIN DESCRIPTION - PAIN TYPE
TYPE: ACUTE PAIN

## 2024-01-18 ASSESSMENT — LIFESTYLE VARIABLES
HAVE YOU EVER FELT YOU SHOULD CUT DOWN ON YOUR DRINKING: NO
ALCOHOL_USE: NO

## 2024-01-18 NOTE — PROGRESS NOTES
OB progress note    Checkout received and chart reviewed.  Patient is a 28-year-old G2, P0 admitted at 38 weeks 1 day for induction of labor for insulin-dependent gestational diabetes.  She is status post 1 dose of misoprostol.  She is feeling her contractions but they are not very painful yet. FHT with baseline in the 120s, moderate variability present, accelerations present, decelerations absent.  Tocometer with contractions every 2-4 minutes.  SVE 1/80/-2.  Attempts to place Arredondo catheter balloon unsuccessful.  She was given 2 hours to see if her contraction pattern slowed down.  It did not considerably slow down.  We discussed the risks and benefits of another misoprostol versus starting Pitocin.  She is amenable to start Pitocin.

## 2024-01-18 NOTE — H&P
"Department of Obstetrics and Gynecology  Labor and Delivery History and Physical    Date of Admission: 2024     ID: 28 y.o.  with IUP at 38w.    Primary OB: Anival Barnett M.D.     Attending OB: Kyara Rosa MD    CC: here for my induction    HPI: Chelsea Carrillo is a 28 y.o.  at 38w based on first trim u/s cw LMP; pt of Dr. Barnett's. Preg has been c/b A2 gestational diabetes. Has been seeing [facility] for managment of her insulin and is taking largine and lispro for management. She did have low risk NIPT testing with a negative msafp and normal anatomy scan. Her GBS cx is negative. She presents for IOL due to GDM.      ROS: 10 systems reviewed and negative except as noted above.    Obstetric History    - sab 2022        Past Medical History  Surgical History   Gestational diabetes  Anxiety   Endoscopy  Appendectomy      Gynecologic History  Social History   reg menses prior to pregnancy  no Hx of abnormal pap smears.  no Hx of STIs Tobacco: non  EtOH: non  Street Drugs: none      Medications  Allergies   No current facility-administered medications on file prior to encounter.     Current Outpatient Medications on File Prior to Encounter   Medication Sig Dispense Refill    influenza vaccine quad (FLUZONE QUADRIVALENT) 0.5 ML Suspension Prefilled Syringe injection Inject 0.5 ml into the shoulder, thigh, or buttocks. 0.5 mL 0    hydrocortisone 2.5 % Cream topical cream Apply 1 Application topically 2 times a day. 20 g 0    EPINEPHrine (EPIPEN) 0.3 MG/0.3ML Solution Auto-injector solution for injection INJECT INTO THE MIDDLE OF THE OUTER THIGH, SHOULDER, OR BUTTOCKS AND HOLD FOR 3 SECONDS AS NEEDED FOR SEVERE ALLERGIC REACTION FOR ONE DOSE, THEN CALL 911 IF USED. 2 Each 1    Penicillin g, Tree nuts food allergy, and Pcn [penicillins]       O: /77   Pulse (!) 117   Temp 36.5 °C (97.7 °F) (Temporal)   Resp 16   Ht 1.575 m (5' 2\")   Wt 101 kg (223 lb)   SpO2 94%   BMI 40.79 kg/m²   "   Gen: NAD, AAO  Abd: Gravid, NTTP,Cephalic by [place], No rebound or guarding  Ext: NTTP, trace edema, 2+DPP  Pelvic: SVE FT/long/high per RN    FHT:  baseline 130-140 with mod variability, accels present, no decels  [place]: CTX irreg    Labs:   Hospital Outpatient Visit on 2023   Component Date Value Ref Range Status    Glycohemoglobin 2023 5.8 (H)  4.0 - 5.6 % Final    Comment: Increased risk for diabetes:  5.7 -6.4%  Diabetes:  >6.4%  Glycemic control for adults with diabetes:  <7.0%    The above interpretations are per ADA guidelines.  Diagnosis  of diabetes mellitus on the basis of elevated Hemoglobin A1c  should be confirmed by repeating the Hb A1c test.      Est Avg Glucose 2023 120  mg/dL Final    Comment: The eAG calculation is based on the A1c-Derived Daily Glucose  (ADAG) study.  See the ADA's website for additional information.         Prenatal labs:  Blood Type: AB pos  AST: neg  Rubella: Imm   HbSAg: NR  HIV: NR  RPR: NR  Varicella:    GTT: abnormal  GBS: neg          A/P: Chelsea Carrillo is a 28 y.o.  at 38w gestation, with A2GDM   -  *Admit to L&D  *IV, CBC, T&S on hold  *Labor: will start with cytotec for cervical ripening  *FWB: Reassuring, reactive, Cat I FHT.  cEFM.  *Pain: per pt request  *GDM: will give half of her long acting insulin dose and check bs q4 until active labor    Kyara Rosa M.D.,  2024, 10:53 PM

## 2024-01-18 NOTE — PROGRESS NOTES
0700- Report received from MEGHA Reardon. POC discussed. Patient and FOB at bedside, denies needs or concerns at this time.

## 2024-01-18 NOTE — CARE PLAN
The patient is Stable - Low risk of patient condition declining or worsening    Shift Goals  Clinical Goals: Progress labor safely.  Patient Goals: Healthy mom and baby.  Family Goals: Support.    Progress made toward(s) clinical / shift goals:  Progressing    Problem: Knowledge Deficit - L&D  Goal: Patient and family/caregivers will demonstrate understanding of plan of care, disease process/condition, diagnostic tests and medications  Outcome: Progressing     Problem: Risk for Excess Fluid Volume  Goal: Patient will demonstrate pulse, blood pressure and neurologic signs within expected ranges and without any respiratory complications  Outcome: Progressing       Patient is not progressing towards the following goals:

## 2024-01-18 NOTE — CARE PLAN
The patient is Watcher - Medium risk of patient condition declining or worsening    Shift Goals  Clinical Goals: cervical dilation/effacement  Patient Goals: healthy mom & baby  Family Goals: support    Progress made toward(s) clinical / shift goals:        Problem: Knowledge Deficit - L&D  Goal: Patient and family/caregivers will demonstrate understanding of plan of care, disease process/condition, diagnostic tests and medications  Outcome: Progressing     Problem: Risk for Excess Fluid Volume  Goal: Patient will demonstrate pulse, blood pressure and neurologic signs within expected ranges and without any respiratory complications  Outcome: Progressing       Patient is not progressing towards the following goals:  N/a.

## 2024-01-18 NOTE — PROGRESS NOTES
OB Progress Note    Contractions are tolerable. FHT with baseline in the 130s, moderate variability present, accelerations present, decelerations absent.  Tocometer with contractions every 2-4 minutes.  SVE unchanged at 1/80/-2.  We can continue pitocin at this time. Hopefully she changes her cervix but if she becomes more uncomfortable and desires epidural and she does not change, we could attempt another balloon.

## 2024-01-18 NOTE — PROGRESS NOTES
1954  here at 38 weeks for a scheduled induction for GDM. Pt placed on external US and TOCO. VSS. SVE performed.    Report called to provider. Orders received.   345 Provider updated on pt status.   545 Dr. Rosa to bedside. POC discussed.   0700 Report to Grace C[place]. Care Relinquished.

## 2024-01-19 LAB
ABO GROUP BLD: NORMAL
ALBUMIN SERPL BCP-MCNC: 3 G/DL (ref 3.2–4.9)
ALBUMIN/GLOB SERPL: 1 G/DL
ALP SERPL-CCNC: 114 U/L (ref 30–99)
ALT SERPL-CCNC: 16 U/L (ref 2–50)
ANION GAP SERPL CALC-SCNC: 13 MMOL/L (ref 7–16)
AST SERPL-CCNC: 22 U/L (ref 12–45)
BASOPHILS # BLD AUTO: 0.2 % (ref 0–1.8)
BASOPHILS # BLD: 0.05 K/UL (ref 0–0.12)
BILIRUB SERPL-MCNC: 0.3 MG/DL (ref 0.1–1.5)
BLD GP AB SCN SERPL QL: NORMAL
BUN SERPL-MCNC: 8 MG/DL (ref 8–22)
CALCIUM ALBUM COR SERPL-MCNC: 8.8 MG/DL (ref 8.5–10.5)
CALCIUM SERPL-MCNC: 8 MG/DL (ref 8.5–10.5)
CHLORIDE SERPL-SCNC: 104 MMOL/L (ref 96–112)
CO2 SERPL-SCNC: 17 MMOL/L (ref 20–33)
CREAT SERPL-MCNC: 0.6 MG/DL (ref 0.5–1.4)
CREAT UR-MCNC: 47.56 MG/DL
EOSINOPHIL # BLD AUTO: 0.01 K/UL (ref 0–0.51)
EOSINOPHIL NFR BLD: 0 % (ref 0–6.9)
ERYTHROCYTE [DISTWIDTH] IN BLOOD BY AUTOMATED COUNT: 44.7 FL (ref 35.9–50)
GFR SERPLBLD CREATININE-BSD FMLA CKD-EPI: 125 ML/MIN/1.73 M 2
GLOBULIN SER CALC-MCNC: 3 G/DL (ref 1.9–3.5)
GLUCOSE BLD STRIP.AUTO-MCNC: 141 MG/DL (ref 65–99)
GLUCOSE BLD STRIP.AUTO-MCNC: 76 MG/DL (ref 65–99)
GLUCOSE BLD STRIP.AUTO-MCNC: 91 MG/DL (ref 65–99)
GLUCOSE BLD STRIP.AUTO-MCNC: 94 MG/DL (ref 65–99)
GLUCOSE SERPL-MCNC: 104 MG/DL (ref 65–99)
HCT VFR BLD AUTO: 38.5 % (ref 37–47)
HGB BLD-MCNC: 12.7 G/DL (ref 12–16)
IMM GRANULOCYTES # BLD AUTO: 0.14 K/UL (ref 0–0.11)
IMM GRANULOCYTES NFR BLD AUTO: 0.6 % (ref 0–0.9)
LYMPHOCYTES # BLD AUTO: 0.92 K/UL (ref 1–4.8)
LYMPHOCYTES NFR BLD: 3.8 % (ref 22–41)
MCH RBC QN AUTO: 27.3 PG (ref 27–33)
MCHC RBC AUTO-ENTMCNC: 33 G/DL (ref 32.2–35.5)
MCV RBC AUTO: 82.6 FL (ref 81.4–97.8)
MONOCYTES # BLD AUTO: 0.79 K/UL (ref 0–0.85)
MONOCYTES NFR BLD AUTO: 3.2 % (ref 0–13.4)
NEUTROPHILS # BLD AUTO: 22.62 K/UL (ref 1.82–7.42)
NEUTROPHILS NFR BLD: 92.2 % (ref 44–72)
NRBC # BLD AUTO: 0 K/UL
NRBC BLD-RTO: 0 /100 WBC (ref 0–0.2)
PLATELET # BLD AUTO: 306 K/UL (ref 164–446)
PMV BLD AUTO: 8.7 FL (ref 9–12.9)
POTASSIUM SERPL-SCNC: 3.9 MMOL/L (ref 3.6–5.5)
PROT SERPL-MCNC: 6 G/DL (ref 6–8.2)
PROT UR-MCNC: 26 MG/DL (ref 0–15)
PROT/CREAT UR: 547 MG/G (ref 10–107)
RBC # BLD AUTO: 4.66 M/UL (ref 4.2–5.4)
RH BLD: NORMAL
SODIUM SERPL-SCNC: 134 MMOL/L (ref 135–145)
WBC # BLD AUTO: 24.5 K/UL (ref 4.8–10.8)

## 2024-01-19 PROCEDURE — 86901 BLOOD TYPING SEROLOGIC RH(D): CPT

## 2024-01-19 PROCEDURE — 700111 HCHG RX REV CODE 636 W/ 250 OVERRIDE (IP): Mod: JZ | Performed by: ANESTHESIOLOGY

## 2024-01-19 PROCEDURE — 86850 RBC ANTIBODY SCREEN: CPT

## 2024-01-19 PROCEDURE — 700111 HCHG RX REV CODE 636 W/ 250 OVERRIDE (IP): Performed by: ANESTHESIOLOGY

## 2024-01-19 PROCEDURE — 80053 COMPREHEN METABOLIC PANEL: CPT

## 2024-01-19 PROCEDURE — A9270 NON-COVERED ITEM OR SERVICE: HCPCS | Performed by: ANESTHESIOLOGY

## 2024-01-19 PROCEDURE — 700105 HCHG RX REV CODE 258: Performed by: OBSTETRICS & GYNECOLOGY

## 2024-01-19 PROCEDURE — 160029 HCHG SURGERY MINUTES - 1ST 30 MINS LEVEL 4: Performed by: OBSTETRICS & GYNECOLOGY

## 2024-01-19 PROCEDURE — 160009 HCHG ANES TIME/MIN: Performed by: OBSTETRICS & GYNECOLOGY

## 2024-01-19 PROCEDURE — 700111 HCHG RX REV CODE 636 W/ 250 OVERRIDE (IP): Mod: JZ | Performed by: OBSTETRICS & GYNECOLOGY

## 2024-01-19 PROCEDURE — 160048 HCHG OR STATISTICAL LEVEL 1-5: Performed by: OBSTETRICS & GYNECOLOGY

## 2024-01-19 PROCEDURE — 36415 COLL VENOUS BLD VENIPUNCTURE: CPT

## 2024-01-19 PROCEDURE — C1755 CATHETER, INTRASPINAL: HCPCS | Performed by: OBSTETRICS & GYNECOLOGY

## 2024-01-19 PROCEDURE — 82962 GLUCOSE BLOOD TEST: CPT | Mod: 91

## 2024-01-19 PROCEDURE — 85025 COMPLETE CBC W/AUTO DIFF WBC: CPT

## 2024-01-19 PROCEDURE — 84156 ASSAY OF PROTEIN URINE: CPT

## 2024-01-19 PROCEDURE — A9270 NON-COVERED ITEM OR SERVICE: HCPCS | Performed by: OBSTETRICS & GYNECOLOGY

## 2024-01-19 PROCEDURE — 700102 HCHG RX REV CODE 250 W/ 637 OVERRIDE(OP): Performed by: OBSTETRICS & GYNECOLOGY

## 2024-01-19 PROCEDURE — 59514 CESAREAN DELIVERY ONLY: CPT | Mod: 80 | Performed by: OBSTETRICS & GYNECOLOGY

## 2024-01-19 PROCEDURE — 700101 HCHG RX REV CODE 250: Performed by: ANESTHESIOLOGY

## 2024-01-19 PROCEDURE — 700105 HCHG RX REV CODE 258: Performed by: ANESTHESIOLOGY

## 2024-01-19 PROCEDURE — 160035 HCHG PACU - 1ST 60 MINS PHASE I: Performed by: OBSTETRICS & GYNECOLOGY

## 2024-01-19 PROCEDURE — 160002 HCHG RECOVERY MINUTES (STAT): Performed by: OBSTETRICS & GYNECOLOGY

## 2024-01-19 PROCEDURE — 770002 HCHG ROOM/CARE - OB PRIVATE (112)

## 2024-01-19 PROCEDURE — 86900 BLOOD TYPING SEROLOGIC ABO: CPT

## 2024-01-19 PROCEDURE — 700101 HCHG RX REV CODE 250: Performed by: OBSTETRICS & GYNECOLOGY

## 2024-01-19 PROCEDURE — 82570 ASSAY OF URINE CREATININE: CPT

## 2024-01-19 PROCEDURE — 160041 HCHG SURGERY MINUTES - EA ADDL 1 MIN LEVEL 4: Performed by: OBSTETRICS & GYNECOLOGY

## 2024-01-19 PROCEDURE — 700102 HCHG RX REV CODE 250 W/ 637 OVERRIDE(OP): Performed by: ANESTHESIOLOGY

## 2024-01-19 RX ORDER — BISACODYL 10 MG
10 SUPPOSITORY, RECTAL RECTAL PRN
Status: DISCONTINUED | OUTPATIENT
Start: 2024-01-19 | End: 2024-01-21 | Stop reason: HOSPADM

## 2024-01-19 RX ORDER — ONDANSETRON 2 MG/ML
4 INJECTION INTRAMUSCULAR; INTRAVENOUS
Status: DISCONTINUED | OUTPATIENT
Start: 2024-01-19 | End: 2024-01-19 | Stop reason: HOSPADM

## 2024-01-19 RX ORDER — DIPHENHYDRAMINE HYDROCHLORIDE 50 MG/ML
25 INJECTION INTRAMUSCULAR; INTRAVENOUS EVERY 6 HOURS PRN
Status: ACTIVE | OUTPATIENT
Start: 2024-01-19 | End: 2024-01-20

## 2024-01-19 RX ORDER — CITRIC ACID/SODIUM CITRATE 334-500MG
30 SOLUTION, ORAL ORAL ONCE
Status: COMPLETED | OUTPATIENT
Start: 2024-01-19 | End: 2024-01-19

## 2024-01-19 RX ORDER — CEFAZOLIN SODIUM 1 G/3ML
INJECTION, POWDER, FOR SOLUTION INTRAMUSCULAR; INTRAVENOUS PRN
Status: DISCONTINUED | OUTPATIENT
Start: 2024-01-19 | End: 2024-01-19 | Stop reason: SURG

## 2024-01-19 RX ORDER — EPHEDRINE SULFATE 50 MG/ML
10 INJECTION, SOLUTION INTRAVENOUS
Status: ACTIVE | OUTPATIENT
Start: 2024-01-19 | End: 2024-01-20

## 2024-01-19 RX ORDER — ONDANSETRON 2 MG/ML
4 INJECTION INTRAMUSCULAR; INTRAVENOUS EVERY 4 HOURS PRN
Status: DISCONTINUED | OUTPATIENT
Start: 2024-01-19 | End: 2024-01-19 | Stop reason: HOSPADM

## 2024-01-19 RX ORDER — ONDANSETRON 2 MG/ML
INJECTION INTRAMUSCULAR; INTRAVENOUS PRN
Status: DISCONTINUED | OUTPATIENT
Start: 2024-01-19 | End: 2024-01-19 | Stop reason: SURG

## 2024-01-19 RX ORDER — SODIUM CHLORIDE, SODIUM GLUCONATE, SODIUM ACETATE, POTASSIUM CHLORIDE AND MAGNESIUM CHLORIDE 526; 502; 368; 37; 30 MG/100ML; MG/100ML; MG/100ML; MG/100ML; MG/100ML
500 INJECTION, SOLUTION INTRAVENOUS CONTINUOUS
Status: DISCONTINUED | OUTPATIENT
Start: 2024-01-19 | End: 2024-01-19

## 2024-01-19 RX ORDER — HYDROMORPHONE HYDROCHLORIDE 1 MG/ML
0.5 INJECTION, SOLUTION INTRAMUSCULAR; INTRAVENOUS; SUBCUTANEOUS
Status: DISCONTINUED | OUTPATIENT
Start: 2024-01-19 | End: 2024-01-19 | Stop reason: HOSPADM

## 2024-01-19 RX ORDER — METOCLOPRAMIDE HYDROCHLORIDE 5 MG/ML
10 INJECTION INTRAMUSCULAR; INTRAVENOUS ONCE
Status: COMPLETED | OUTPATIENT
Start: 2024-01-19 | End: 2024-01-19

## 2024-01-19 RX ORDER — SODIUM CHLORIDE, SODIUM GLUCONATE, SODIUM ACETATE, POTASSIUM CHLORIDE AND MAGNESIUM CHLORIDE 526; 502; 368; 37; 30 MG/100ML; MG/100ML; MG/100ML; MG/100ML; MG/100ML
INJECTION, SOLUTION INTRAVENOUS
Status: DISCONTINUED | OUTPATIENT
Start: 2024-01-19 | End: 2024-01-19 | Stop reason: SURG

## 2024-01-19 RX ORDER — HYDROMORPHONE HYDROCHLORIDE 1 MG/ML
0.4 INJECTION, SOLUTION INTRAMUSCULAR; INTRAVENOUS; SUBCUTANEOUS
Status: DISCONTINUED | OUTPATIENT
Start: 2024-01-19 | End: 2024-01-19 | Stop reason: HOSPADM

## 2024-01-19 RX ORDER — DOCUSATE SODIUM 100 MG/1
100 CAPSULE, LIQUID FILLED ORAL 2 TIMES DAILY PRN
Status: DISCONTINUED | OUTPATIENT
Start: 2024-01-19 | End: 2024-01-21 | Stop reason: HOSPADM

## 2024-01-19 RX ORDER — MORPHINE SULFATE 0.5 MG/ML
INJECTION, SOLUTION EPIDURAL; INTRATHECAL; INTRAVENOUS PRN
Status: DISCONTINUED | OUTPATIENT
Start: 2024-01-19 | End: 2024-01-19 | Stop reason: SURG

## 2024-01-19 RX ORDER — METHYLERGONOVINE MALEATE 0.2 MG/ML
0.2 INJECTION INTRAVENOUS ONCE
Status: COMPLETED | OUTPATIENT
Start: 2024-01-19 | End: 2024-01-19

## 2024-01-19 RX ORDER — IBUPROFEN 800 MG/1
800 TABLET ORAL EVERY 8 HOURS
Status: DISCONTINUED | OUTPATIENT
Start: 2024-01-20 | End: 2024-01-21 | Stop reason: HOSPADM

## 2024-01-19 RX ORDER — PHENYLEPHRINE HYDROCHLORIDE 10 MG/ML
INJECTION, SOLUTION INTRAMUSCULAR; INTRAVENOUS; SUBCUTANEOUS PRN
Status: DISCONTINUED | OUTPATIENT
Start: 2024-01-19 | End: 2024-01-19 | Stop reason: HOSPADM

## 2024-01-19 RX ORDER — ACETAMINOPHEN 500 MG
1000 TABLET ORAL EVERY 6 HOURS
Status: COMPLETED | OUTPATIENT
Start: 2024-01-19 | End: 2024-01-20

## 2024-01-19 RX ORDER — DIPHENHYDRAMINE HYDROCHLORIDE 50 MG/ML
25 INJECTION INTRAMUSCULAR; INTRAVENOUS EVERY 6 HOURS PRN
Status: DISCONTINUED | OUTPATIENT
Start: 2024-01-20 | End: 2024-01-21 | Stop reason: HOSPADM

## 2024-01-19 RX ORDER — SODIUM CHLORIDE, SODIUM LACTATE, POTASSIUM CHLORIDE, CALCIUM CHLORIDE 600; 310; 30; 20 MG/100ML; MG/100ML; MG/100ML; MG/100ML
INJECTION, SOLUTION INTRAVENOUS PRN
Status: DISCONTINUED | OUTPATIENT
Start: 2024-01-19 | End: 2024-01-21 | Stop reason: HOSPADM

## 2024-01-19 RX ORDER — EPHEDRINE SULFATE 50 MG/ML
5 INJECTION, SOLUTION INTRAVENOUS
Status: DISCONTINUED | OUTPATIENT
Start: 2024-01-19 | End: 2024-01-19 | Stop reason: HOSPADM

## 2024-01-19 RX ORDER — HYDRALAZINE HYDROCHLORIDE 20 MG/ML
5 INJECTION INTRAMUSCULAR; INTRAVENOUS
Status: DISCONTINUED | OUTPATIENT
Start: 2024-01-19 | End: 2024-01-19 | Stop reason: HOSPADM

## 2024-01-19 RX ORDER — ACETAMINOPHEN 500 MG
1000 TABLET ORAL EVERY 6 HOURS PRN
Status: DISCONTINUED | OUTPATIENT
Start: 2024-01-24 | End: 2024-01-21 | Stop reason: HOSPADM

## 2024-01-19 RX ORDER — OXYCODONE HYDROCHLORIDE 5 MG/1
5 TABLET ORAL EVERY 4 HOURS PRN
Status: ACTIVE | OUTPATIENT
Start: 2024-01-19 | End: 2024-01-20

## 2024-01-19 RX ORDER — HYDROMORPHONE HYDROCHLORIDE 1 MG/ML
0.2 INJECTION, SOLUTION INTRAMUSCULAR; INTRAVENOUS; SUBCUTANEOUS
Status: ACTIVE | OUTPATIENT
Start: 2024-01-19 | End: 2024-01-20

## 2024-01-19 RX ORDER — KETOROLAC TROMETHAMINE 15 MG/ML
15 INJECTION, SOLUTION INTRAMUSCULAR; INTRAVENOUS EVERY 6 HOURS
Status: COMPLETED | OUTPATIENT
Start: 2024-01-19 | End: 2024-01-20

## 2024-01-19 RX ORDER — MEPERIDINE HYDROCHLORIDE 25 MG/ML
12.5 INJECTION INTRAMUSCULAR; INTRAVENOUS; SUBCUTANEOUS
Status: DISCONTINUED | OUTPATIENT
Start: 2024-01-19 | End: 2024-01-19 | Stop reason: HOSPADM

## 2024-01-19 RX ORDER — LIDOCAINE HCL/EPINEPHRINE/PF 2%-1:200K
VIAL (ML) INJECTION PRN
Status: DISCONTINUED | OUTPATIENT
Start: 2024-01-19 | End: 2024-01-19 | Stop reason: SURG

## 2024-01-19 RX ORDER — LABETALOL HYDROCHLORIDE 5 MG/ML
10 INJECTION, SOLUTION INTRAVENOUS
Status: DISCONTINUED | OUTPATIENT
Start: 2024-01-19 | End: 2024-01-21 | Stop reason: HOSPADM

## 2024-01-19 RX ORDER — HYDROMORPHONE HYDROCHLORIDE 1 MG/ML
0.4 INJECTION, SOLUTION INTRAMUSCULAR; INTRAVENOUS; SUBCUTANEOUS
Status: ACTIVE | OUTPATIENT
Start: 2024-01-19 | End: 2024-01-20

## 2024-01-19 RX ORDER — CALCIUM CARBONATE 500 MG/1
1000 TABLET, CHEWABLE ORAL EVERY 6 HOURS PRN
Status: DISCONTINUED | OUTPATIENT
Start: 2024-01-19 | End: 2024-01-21 | Stop reason: HOSPADM

## 2024-01-19 RX ORDER — SODIUM CHLORIDE, SODIUM GLUCONATE, SODIUM ACETATE, POTASSIUM CHLORIDE AND MAGNESIUM CHLORIDE 526; 502; 368; 37; 30 MG/100ML; MG/100ML; MG/100ML; MG/100ML; MG/100ML
1000 INJECTION, SOLUTION INTRAVENOUS ONCE
Status: COMPLETED | OUTPATIENT
Start: 2024-01-19 | End: 2024-01-20

## 2024-01-19 RX ORDER — DIPHENHYDRAMINE HYDROCHLORIDE 50 MG/ML
12.5 INJECTION INTRAMUSCULAR; INTRAVENOUS
Status: DISCONTINUED | OUTPATIENT
Start: 2024-01-19 | End: 2024-01-19 | Stop reason: HOSPADM

## 2024-01-19 RX ORDER — SIMETHICONE 125 MG
125 TABLET,CHEWABLE ORAL 4 TIMES DAILY PRN
Status: DISCONTINUED | OUTPATIENT
Start: 2024-01-19 | End: 2024-01-21 | Stop reason: HOSPADM

## 2024-01-19 RX ORDER — DIPHENHYDRAMINE HYDROCHLORIDE 50 MG/ML
12.5 INJECTION INTRAMUSCULAR; INTRAVENOUS EVERY 6 HOURS PRN
Status: ACTIVE | OUTPATIENT
Start: 2024-01-19 | End: 2024-01-20

## 2024-01-19 RX ORDER — OXYCODONE HYDROCHLORIDE 5 MG/1
5 TABLET ORAL EVERY 4 HOURS PRN
Status: DISCONTINUED | OUTPATIENT
Start: 2024-01-20 | End: 2024-01-21 | Stop reason: HOSPADM

## 2024-01-19 RX ORDER — ONDANSETRON 4 MG/1
4 TABLET, ORALLY DISINTEGRATING ORAL EVERY 6 HOURS PRN
Status: DISCONTINUED | OUTPATIENT
Start: 2024-01-20 | End: 2024-01-21 | Stop reason: HOSPADM

## 2024-01-19 RX ORDER — ONDANSETRON 2 MG/ML
4 INJECTION INTRAMUSCULAR; INTRAVENOUS EVERY 6 HOURS PRN
Status: ACTIVE | OUTPATIENT
Start: 2024-01-19 | End: 2024-01-20

## 2024-01-19 RX ORDER — ACETAMINOPHEN 500 MG
1000 TABLET ORAL EVERY 6 HOURS
Status: DISCONTINUED | OUTPATIENT
Start: 2024-01-20 | End: 2024-01-21 | Stop reason: HOSPADM

## 2024-01-19 RX ORDER — LABETALOL HYDROCHLORIDE 5 MG/ML
5 INJECTION, SOLUTION INTRAVENOUS
Status: DISCONTINUED | OUTPATIENT
Start: 2024-01-19 | End: 2024-01-19 | Stop reason: HOSPADM

## 2024-01-19 RX ORDER — MISOPROSTOL 200 UG/1
800 TABLET ORAL
Status: DISCONTINUED | OUTPATIENT
Start: 2024-01-19 | End: 2024-01-19 | Stop reason: HOSPADM

## 2024-01-19 RX ORDER — IBUPROFEN 800 MG/1
800 TABLET ORAL EVERY 8 HOURS PRN
Status: DISCONTINUED | OUTPATIENT
Start: 2024-01-23 | End: 2024-01-21 | Stop reason: HOSPADM

## 2024-01-19 RX ORDER — HYDROMORPHONE HYDROCHLORIDE 1 MG/ML
0.2 INJECTION, SOLUTION INTRAMUSCULAR; INTRAVENOUS; SUBCUTANEOUS
Status: DISCONTINUED | OUTPATIENT
Start: 2024-01-19 | End: 2024-01-19 | Stop reason: HOSPADM

## 2024-01-19 RX ORDER — OXYCODONE HYDROCHLORIDE 10 MG/1
10 TABLET ORAL EVERY 4 HOURS PRN
Status: DISCONTINUED | OUTPATIENT
Start: 2024-01-20 | End: 2024-01-21 | Stop reason: HOSPADM

## 2024-01-19 RX ORDER — AZITHROMYCIN 500 MG/5ML
500 INJECTION, POWDER, LYOPHILIZED, FOR SOLUTION INTRAVENOUS ONCE
Status: COMPLETED | OUTPATIENT
Start: 2024-01-19 | End: 2024-01-19

## 2024-01-19 RX ORDER — TRANEXAMIC ACID 100 MG/ML
INJECTION, SOLUTION INTRAVENOUS PRN
Status: DISCONTINUED | OUTPATIENT
Start: 2024-01-19 | End: 2024-01-19 | Stop reason: HOSPADM

## 2024-01-19 RX ORDER — OXYCODONE HYDROCHLORIDE 10 MG/1
10 TABLET ORAL EVERY 4 HOURS PRN
Status: ACTIVE | OUTPATIENT
Start: 2024-01-19 | End: 2024-01-20

## 2024-01-19 RX ORDER — DIPHENHYDRAMINE HCL 25 MG
25 TABLET ORAL EVERY 6 HOURS PRN
Status: DISCONTINUED | OUTPATIENT
Start: 2024-01-20 | End: 2024-01-21 | Stop reason: HOSPADM

## 2024-01-19 RX ORDER — ONDANSETRON 2 MG/ML
4 INJECTION INTRAMUSCULAR; INTRAVENOUS EVERY 6 HOURS PRN
Status: DISCONTINUED | OUTPATIENT
Start: 2024-01-20 | End: 2024-01-21 | Stop reason: HOSPADM

## 2024-01-19 RX ADMIN — SODIUM CHLORIDE, SODIUM GLUCONATE, SODIUM ACETATE, POTASSIUM CHLORIDE AND MAGNESIUM CHLORIDE 1000 ML: 526; 502; 368; 37; 30 INJECTION, SOLUTION INTRAVENOUS at 16:34

## 2024-01-19 RX ADMIN — ROPIVACAINE HYDROCHLORIDE: 2 INJECTION, SOLUTION EPIDURAL; INFILTRATION at 13:18

## 2024-01-19 RX ADMIN — ONDANSETRON 4 MG: 2 INJECTION INTRAMUSCULAR; INTRAVENOUS at 07:50

## 2024-01-19 RX ADMIN — PHENYLEPHRINE HYDROCHLORIDE 100 MCG: 10 INJECTION INTRAVENOUS at 17:55

## 2024-01-19 RX ADMIN — ONDANSETRON 4 MG: 2 INJECTION INTRAMUSCULAR; INTRAVENOUS at 17:21

## 2024-01-19 RX ADMIN — SODIUM CHLORIDE, POTASSIUM CHLORIDE, SODIUM LACTATE AND CALCIUM CHLORIDE: 600; 310; 30; 20 INJECTION, SOLUTION INTRAVENOUS at 13:50

## 2024-01-19 RX ADMIN — SODIUM CITRATE AND CITRIC ACID MONOHYDRATE 30 ML: 334; 500 SOLUTION ORAL at 16:47

## 2024-01-19 RX ADMIN — FAMOTIDINE 20 MG: 10 INJECTION, SOLUTION INTRAVENOUS at 16:47

## 2024-01-19 RX ADMIN — METOCLOPRAMIDE 10 MG: 5 INJECTION, SOLUTION INTRAMUSCULAR; INTRAVENOUS at 16:47

## 2024-01-19 RX ADMIN — LIDOCAINE HYDROCHLORIDE,EPINEPHRINE BITARTRATE 5 ML: 20; .005 INJECTION, SOLUTION EPIDURAL; INFILTRATION; INTRACAUDAL; PERINEURAL at 17:08

## 2024-01-19 RX ADMIN — LIDOCAINE HYDROCHLORIDE,EPINEPHRINE BITARTRATE 5 ML: 20; .005 INJECTION, SOLUTION EPIDURAL; INFILTRATION; INTRACAUDAL; PERINEURAL at 17:57

## 2024-01-19 RX ADMIN — SODIUM BICARBONATE 0.5 ML: 84 INJECTION, SOLUTION INTRAVENOUS at 17:17

## 2024-01-19 RX ADMIN — PHENYLEPHRINE HYDROCHLORIDE 100 MCG: 10 INJECTION INTRAVENOUS at 17:30

## 2024-01-19 RX ADMIN — SODIUM BICARBONATE 0.5 ML: 84 INJECTION, SOLUTION INTRAVENOUS at 17:08

## 2024-01-19 RX ADMIN — OXYTOCIN 1000 ML: 10 INJECTION, SOLUTION INTRAMUSCULAR; INTRAVENOUS at 17:46

## 2024-01-19 RX ADMIN — FENTANYL CITRATE 100 MCG: 50 INJECTION, SOLUTION INTRAMUSCULAR; INTRAVENOUS at 17:16

## 2024-01-19 RX ADMIN — CEFAZOLIN 2 G: 1 INJECTION, POWDER, FOR SOLUTION INTRAMUSCULAR; INTRAVENOUS at 17:21

## 2024-01-19 RX ADMIN — SODIUM CHLORIDE, SODIUM GLUCONATE, SODIUM ACETATE, POTASSIUM CHLORIDE AND MAGNESIUM CHLORIDE: 526; 502; 368; 37; 30 INJECTION, SOLUTION INTRAVENOUS at 17:16

## 2024-01-19 RX ADMIN — METHYLERGONOVINE MALEATE 0.2 MG: 0.2 INJECTION, SOLUTION INTRAMUSCULAR; INTRAVENOUS at 18:24

## 2024-01-19 RX ADMIN — ROPIVACAINE HYDROCHLORIDE: 2 INJECTION, SOLUTION EPIDURAL; INFILTRATION at 05:45

## 2024-01-19 RX ADMIN — ACETAMINOPHEN 1000 MG: 500 TABLET ORAL at 21:10

## 2024-01-19 RX ADMIN — LIDOCAINE HYDROCHLORIDE,EPINEPHRINE BITARTRATE 5 ML: 20; .005 INJECTION, SOLUTION EPIDURAL; INFILTRATION; INTRACAUDAL; PERINEURAL at 17:17

## 2024-01-19 RX ADMIN — TRANEXAMIC ACID 1000 MG: 100 INJECTION, SOLUTION INTRAVENOUS at 17:35

## 2024-01-19 RX ADMIN — MORPHINE SULFATE 3 MG: 0.5 INJECTION, SOLUTION EPIDURAL; INTRATHECAL; INTRAVENOUS at 18:16

## 2024-01-19 RX ADMIN — OXYTOCIN 30 MILLI-UNITS/MIN: 10 INJECTION, SOLUTION INTRAMUSCULAR; INTRAVENOUS at 11:31

## 2024-01-19 RX ADMIN — AZITHROMYCIN 500 MG: 500 INJECTION, POWDER, LYOPHILIZED, FOR SOLUTION INTRAVENOUS at 16:38

## 2024-01-19 RX ADMIN — KETOROLAC TROMETHAMINE 15 MG: 15 INJECTION, SOLUTION INTRAMUSCULAR; INTRAVENOUS at 22:18

## 2024-01-19 RX ADMIN — PHENYLEPHRINE HYDROCHLORIDE 100 MCG: 10 INJECTION INTRAVENOUS at 17:32

## 2024-01-19 ASSESSMENT — PAIN DESCRIPTION - PAIN TYPE
TYPE: ACUTE PAIN

## 2024-01-19 ASSESSMENT — PATIENT HEALTH QUESTIONNAIRE - PHQ9
1. LITTLE INTEREST OR PLEASURE IN DOING THINGS: NOT AT ALL
SUM OF ALL RESPONSES TO PHQ9 QUESTIONS 1 AND 2: 0
SUM OF ALL RESPONSES TO PHQ9 QUESTIONS 1 AND 2: 0
2. FEELING DOWN, DEPRESSED, IRRITABLE, OR HOPELESS: NOT AT ALL
2. FEELING DOWN, DEPRESSED, IRRITABLE, OR HOPELESS: NOT AT ALL
1. LITTLE INTEREST OR PLEASURE IN DOING THINGS: NOT AT ALL

## 2024-01-19 NOTE — PROGRESS NOTES
0030- received report from MEGHA Montgomery. , 38/2, IOL for GDM.  Pt currently has marinelli balloon in place.  MD has plans to recheck at 3am.  Pt comfortable with epidural  - 0045- pulled on marinelli balloon, no movement, blood inside marinelli tube and coming out port.   0230- MD at bedside to eval balloon.    0200- pull on balloon, no movement  SVE: cervix around 4cm... will leave balloon in for now until it comes out, orders to continue to pull on it.  0330- pull on balloon, slight movement  0525- pull on balloon, slight movement  0600- Dr. Man at bedside to eval  - removed balloon.  SVE: /-2, AROM, bloody/cl fluid  -IUPC placed  - Discussed POC

## 2024-01-19 NOTE — PROGRESS NOTES
OB Progress Note    After not changing her cervix all day, she decided to proceed with getting an epidural and attempting another balloon.  She is comfortable now with her epidural.  FHT with baseline in the 120s.  Moderate variability present.  Accelerations present.  Decelerations absent.  Tocometer with contractions every 2 minutes.  SVE unchanged.  Arredondo balloon placed with 60 cc on traction.  Continue Pitocin.

## 2024-01-19 NOTE — ANESTHESIA PROCEDURE NOTES
Epidural Block    Date/Time: 1/18/2024 8:07 PM    Performed by: Ryan Shi M.D.  Authorized by: Ryan Shi M.D.    Patient Location:  OB  Start Time:  1/18/2024 8:07 PM  End Time:  1/18/2024 8:12 PM  Reason for Block: labor analgesia    patient identified, IV checked, site marked, risks and benefits discussed, surgical consent, monitors and equipment checked and pre-op evaluation    Patient Position:  Sitting  Prep: ChloraPrep, patient draped and sterile technique    Monitoring:  Blood pressure, continuous pulse oximetry and heart rate  Approach:  Midline  Location:  L3-L4  Injection Technique:  AMY saline  Skin infiltration:  Lidocaine  Strength:  1%  Dose:  3ml  Needle Type:  Tuohy  Needle Gauge:  17 G  Needle Length:  3.5 in  Loss of resistance::  6  Catheter Size:  19 G  Catheter at Skin Depth:  13  Test Dose Result:  Negative   Success on 1st pass  No heme/CSF  Catheter threaded easily  Negative aspiration  No evidence of complications  Patient comfortable after loading dose

## 2024-01-19 NOTE — CARE PLAN
The patient is Stable - Low risk of patient condition declining or worsening    Shift Goals  Clinical Goals: cervical dilation  Patient Goals: healthy mom and baby  Family Goals: support    Progress made toward(s) clinical / shift goals:    Problem: Knowledge Deficit - L&D  Goal: Patient and family/caregivers will demonstrate understanding of plan of care, disease process/condition, diagnostic tests and medications  Outcome: Progressing     Problem: Risk for Injury  Goal: Patient and fetus will be free of preventable injury/complications  Outcome: Progressing     Problem: Pain  Goal: Patient's pain will be alleviated or reduced to the patient’s comfort goal  Outcome: Progressing       Patient is not progressing towards the following goals:

## 2024-01-19 NOTE — PROGRESS NOTES
OB Progress Note    Doing well.  FHT with baseline in the 130s.  Moderate variability present.  Accelerations present.  Decelerations absent.  Tocometer with contractions every 2-4 minutes.  Arredondo still in cervix and cervix feels ~4cm around it. Will keep in place for now. Continue pitocin.

## 2024-01-19 NOTE — PROGRESS NOTES
0700 Report received from Justin JIANG RN. POC for labor discussed. Questions answered. Care assumed.     1105 Oxytocin IV pump stopped allowing titration at 30mili units. Order confirmed to continue triturating oxytocin up to 40mili units. Escalated to charge and confirmed there is no policy limit. Called clinical pharmacy to find other options for titration. Not able to exceed pumps hard stop.    1122 See flowsheet for provider notification. Dr. Franco aware oxytocin is limited to 30 fannie units due to IV pump regulations.      1220 Dr. Franco at bedside for SVE. Discussed labor progression and options for management.  section discussed after 12hr elliot with no progression which would be 1800. Pt voiced possibly wanting to proceed before that time frame. Pt verbalized understanding and will notify RN of decision.     1435 FS blood sugar performed, result 91.     1625 Decision for  section.    1714 Pt transferred from labor room to OR 2 via labor bed.    1745 Delivery of viable female infant. Nuchal X1. Apgars 8/8. .     1830 Pt transferred from OR 2 to PACU 3.    1831 Arrived in PACU 3.    1900 Report given to Priscila WEBER RN. POC for recovery discussed. Care relinquished.

## 2024-01-19 NOTE — PROGRESS NOTES
OB Progress Note    No complaints. She got in a good nap.  FHT with baseline in the 120s.  Moderate variability present.  Accelerations present.  Decelerations absent.  Tocometer with contractions every 2 minutes.  Arredondo removed. SVE 5/80/-2. Discussed r/b of AROM and IUPC placement. She is amenable. AROM clear. IUPC placed. Continue pitocin. Anticipate vaginal delivery.

## 2024-01-19 NOTE — PROGRESS NOTES
"Obstetrics & Gynecology Labor Progress Note    Date of Service  2024    Subjective  29 y.o. female admitted 2024 with IOL for GDMA2. Getting tired of labor and states she would prefer a CS at this point.    Active Problems:    * No active hospital problems. *  Resolved Problems:    * No resolved hospital problems. *      Objective  /79   Pulse 90   Temp 36.4 °C (97.6 °F) (Temporal)   Resp 16   Ht 1.575 m (5' 2\")   Wt 101 kg (223 lb)   SpO2 91%   BMI 40.79 kg/m²   Sterile Vaginal Exam: Dilation: 5 Effacement (%): 80 Fetal Station: -2  Fetal Heart Tracing: Baseline Rate: 130 bpm Variability: 6-25 Accelerations: Present Decels: none  Mode: External US   category 1  [place]: Mode: Internal IUPC Contraction Frequency: 3 - 5  Pitocin at 30, unable to increase further due to limitations on the pump settings without taking off safety features  MVUs varying between  depending on 2 or 3 ctxs in 10 min    Glucose fingerstick 76 at 10:33    Assessment  29 y.o.  at 38w2d here with IOL for GDMA2 in latent labor    Plan  Discussed now 6hrs post rupture with no cervical change. Discussed that 70% of women would be in active labor now, and an additional 20% would go into labor 6-12 hrs after rupture. Maternal and fetal status reassuring so no indications for CS from a medical standpoint.    Discussed risks of CS including bleeding, infection, DVT risk, scar tissue formation, and in future pregnancies abnormal placentation and uterine rupture. She only plans on this pregnancy and no future pregnancies. Advised her that she always has option to elect for CS if she understands the risks. She will consider it.    If no change in cervix 12 hrs post AROM, will offer CS for failed IOL.    Nahomi Franco M.D.  "

## 2024-01-19 NOTE — CARE PLAN
The patient is Watcher - Medium risk of patient condition declining or worsening    Shift Goals  Clinical Goals: labor progression  Patient Goals: healthy mom/baby  Family Goals: emotional support    Progress made toward(s) clinical / shift goals:    Problem: Knowledge Deficit - L&D  Goal: Patient and family/caregivers will demonstrate understanding of plan of care, disease process/condition, diagnostic tests and medications  Outcome: Progressing     Problem: Risk for Excess Fluid Volume  Goal: Patient will demonstrate pulse, blood pressure and neurologic signs within expected ranges and without any respiratory complications  Outcome: Progressing     Problem: Psychosocial - L&D  Goal: Patient's level of anxiety will decrease  Outcome: Progressing     Problem: Risk for Venous Thromboembolism (VTE)  Goal: VTE prevention measures will be implemented and patient will remain free from VTE  Outcome: Progressing     Problem: Risk for Infection and Impaired Wound Healing  Goal: Patient will remain free from infection  Outcome: Progressing     Problem: Risk for Fluid Imbalance  Goal: Patient's fluid volume balance will be maintained or improve  Outcome: Progressing     Problem: Risk for Injury  Goal: Patient and fetus will be free of preventable injury/complications  Outcome: Progressing  Note: External FHT monitor in place to assess FHT for signs of fetal well being. IUPC in place for oxytocin titration for uterine and fetal wellbeing.      Problem: Pain  Goal: Patient's pain will be alleviated or reduced to the patient’s comfort goal  Outcome: Progressing  Note: Discussed pharmacological and non pharmacological pain management options. Discussed dermatone assessment to identify windows of coverage. Assisted pt to identify in non pharmacological pain management options she would like to participate in through exploring options.      Problem: Discharge Barriers/Planning  Goal: Patient's continuum of care needs are  met  Outcome: Progressing     Problem: Skin Integrity  Goal: Skin integrity is maintained or improved  Outcome: Progressing       Patient is not progressing towards the following goals:

## 2024-01-19 NOTE — PROGRESS NOTES
"Obstetrics & Gynecology Labor Progress Note    Date of Service  2024    Subjective  29 y.o. female admitted 2024 with IOL for GDMA2. Signout received from Dr. Man. She has received 1 dose cytotec, followed by pitocin that started 24 hrs ago. She received a marinelli balloon last night that was in for 12 hrs and came out at 6am this morning.  Pt is relatively comfortable with the epidural, feeling some contractions. She is tired and is \"ready for the baby to come out.\"    Active Problems:    * No active hospital problems. *  Resolved Problems:    * No resolved hospital problems. *      Objective  /79   Pulse 90   Temp 36.2 °C (97.2 °F) (Temporal)   Resp 16   Ht 1.575 m (5' 2\")   Wt 101 kg (223 lb)   SpO2 91%   BMI 40.79 kg/m²   Sterile Vaginal Exam: Dilation: 5 Effacement (%): 80 Fetal Station: -2  Fetal Heart Tracing: Baseline Rate: 140 bpm Variability: 6-25 Accelerations: Present Decels: none  Mode: External US   category 1  [place]: Mode: Internal IUPC Contraction Frequency: 2 - 4  MVUs just at 200 now  Pitocin at 30    Glucose fingerstick 141 at 6:39am    Assessment  29 y.o.  at 38w2d here with IOL for GDMA2 in latent labor    Plan  Continue pitocin induction, await transition from latent to active labor  Continue q 4 hr fingerstick glucose while in latent labor    Nahomi Franco M.D.  "

## 2024-01-19 NOTE — ANESTHESIA PREPROCEDURE EVALUATION
Date: 01/18/24  Procedure: Labor Epidural       Douglas pregnancy at 38 weeks gestation  BMI 40    Relevant Problems   ANESTHESIA   (positive) DANISHA (obstructive sleep apnea)      PULMONARY   (positive) Mild intermittent asthma without complication       Physical Exam    Airway   Mallampati: II  TM distance: >3 FB  Neck ROM: full       Cardiovascular - normal exam  Rhythm: regular  Rate: normal  (-) murmur     Dental - normal exam           Pulmonary - normal exam  Breath sounds clear to auscultation     Abdominal    Neurological - normal exam                   Anesthesia Plan    ASA 3   ASA physical status 3 criteria: morbid obesity - BMI greater than or equal to 40    Plan - epidural   Neuraxial block will be labor analgesia                  Pertinent diagnostic labs and testing reviewed    Informed Consent:    Anesthetic plan and risks discussed with patient.

## 2024-01-20 LAB
ERYTHROCYTE [DISTWIDTH] IN BLOOD BY AUTOMATED COUNT: 46 FL (ref 35.9–50)
HCT VFR BLD AUTO: 34.3 % (ref 37–47)
HGB BLD-MCNC: 11.3 G/DL (ref 12–16)
MCH RBC QN AUTO: 27.5 PG (ref 27–33)
MCHC RBC AUTO-ENTMCNC: 32.9 G/DL (ref 32.2–35.5)
MCV RBC AUTO: 83.5 FL (ref 81.4–97.8)
PLATELET # BLD AUTO: 275 K/UL (ref 164–446)
PMV BLD AUTO: 9.1 FL (ref 9–12.9)
RBC # BLD AUTO: 4.11 M/UL (ref 4.2–5.4)
WBC # BLD AUTO: 18 K/UL (ref 4.8–10.8)

## 2024-01-20 PROCEDURE — 36415 COLL VENOUS BLD VENIPUNCTURE: CPT

## 2024-01-20 PROCEDURE — 770002 HCHG ROOM/CARE - OB PRIVATE (112)

## 2024-01-20 PROCEDURE — A9270 NON-COVERED ITEM OR SERVICE: HCPCS | Performed by: ANESTHESIOLOGY

## 2024-01-20 PROCEDURE — 700102 HCHG RX REV CODE 250 W/ 637 OVERRIDE(OP): Performed by: OBSTETRICS & GYNECOLOGY

## 2024-01-20 PROCEDURE — 700111 HCHG RX REV CODE 636 W/ 250 OVERRIDE (IP): Performed by: ANESTHESIOLOGY

## 2024-01-20 PROCEDURE — 85027 COMPLETE CBC AUTOMATED: CPT

## 2024-01-20 PROCEDURE — 700102 HCHG RX REV CODE 250 W/ 637 OVERRIDE(OP): Performed by: ANESTHESIOLOGY

## 2024-01-20 PROCEDURE — A9270 NON-COVERED ITEM OR SERVICE: HCPCS | Performed by: OBSTETRICS & GYNECOLOGY

## 2024-01-20 RX ORDER — FLUTICASONE PROPIONATE AND SALMETEROL 250; 50 UG/1; UG/1
1 POWDER RESPIRATORY (INHALATION) 2 TIMES DAILY
Status: DISCONTINUED | OUTPATIENT
Start: 2024-01-20 | End: 2024-01-20

## 2024-01-20 RX ORDER — ALBUTEROL SULFATE 90 UG/1
1-2 AEROSOL, METERED RESPIRATORY (INHALATION) EVERY 6 HOURS PRN
Status: DISCONTINUED | OUTPATIENT
Start: 2024-01-20 | End: 2024-01-21 | Stop reason: HOSPADM

## 2024-01-20 RX ADMIN — DOCUSATE SODIUM 100 MG: 100 CAPSULE, LIQUID FILLED ORAL at 20:14

## 2024-01-20 RX ADMIN — ACETAMINOPHEN 1000 MG: 500 TABLET ORAL at 20:13

## 2024-01-20 RX ADMIN — SERTRALINE 25 MG: 50 TABLET, FILM COATED ORAL at 11:08

## 2024-01-20 RX ADMIN — ACETAMINOPHEN 1000 MG: 500 TABLET ORAL at 17:34

## 2024-01-20 RX ADMIN — KETOROLAC TROMETHAMINE 15 MG: 15 INJECTION, SOLUTION INTRAMUSCULAR; INTRAVENOUS at 17:36

## 2024-01-20 RX ADMIN — SERTRALINE 50 MG: 50 TABLET, FILM COATED ORAL at 09:35

## 2024-01-20 RX ADMIN — KETOROLAC TROMETHAMINE 15 MG: 15 INJECTION, SOLUTION INTRAMUSCULAR; INTRAVENOUS at 05:05

## 2024-01-20 RX ADMIN — KETOROLAC TROMETHAMINE 15 MG: 15 INJECTION, SOLUTION INTRAMUSCULAR; INTRAVENOUS at 11:06

## 2024-01-20 RX ADMIN — ACETAMINOPHEN 1000 MG: 500 TABLET ORAL at 05:05

## 2024-01-20 RX ADMIN — DOCUSATE SODIUM 100 MG: 100 CAPSULE, LIQUID FILLED ORAL at 06:36

## 2024-01-20 RX ADMIN — ACETAMINOPHEN 1000 MG: 500 TABLET ORAL at 11:07

## 2024-01-20 RX ADMIN — IBUPROFEN 800 MG: 800 TABLET, FILM COATED ORAL at 20:14

## 2024-01-20 ASSESSMENT — EDINBURGH POSTNATAL DEPRESSION SCALE (EPDS)
I HAVE BEEN SO UNHAPPY THAT I HAVE HAD DIFFICULTY SLEEPING: NOT AT ALL
I HAVE BEEN ANXIOUS OR WORRIED FOR NO GOOD REASON: HARDLY EVER
I HAVE LOOKED FORWARD WITH ENJOYMENT TO THINGS: AS MUCH AS I EVER DID
THE THOUGHT OF HARMING MYSELF HAS OCCURRED TO ME: NEVER
I HAVE BLAMED MYSELF UNNECESSARILY WHEN THINGS WENT WRONG: NO, NEVER
I HAVE FELT SCARED OR PANICKY FOR NO GOOD REASON: NO, NOT AT ALL
I HAVE FELT SAD OR MISERABLE: NO, NOT AT ALL
I HAVE BEEN SO UNHAPPY THAT I HAVE BEEN CRYING: NO, NEVER
I HAVE BEEN ABLE TO LAUGH AND SEE THE FUNNY SIDE OF THINGS: AS MUCH AS I ALWAYS COULD
THINGS HAVE BEEN GETTING ON TOP OF ME: NO, MOST OF THE TIME I HAVE COPED QUITE WELL

## 2024-01-20 ASSESSMENT — PAIN DESCRIPTION - PAIN TYPE
TYPE: SURGICAL PAIN
TYPE: ACUTE PAIN
TYPE: ACUTE PAIN

## 2024-01-20 NOTE — PROGRESS NOTES
1900 Report received from Mariana CLEVELAND   Per previous RN, okay to d/c insulin, and move to postpartum.  2030 report given to Ira CLEVELAND, care relinquished.

## 2024-01-20 NOTE — PROGRESS NOTES
PostPartum day #1:    Subjective:   Pt reports feeling well, pain is tolerable with pain medication. Tolerating clears. Voiding spontaneously. Is passing flatus.  Reports her bleeding is ok.    Breast feeding.: yes, reports this is going well.      24hr VS:  Temp:  [36 °C (96.8 °F)-36.8 °C (98.3 °F)] 36 °C (96.8 °F)  Pulse:  [] 78  Resp:  [16-18] 18  BP: ()/(50-82) 103/60  SpO2:  [93 %-98 %] 96 %        Intake/Output Summary (Last 24 hours) at 1/20/2024 0723  Last data filed at 1/20/2024 0717  Gross per 24 hour   Intake 5854.39 ml   Output 3420 ml   Net 2434.39 ml     gen: AAO, NAD  Abd: soft, ND, appropriately tender to palpation, no rebound/guarding, +BS; fundus palpable below umbilcus   Incision: mepilex in place, clean/dry/intact  Ext: NT, 1+ edema bilaterally    Meds:   Current Facility-Administered Medications:     sertraline (Zoloft) tablet 50 mg, 50 mg, Oral, DAILY, Nahomi Franco M.D.    acetaminophen (Tylenol) tablet 1,000 mg, 1,000 mg, Oral, Q6HR, Clare Arias M.D., 1,000 mg at 01/20/24 0505    ketorolac (Toradol) 15 MG/ML injection 15 mg, 15 mg, Intravenous, Q6HR, Clare Arias M.D., 15 mg at 01/20/24 0505    oxyCODONE immediate-release (Roxicodone) tablet 5 mg, 5 mg, Oral, Q4HRS PRN, Clare Arias M.D.    oxyCODONE immediate release (Roxicodone) tablet 10 mg, 10 mg, Oral, Q4HRS PRN, Clare Arias M.D.    HYDROmorphone (Dilaudid) injection 0.2 mg, 0.2 mg, Intravenous, Q2HRS PRN, Clare Arias M.D.    HYDROmorphone (Dilaudid) injection 0.4 mg, 0.4 mg, Intravenous, Q2HRS PRN, Clare Arias M.D.    ePHEDrine injection 10 mg, 10 mg, Intravenous, Q5 MIN PRN, Clare Arias M.D.    ondansetron (Zofran) syringe/vial injection 4 mg, 4 mg, Intravenous, Q6HRS PRN, Clare Arias M.D.    diphenhydrAMINE (Benadryl) injection 12.5 mg, 12.5 mg, Intravenous, Q6HRS PRN, Clare Arias M.D.    diphenhydrAMINE (Benadryl) injection 12.5 mg,  12.5 mg, Intravenous, Q6HRS PRN **OR** diphenhydrAMINE (Benadryl) injection 25 mg, 25 mg, Intravenous, Q6HRS PRN **OR** naloxone HCl (Narcan) 20 mg in  mL infusion, 0.4 mg/hr, Intravenous, Q6HRS PRN, Clare Arias M.D.    lactated ringers infusion, , Intravenous, PRN, Nahomi Franco M.D.    ibuprofen (Motrin) tablet 800 mg, 800 mg, Oral, Q8HRS **FOLLOWED BY** [START ON 1/23/2024] ibuprofen (Motrin) tablet 800 mg, 800 mg, Oral, Q8HRS PRN, Nahomi Franco M.D.    acetaminophen (Tylenol) tablet 1,000 mg, 1,000 mg, Oral, Q6HRS **FOLLOWED BY** [START ON 1/24/2024] acetaminophen (Tylenol) tablet 1,000 mg, 1,000 mg, Oral, Q6HRS PRN, Nahomi Franco M.D.    oxyCODONE immediate-release (Roxicodone) tablet 5 mg, 5 mg, Oral, Q4HRS PRN, Nahomi Franco M.D.    oxyCODONE immediate release (Roxicodone) tablet 10 mg, 10 mg, Oral, Q4HRS PRN, Nahomi Franco M.D.    ondansetron (Zofran) syringe/vial injection 4 mg, 4 mg, Intravenous, Q6HRS PRN **OR** ondansetron (Zofran ODT) dispertab 4 mg, 4 mg, Oral, Q6HRS PRN, Nahomi Franco M.D.    diphenhydrAMINE (Benadryl) tablet/capsule 25 mg, 25 mg, Oral, Q6HRS PRN **OR** diphenhydrAMINE (Benadryl) injection 25 mg, 25 mg, Intravenous, Q6HRS PRN, Nahomi Franco M.D.    docusate sodium (Colace) capsule 100 mg, 100 mg, Oral, BID PRN, Nahomi Franco M.D., 100 mg at 01/20/24 0636    labetalol (Normodyne/Trandate) injection 10 mg, 10 mg, Intravenous, Q10 MIN PRN, Nahomi Franco M.D.    bisacodyl (Dulcolax) suppository 10 mg, 10 mg, Rectal, PRN, Nahomi Franco M.D.    magnesium hydroxide (Milk Of Magnesia) suspension 30 mL, 30 mL, Oral, Q6HRS PRN, Nahomi Franco M.D.    simethicone (Mylicon) chewable tablet 125 mg, 125 mg, Oral, 4X/DAY PRN, Nahomi Franco M.D.    calcium carbonate (Tums) chewable tab 1,000 mg, 1,000 mg, Oral, Q6HRS PRN, Nahomi Franco M.D.    Lab:   Recent Results (from the past 48 hour(s))   POCT glucose device results    Collection Time: 01/18/24 10:59 AM   Result Value Ref Range    POC Glucose,  Blood 111 (H) 65 - 99 mg/dL   POCT glucose device results    Collection Time: 01/18/24  1:38 PM   Result Value Ref Range    POC Glucose, Blood 93 65 - 99 mg/dL   POCT glucose device results    Collection Time: 01/18/24  5:20 PM   Result Value Ref Range    POC Glucose, Blood 125 (H) 65 - 99 mg/dL   POCT glucose device results    Collection Time: 01/18/24  9:14 PM   Result Value Ref Range    POC Glucose, Blood 91 65 - 99 mg/dL   POCT glucose device results    Collection Time: 01/19/24  2:42 AM   Result Value Ref Range    POC Glucose, Blood 94 65 - 99 mg/dL   POCT glucose device results    Collection Time: 01/19/24  6:39 AM   Result Value Ref Range    POC Glucose, Blood 141 (H) 65 - 99 mg/dL   POCT glucose device results    Collection Time: 01/19/24 10:33 AM   Result Value Ref Range    POC Glucose, Blood 76 65 - 99 mg/dL   COD - Adult (Type and Screen)    Collection Time: 01/19/24  1:28 PM   Result Value Ref Range    ABO Grouping Only AB     Rh Grouping Only POS     Antibody Screen-Cod NEG    POCT glucose device results    Collection Time: 01/19/24  2:35 PM   Result Value Ref Range    POC Glucose, Blood 91 65 - 99 mg/dL   PROTEIN/CREAT RATIO URINE    Collection Time: 01/19/24  7:20 PM   Result Value Ref Range    Total Protein, Urine 26.0 (H) 0.0 - 15.0 mg/dL    Creatinine, Random Urine 47.56 mg/dL    Protein Creatinine Ratio 547 (H) 10 - 107 mg/g   CBC WITH DIFFERENTIAL    Collection Time: 01/19/24  7:38 PM   Result Value Ref Range    WBC 24.5 (H) 4.8 - 10.8 K/uL    RBC 4.66 4.20 - 5.40 M/uL    Hemoglobin 12.7 12.0 - 16.0 g/dL    Hematocrit 38.5 37.0 - 47.0 %    MCV 82.6 81.4 - 97.8 fL    MCH 27.3 27.0 - 33.0 pg    MCHC 33.0 32.2 - 35.5 g/dL    RDW 44.7 35.9 - 50.0 fL    Platelet Count 306 164 - 446 K/uL    MPV 8.7 (L) 9.0 - 12.9 fL    Neutrophils-Polys 92.20 (H) 44.00 - 72.00 %    Lymphocytes 3.80 (L) 22.00 - 41.00 %    Monocytes 3.20 0.00 - 13.40 %    Eosinophils 0.00 0.00 - 6.90 %    Basophils 0.20 0.00 - 1.80 %     Immature Granulocytes 0.60 0.00 - 0.90 %    Nucleated RBC 0.00 0.00 - 0.20 /100 WBC    Neutrophils (Absolute) 22.62 (H) 1.82 - 7.42 K/uL    Lymphs (Absolute) 0.92 (L) 1.00 - 4.80 K/uL    Monos (Absolute) 0.79 0.00 - 0.85 K/uL    Eos (Absolute) 0.01 0.00 - 0.51 K/uL    Baso (Absolute) 0.05 0.00 - 0.12 K/uL    Immature Granulocytes (abs) 0.14 (H) 0.00 - 0.11 K/uL    NRBC (Absolute) 0.00 K/uL   Comp Metabolic Panel    Collection Time: 24  7:38 PM   Result Value Ref Range    Sodium 134 (L) 135 - 145 mmol/L    Potassium 3.9 3.6 - 5.5 mmol/L    Chloride 104 96 - 112 mmol/L    Co2 17 (L) 20 - 33 mmol/L    Anion Gap 13.0 7.0 - 16.0    Glucose 104 (H) 65 - 99 mg/dL    Bun 8 8 - 22 mg/dL    Creatinine 0.60 0.50 - 1.40 mg/dL    Calcium 8.0 (L) 8.5 - 10.5 mg/dL    Correct Calcium 8.8 8.5 - 10.5 mg/dL    AST(SGOT) 22 12 - 45 U/L    ALT(SGPT) 16 2 - 50 U/L    Alkaline Phosphatase 114 (H) 30 - 99 U/L    Total Bilirubin 0.3 0.1 - 1.5 mg/dL    Albumin 3.0 (L) 3.2 - 4.9 g/dL    Total Protein 6.0 6.0 - 8.2 g/dL    Globulin 3.0 1.9 - 3.5 g/dL    A-G Ratio 1.0 g/dL   ESTIMATED GFR    Collection Time: 24  7:38 PM   Result Value Ref Range    GFR (CKD-EPI) 125 >60 mL/min/1.73 m 2     A/P: 29 y.o.  POD 1 s/p primary  delivery @ 38w2d for failed IOL, GDMA2.  - Doing well postpartum, meeting all postop milestones; encouraged ambulation, cont routine postop care  - infant: doing well at bedside, pt is BFing  - Rh+/RI  - ppx: SCDs    Nahomi Franco MD  [facility]

## 2024-01-20 NOTE — DISCHARGE PLANNING
Discharge Planning Assessment Post Partum    Reason for Referral: Hx of anxiety   Address: [address] [place] [address]  Type of Living Situation:Stable   Mom Diagnosis: Postpartum  Baby Diagnosis:    Primary Language: English     Name of Baby: Cassi Carrillo  Father of the Baby: Aristeo Carrillo  Involved in baby’s care? Yes  Contact Information: [phone]    Prenatal Care: Yes  Mom's PCP: Mary  PCP for new baby:[facility]     Support System: Yes  Coping/Bonding between mother & baby: MOB coping/bonding   Source of Feeding: Breast  Supplies for Infant: Yes    Mom's Insurance: HTH  Baby Covered on Insurance:Yes  Mother Employed/School: Yes  Other children in the home/names & ages:  First    Financial Hardship/Income: None   Mom's Mental status: Stable and alert   Services used prior to admit: None    CPS History: None  Psychiatric History: Hx of anxiety  Domestic Violence History: None  Drug/ETOH History: None    Resources Provided:  provided postpartum depression resources   Referrals Made: None     Clearance for Discharge: Baby is cleared to discharge with MOB and FOB when medically cleared

## 2024-01-20 NOTE — ANESTHESIA POSTPROCEDURE EVALUATION
Patient: Chelsea Carrillo    Procedure Summary       Date: 24 Room / Location: [facility] OR 02 / SURGERY LABOR AND DELIVERY    Anesthesia Start:  Anesthesia Stop: 24 1835    Procedure:  SECTION, PRIMARY (Abdomen) Diagnosis: (38.2 WEEKS FAILURE TO PROGRESS ARREST OF DIALATION)    Surgeons: Nahomi Franco M.D. Responsible Provider: Clare Arias M.D.    Anesthesia Type: epidural ASA Status: 2            Final Anesthesia Type: epidural  Last vitals  BP 91/52   Temp 97.9   Pulse   99   Resp   18    SpO2   96%     Anesthesia Post Evaluation    Patient location during evaluation: PACU  Patient participation: complete - patient participated  Level of consciousness: awake and alert    Airway patency: patent  Anesthetic complications: no  Cardiovascular status: hemodynamically stable  Respiratory status: acceptable  Hydration status: euvolemic    PONV: none    patient able to participate, but full recovery from regional anesthesia has not occurred and is not expected within the stipulated timeframe for the completion of the evaluation      No notable events documented.     Nurse Pain Score: 8 (NPRS)

## 2024-01-20 NOTE — LACTATION NOTE
This note was copied from a baby's chart.  Physical assessment of martina Ye and Erica provided. Introduction to basics of initiating breastfeeding shown at this time to include both maternal and infant posture, angle of latch, hand expression and normal  feeding patterns and expectations.    Aristeo, baby's father downloaded the [employer] uriel and parents had already viewed the [employer] video last night.    Encouraged mother to keep baby skin to skin (when temperature stable) and observe for feeding cues, while taking into consideration other normal bodily functions that may prohibit baby wanting to latch or feed at times. Matrina Ye is spitty at this time so we burped her.

## 2024-01-20 NOTE — PROGRESS NOTES
Received report and assumed care of patient. Parent was already educated on emergency pull cord and safe sleep. Patient is awake. No needs at this time. Will call with any needs. Abdominal binder was given to the patient. Will monitor.

## 2024-01-20 NOTE — PROGRESS NOTES
"Obstetrics & Gynecology Labor Progress Note    Date of Service  2024    Subjective  29 y.o. female admitted 2024 with IOL for GDMA2. Requesting CS    Active Problems:    * No active hospital problems. *  Resolved Problems:    * No resolved hospital problems. *      Objective  /64   Pulse 99   Temp 36.2 °C (97.2 °F) (Temporal)   Resp 18   Ht 1.575 m (5' 2\")   Wt 101 kg (223 lb)   SpO2 91%   BMI 40.79 kg/m²   Sterile Vaginal Exam: Dilation: 5 Effacement (%): 80 Fetal Station: -2  Fetal Heart Tracing: Baseline Rate: 130 bpm Variability: 6-25 Accelerations: Present Decels: none  Mode: External US   category 1  [place]: Mode: Internal IUPC Contraction Frequency: 2.5 - 4  Pitocin at 30  MVUs 200      Glucose fingerstick 91 at 1435    Assessment  29 y.o.  at 38w2d here with IOL for GDMA2 in latent labor, no cervical change in 10 hrs despite pitocin and AROM, on pitocin for 18 hrs.    Plan  Again reviewed option of  section. Patient would like to proceed.  Ancef 2g, azithro preop  TXA preventive, and methergine available in case of uterine atony  2U PRBC on hold    Nahomi Franco M.D.  "

## 2024-01-20 NOTE — ANESTHESIA TIME REPORT
Anesthesia Start and Stop Event Times       Date Time Event    1/18/2024 1944 Ready for Procedure     2007 Anesthesia Start    1/19/2024 1835 Anesthesia Stop          Responsible Staff  01/18/24 to 01/19/24      Name Role Begin End    Ryan Shi M.D. Anesth 2007 0657    Clare Arias M.D. Anesth 0657 1839          Overtime Reason:  no overtime (within assigned shift)    Comments:

## 2024-01-20 NOTE — PROGRESS NOTES
"62-74\" abdominal binder sent to tube station 31    Contact traction for any questions or concerns.   "

## 2024-01-20 NOTE — PROGRESS NOTES
Tried to call and message Dr. Hanks in regards to the ordered dose of Zoloft. The order dose was 50 mg, patient normally takes 75 mg at home. Dr. Hanks changed the dose to 75 mg of Zoloft daily.

## 2024-01-20 NOTE — L&D DELIVERY NOTE
OB  Delivery Note    2024  Chelsae Crarillo  29 y.o.    Review the Delivery Report for details.     Gestational Age: 38w2d  /Para:   Labor Complications:  none  Estimated Blood Loss:   Delivery Blood Loss  24 1736 - 24 1837      Est. Blood Loss Anesthesia 900 mL    Total  900 mL        IV fluids 2L  Clear urine    Delivery Type:  primary CS  ROM to Delivery Time: 11h 45m  [place] Sex: Female   Weight: 2.91 kg (6 lb 6.7 oz)    1 Minute 5 Minute 10 Minute   Apgar Totals: 8   8           Details    Pre-Op Diagnosis: 1. Intrauterine pregnancy at 38w2d  2. Failed induction of labor  3. GDMA2   Delivery Justification Patient was admitted to Labor and Delivery at 38w2d for induction of labor due to gestational diabetes   Post-Op Diagnosis: 1. Intrauterine pregnancy at 38w2d  2. Failed induction of labor  3. GDMA2  4. Nuchal cord x1   Indications:   Failed IOL   Procedure:     LSTCS via Pfanninsteil  incision   Staff Surgeon(s):  JUD Chacon M.D.  Circulator: Mariana Montoya R.N.  Scrub Person: Iveth Spangler  L&LYNN Baby  Nurse: Reilly Ramírez R.N.   Anesthesia:  Epidural, Clare Arias M.D.    Findings: Female infant delivered, weighing 2.91 kg (6 lb 6.7 oz) . Normal uterus, tubes, and ovaries.     Informed Consent:  The risks, benefits, complications, and alternatives were discussed with the patient. The patient understood that the risks of  section include, but are not limited to: injury to nearby structures or organs, infection, blood loss and possible need for transfusion, and potential need for more surgery including hysterectomy. The patient stated understanding and desired to proceed. All questions were answered. The site of surgery was properly noted and marked. The patient was identified as Chelsea Carrillo and the procedure verified as a  delivery. A Time Out was held and the above information  confirmed.    Procedure Details:  After adequate   epidural anesthesia was ascertained, the patient was prepped and draped in a normal supine position with a wedge under the right hip. Preop antibiotics and TXA were given prior to skin incision. A pfannensteil incision was made.  The incision was taken down to the fascia, which was incised medial to lateral.  The rectus muscles were dissected off the rectus sheath.  There was separation of the rectus sheath superiorly and the peritoneum was entered atraumatically, extended superiorly and inferiorly.  An Benjy O retractor was placed. The lower uterine segment was identified.  A low transverse incision was made in the uterus.  It was then extended digitally and the membranes were previously ruptured with clear fluid.  The infant was born in a vertex position.  It was a Living  Female infant.  Placenta removal: Expressed . Placental disposition: discarded . The uterus was repaired in situ with an benjy-o retractor, cleansed of all clots and membranes.  Attention was made towards closing the uterus in a 2-layer fashion with 0 Monocryl suture ligature in a running interlocking stitch with hemostatis assured.  The gutters were cleansed of all clots.  Tubal ligation was not performed.  Normal tubes and ovaries were noted.  The peritoneum was closed with 2-0 Vicryl in a running fashion. Fascia was closed with 0-Vicryl in a running fashion. The subcutaneous tissues were reapproximated with 2-0 plain. The subcutaneous tissues were irrigated with normal saline. Good hemostasis noted. The skin was reapproximated with 3-0 vicryl subcutaneous.    Prophylactic methergine IM given at the end of the procedure.    The patient tolerated the procedure well. Sponge, instrument, and needle counts were correct and the patient was taken to the recovery room in good and stable condition.    Nahomi Franco M.D.

## 2024-01-20 NOTE — CARE PLAN
Problem: Knowledge Deficit - Postpartum  Goal: Patient will verbalize and demonstrate understanding of self and infant care  Outcome: Progressing     Problem: Psychosocial - Postpartum  Goal: Patient will verbalize and demonstrate effective bonding and parenting behavior  Outcome: Progressing     Problem: Altered Physiologic Condition  Goal: Patient physiologically stable as evidenced by normal lochia, palpable uterine involution and vitals within normal limits  Outcome: Progressing   The patient is Stable - Low risk of patient condition declining or worsening    Shift Goals  Clinical Goals: stable VS and lochia WNL  Patient Goals: rest  Family Goals: support    Progress made toward(s) clinical / shift goals:    Pt reports comfort after pain interventions, Fundus firm and lochia light, VSS, educated on POC, needs met at this time. Questions answered. Will continue to educate.

## 2024-01-21 ENCOUNTER — PHARMACY VISIT (OUTPATIENT)
Dept: PHARMACY | Facility: MEDICAL CENTER | Age: 29
End: 2024-01-21
Payer: COMMERCIAL

## 2024-01-21 VITALS
HEART RATE: 73 BPM | OXYGEN SATURATION: 99 % | DIASTOLIC BLOOD PRESSURE: 81 MMHG | BODY MASS INDEX: 41.04 KG/M2 | RESPIRATION RATE: 18 BRPM | SYSTOLIC BLOOD PRESSURE: 129 MMHG | WEIGHT: 223 LBS | HEIGHT: 62 IN | TEMPERATURE: 97.4 F

## 2024-01-21 PROCEDURE — 700102 HCHG RX REV CODE 250 W/ 637 OVERRIDE(OP): Performed by: OBSTETRICS & GYNECOLOGY

## 2024-01-21 PROCEDURE — A9270 NON-COVERED ITEM OR SERVICE: HCPCS | Performed by: OBSTETRICS & GYNECOLOGY

## 2024-01-21 PROCEDURE — RXMED WILLOW AMBULATORY MEDICATION CHARGE: Performed by: OBSTETRICS & GYNECOLOGY

## 2024-01-21 RX ORDER — IBUPROFEN 800 MG/1
800 TABLET ORAL EVERY 8 HOURS
Qty: 30 TABLET | Refills: 1 | Status: SHIPPED | OUTPATIENT
Start: 2024-01-21

## 2024-01-21 RX ADMIN — SERTRALINE 75 MG: 50 TABLET, FILM COATED ORAL at 09:00

## 2024-01-21 RX ADMIN — ACETAMINOPHEN 1000 MG: 500 TABLET ORAL at 08:59

## 2024-01-21 RX ADMIN — IBUPROFEN 800 MG: 800 TABLET, FILM COATED ORAL at 05:53

## 2024-01-21 RX ADMIN — ACETAMINOPHEN 1000 MG: 500 TABLET ORAL at 03:11

## 2024-01-21 ASSESSMENT — PAIN DESCRIPTION - PAIN TYPE
TYPE: SURGICAL PAIN
TYPE: SURGICAL PAIN

## 2024-01-21 NOTE — CARE PLAN
The patient is Stable - Low risk of patient condition declining or worsening    Shift Goals  Clinical Goals: clincally stable  Patient Goals: rest  Family Goals: support    Progress made toward(s) clinical / shift goals:  Patient has been stable this shift.    Patient is not progressing towards the following goals:

## 2024-01-21 NOTE — CARE PLAN
The patient is Stable - Low risk of patient condition declining or worsening    Shift Goals  Clinical Goals: Pain management, normal lochia  Patient Goals: Bond with baby  Family Goals: support    Progress made toward(s) clinical / shift goals:  Patient reports adequate pain relief with current medications. She is aware of pain medication options. Lochia is scant to light, reviewed normal and expected changes and marco care.    Patient is not progressing towards the following goals:

## 2024-01-21 NOTE — DISCHARGE SUMMARY
DATE OF ADMISSION:  2024   DATE OF DISCHARGE:  2024     ADMITTING DIAGNOSES:  1.  Intrauterine pregnancy at 38 and 2/7 weeks.  2.  Class A2 gestational diabetes.     DISCHARGE DIAGNOSES:  1.  Intrauterine pregnancy at 38 and 2/7 weeks.  2.  Class A2 gestational diabetes.  3.  Failed induction of labor.  4.  Status post primary  section.     HOSPITAL COURSE: The patient was admitted and her induction of labor was   started.  Unfortunately, she had a failed induction of labor and underwent a   primary  section on the 2024.  She is postoperative day #2 and   desires to be discharged home.  Her blood sugars were checked while she was   laboring and were all found to be stable and discontinued after discharge.    She is ambulating, tolerating a regular diet, urinating without difficulty.    Incision is stable and covered with Mepilex dressing.  Her postoperative   hemoglobin is stable at 11.3.  She declined a prescription for narcotics and   has a prescription for Motrin.  She has been instructed to follow up with Dr. Bermeo in 2 weeks' time.        ______________________________  Corinne E. Capurro, MD [facility]    DD:  2024 06:28  DT:  2024 06:43    Job#:  [id number]

## 2024-01-21 NOTE — CARE PLAN
The patient is Stable - Low risk of patient condition declining or worsening    Shift Goals  Clinical Goals: clincally stable  Patient Goals: Bond with baby  Family Goals: support    Progress made toward(s) clinical / shift goals:  Patient is clinically stable to discharge.    Patient is not progressing towards the following goals:

## 2024-01-21 NOTE — LACTATION NOTE
"Chelsea, \"Erica\" is a 29 year old  who delivered on  at 1745 via C/S. Her daughter Cassi was 38+2 with a birth weight of 6 lb. 6.7 ounces. Her discharge weight is 6 lb. 3 ounces, (-3.5%).    Erica's prenatal history is significant for insulin dependent GDM, asthma, and anxiety.     Cassi was initially sleepy at the breast so three step plan was initiated. Erica does have a double electric pump for home use. Supplemental guidelines given, as well as outpatient resources list.     [facility] to [facility].     Assistance offered at next feeding.  "

## 2024-01-21 NOTE — DISCHARGE INSTRUCTIONS

## 2024-01-21 NOTE — PROGRESS NOTES
Infant started on 3-step feeding plan for sub optimal latch at >24hr of age.  MOB started pumping: speed 80x2 min then 60, suction 15% then to comfort, duration 15 min every 3 hours.

## 2024-01-27 ENCOUNTER — PATIENT MESSAGE (OUTPATIENT)
Dept: MEDICAL GROUP | Facility: LAB | Age: 29
End: 2024-01-27
Payer: COMMERCIAL

## 2024-01-27 DIAGNOSIS — F41.1 GAD (GENERALIZED ANXIETY DISORDER): ICD-10-CM

## 2024-01-27 DIAGNOSIS — J45.20 MILD INTERMITTENT ASTHMA WITHOUT COMPLICATION: ICD-10-CM

## 2024-01-27 DIAGNOSIS — Z91.018 TREE NUT ALLERGY: ICD-10-CM

## 2024-01-30 RX ORDER — FLUTICASONE PROPIONATE AND SALMETEROL 250; 50 UG/1; UG/1
1 POWDER RESPIRATORY (INHALATION) EVERY 12 HOURS
Qty: 60 EACH | Refills: 3 | Status: SHIPPED | OUTPATIENT
Start: 2024-01-30 | End: 2024-02-06 | Stop reason: SDUPTHER

## 2024-01-30 RX ORDER — SERTRALINE HYDROCHLORIDE 25 MG/1
25 TABLET, FILM COATED ORAL DAILY
Qty: 90 TABLET | Refills: 3 | Status: SHIPPED | OUTPATIENT
Start: 2024-01-30 | End: 2024-02-06 | Stop reason: SDUPTHER

## 2024-01-30 RX ORDER — EPINEPHRINE 0.3 MG/.3ML
INJECTION SUBCUTANEOUS
Qty: 1 EACH | Refills: 0 | Status: SHIPPED | OUTPATIENT
Start: 2024-01-30 | End: 2024-02-06 | Stop reason: SDUPTHER

## 2024-01-30 RX ORDER — ALBUTEROL SULFATE 90 UG/1
2 AEROSOL, METERED RESPIRATORY (INHALATION) EVERY 4 HOURS PRN
Qty: 8 G | Refills: 3 | Status: SHIPPED | OUTPATIENT
Start: 2024-01-30 | End: 2024-02-06 | Stop reason: SDUPTHER

## 2024-02-01 ENCOUNTER — TELEPHONE (OUTPATIENT)
Dept: MEDICAL GROUP | Facility: LAB | Age: 29
End: 2024-02-01
Payer: COMMERCIAL

## 2024-02-01 NOTE — TELEPHONE ENCOUNTER
MEDICATION PRIOR AUTHORIZATION NEEDED:    1. Name of Medication: Advair (fluticasone-salmeterol)    2. Requested By (Name of Pharmacy): [facility]     3. Is insurance on file current? yes    4. What is the name & phone number of the 3rd party payor? [facility]    Cover My Meds Key #  [id number]

## 2024-02-06 DIAGNOSIS — F41.1 GAD (GENERALIZED ANXIETY DISORDER): ICD-10-CM

## 2024-02-06 DIAGNOSIS — J45.20 MILD INTERMITTENT ASTHMA WITHOUT COMPLICATION: ICD-10-CM

## 2024-02-06 DIAGNOSIS — Z91.018 TREE NUT ALLERGY: ICD-10-CM

## 2024-02-06 PROCEDURE — RXMED WILLOW AMBULATORY MEDICATION CHARGE: Performed by: FAMILY MEDICINE

## 2024-02-06 RX ORDER — ALBUTEROL SULFATE 2.5 MG/3ML
2.5 SOLUTION RESPIRATORY (INHALATION) EVERY 4 HOURS PRN
Qty: 90 ML | Refills: 0 | Status: SHIPPED | OUTPATIENT
Start: 2024-02-06

## 2024-02-06 RX ORDER — ALBUTEROL SULFATE 90 UG/1
2 AEROSOL, METERED RESPIRATORY (INHALATION) EVERY 4 HOURS PRN
Qty: 8.5 G | Refills: 3 | Status: SHIPPED | OUTPATIENT
Start: 2024-02-06

## 2024-02-06 RX ORDER — EPINEPHRINE 0.3 MG/.3ML
INJECTION SUBCUTANEOUS
Qty: 2 EACH | Refills: 0 | Status: SHIPPED | OUTPATIENT
Start: 2024-02-06

## 2024-02-06 RX ORDER — SERTRALINE HYDROCHLORIDE 25 MG/1
25 TABLET, FILM COATED ORAL DAILY
Qty: 90 TABLET | Refills: 3 | Status: SHIPPED | OUTPATIENT
Start: 2024-02-06

## 2024-02-06 RX ORDER — FLUTICASONE PROPIONATE AND SALMETEROL 250; 50 UG/1; UG/1
1 POWDER RESPIRATORY (INHALATION) EVERY 12 HOURS
Qty: 60 EACH | Refills: 3 | Status: SHIPPED | OUTPATIENT
Start: 2024-02-06

## 2024-02-12 ENCOUNTER — OFFICE VISIT (OUTPATIENT)
Dept: OBGYN | Facility: CLINIC | Age: 29
End: 2024-02-12
Payer: COMMERCIAL

## 2024-02-12 DIAGNOSIS — O92.79 LACTATION DISORDER, POSTPARTUM CONDITION: ICD-10-CM

## 2024-02-12 PROCEDURE — 99999 PR NO CHARGE: CPT | Performed by: NURSE PRACTITIONER

## 2024-02-12 NOTE — PROGRESS NOTES
Summary: Feeding Cassi every 2-3 hours throughout the day, up to 6 hours at night. Offering 75mls or most feedings. Reports Cassi occasionally needs a break before she is able to finish her bottle. Pumping at feedings, yielding 2-4oz between both breasts. Attempting to latch 1x daily. Reports Cassi can latch for a few minutes with intense nipple pain.    Today: Implemented 24mm nipple shield. Latched to the left breast, cross cradle, sustained for 13 minutes and transferred 48mls. Latched to the right breast, football hold, transferred 6mls in 6 minutes. Pumped for 15 minutes, yielded 40mls on each side. Offered Cassi 20mls in the bottle, pacing well. Content to be held and placed in car seat.   Plan: Continue to pump at each feeding, up to 15 minutes. Recommended to switch to hospital pump to maximize milk supply. Attempt to latch 1-2x daily, up to 20 minutes between both breasts. Always pump after breastfeeding to protect milk supply. If breastfeeding first, offer 30mls after. If not breastfeeding, offer 80-90mls.     Follow up:   Lactation appointment:  2024  Baby 's Provider appointment: 2 Month Well Child Check   Referrals: None    Maternal Diagnosis/Problem:  Lactation Disorder- Baby not latching   Infant Diagnosis/Problem:   difficulties feeding at the breast     Subjective:     Chelsea Carrillo is a 29 y.o. female here for lactation care. She is here today with her  baby, Cassi .    Concerns:   Maternal: Latch on difficulties , Weight check, Infant feeding evaluation, and Breastfeeding questions   Infant: Breast refusal     HPI:   Pertinent  history:     Mother does not have a history of advanced maternal age, hypertension prior to pregnancy, GHTN, insulin resistance, multiple gestation, PCOS, thyroid disease, auto immune disease , placenta encapsulation, and breast surgery    Mother does have GDM. Common condition(s) that may interfere in milk supply.    Breast changes in  pregnancy: Yes  Breast surgeries: No    FEEDING HISTORY:    Previous Breastfeeding History: First baby.   Hospital Course: Triple feeding initiated and continued at discharge.   Currently 2/12/2024: Feeding Cassi every 2-3 hours throughout the day, up to 6 hours at night. Offering 75mls or most feedings. Reports Cassi occasionally needs a break before she is able to finish her bottle. Pumping at feedings, yielding 2-4oz between both breasts. Attempting to latch 1x daily. Reports Cassi can latch for a few minutes with intense nipple pain.      Both breasts: No     Supplement: Supplementation initiated inpatient and Expressed breast milk  Quantity: 75mls   Bottle type: Dr. Hand, Level 1    Breast Pumping:  Frequency: 6-7x  Quantity Obtained: 2-4oz  Type of Pump: Medela and Wearable MomCozy  Flange size/type: 24mm  NO pain with pumping    Maternal ROS:  Constitutional: No fever, chills. Feeling well  Breasts: No soreness of breasts and Soreness of nipples when latching   Psychiatric: Managing ok  Mental Health: No mention of feeling irritable, agitated, angry, overwhelmed, apathetic, appetite changes, exhausted nor having sleep changes outside infant feeds/demands.  Current Outpatient Medications on File Prior to Visit   Medication Sig Dispense Refill    albuterol (PROVENTIL) 2.5mg/3ml Nebu Soln solution for nebulization Take 3 mL by nebulization every four hours as needed for Shortness of Breath. 90 mL 0    albuterol 108 (90 Base) MCG/ACT Aero Soln inhalation aerosol Inhale 2 Puffs every four hours as needed for Shortness of Breath. 8.5 g 3    EPINEPHrine (EPIPEN) 0.3 MG/0.3ML Solution Auto-injector solution for injection Inject 0.3 mL into the thigh one time for 1 dose 2 Each 0    fluticasone-salmeterol (ADVAIR) 250-50 MCG/ACT AEROSOL POWDER, BREATH ACTIVATED Inhale 1 Puff every 12 hours. 60 Each 3    sertraline (ZOLOFT) 25 MG tablet Take 1 Tablet by mouth every day. Take with 50 mg tab for a total of 75 mg daily 90  Tablet 3    sertraline (ZOLOFT) 50 MG Tab Take 1 Tablet by mouth every day. Take with 25 mg tab for a total of 75 mg daily 90 Tablet 3    ibuprofen (MOTRIN) 800 MG Tab Take 1 Tablet by mouth every 8 hours. 30 Tablet 1    influenza vaccine quad (FLUZONE QUADRIVALENT) 0.5 ML Suspension Prefilled Syringe injection Inject 0.5 ml into the shoulder, thigh, or buttocks. 0.5 mL 0     No current facility-administered medications on file prior to visit.      Past Medical History:   Diagnosis Date    Anxiety 2015    Asthma     Bronchitis     Depression 2015    Heart murmur     Influenza     Nasal drainage     Pneumonia        Objective:     Maternal Physical Lactation Exam  General: No acute distress  Breasts: Symmetrical  and Soft  Nipples: intact  Psychiatric: Normal mood and affect. Her behavior is normal. Judgment and thought content normal.  Mental Health: Did NOT exhibit sadness, crying, feeling overwhelmed, agitation or hypervigilance.  2/12/2024: Hx of depression and anxiety. Currently taking medication and working with a therapist.     Assessment/Plan & Lactation Counseling:     Infant Exam on Infant Chart    Infant Weight History:   01/19/2024: 6# 6.7oz  01/24/2024: 5# 15.9oz  02/02/2024: 6# 8.8oz  02/12/2024: 7# 0.7oz    Infant Intake at Breast:      Total: 54mls  Milk Transfer at this feeding:   Effective breastfeeding     Pumped  Type of Pump: Hospital grade   Quantity Pumped:    Total: 80mls  Initiation of Feeding: Infant initiates  Attachment Achieved: with difficulty  Nipple shield:     Size: 24mm       Introduced today, 2/12/2024  Latch achieved? Yes  Difficult Latch Due To:   Position and latch  Suck Pattern at the Breast: Suck burst and normal rest and Chewing   Suck Pattern on the Bottle: Suck burst and normal rest  Behavior Following Observed Feeding: content  Nipple Pain: None with Nipple Shield     Latch: Assisted latch  Suckling/Feeding: attaches, audible swallows, baby fed effectively, baby roots,  elicits ZOYA, intermittent swallows, and rhythmic  Sucking strength: Moderate Strong  Sucking Rhythm Coordinated   Compression: WNL    Once latched, baby fell into a mature and fully integrated SSB pattern.    Swallowing No difficulty noted  If functional feeding, it is quiet, rhythmic, coordinated, organized, effeicent safe, satisfying and pleasurable for both parent and baby? No  Milk Supply Available: normal      MATERNAL PERSONALIZED BREASTFEEDING PLAN  (Babies and parents change and adapt  or mal-adapt, to each other, so a plan today is only as good as it facilitates the families goals, follow up is key in a dynamic process as breastfeeding.)  Discussed concerns and symptoms as listed above in assessment and guidance summarized below. Shared decision making was used between myself and the family for this encounter, home care, and follow up. Topics advised, taught and or reviewed included:   4th Trimester: The 12-week period immediately after mom has had the baby. Not everyone has heard of it, but every mother and their  baby will go through it. It is a time of great physical and emotional change as the baby adjusts to being outside the womb, and the family adjusts to new life as parents  During the fourth trimester, one can expect fussiness and crying from the baby and very likely exhaustion for the family. [place] babies are learning to adjust to life outside the womb where it was warm and squishy!  There is a lot of misinformation about babies and their needs, and parents are often encouraged to ignore baby's signals. Bad idea. Babies are “half-baked” at birth and have much to learn with the help of physical and emotional support from caregivers. Taking care of a baby's needs is an investment that pays off with a happier, healthier child and adult.  It can take weeks or even months for your body to feel totally normal again. There is a major hormonal upheaval experienced by moms in the first few weeks  "after birth, because their bodies are shifting from many pregnancy hormones to a more normal hormonal make-up.  These first three months with baby earth side is a delicate time. Honor it with a mindful dose of support. Mindful Mamma's is an uriel that may help.   Self-Compassion through Relational Support and Interaction.   Be kind to ourself. This is the first step in reducing anxiety and depression. Pay attention to how you are doing.   What do you need? Food, Rest, Sleep, Support, to Laugh/giggle: who will you ask today? They want to help you. We want to help you.   How do you stop your self-blame, or your self-criticism?   Get out of the house each day, walk or drive somewhere or ask a friend to drive you,  a \"treat\" at a drive through.  Milk Supply is dependent on glandular tissue development, hormonal influences, how many times the baby/pump removes milk and how well the breasts are emptied in a 24 hour period. This is a biological reality that we can NOT work around. There's no magic trick, tea, food, drink, cookie or supplement that will increase your milk supply. One  must  effectively remove milk to continue to make and maximize milk. In the early days and weeks that can be 8+ times in 24 hours. For older babies, on average 6-7 + times in 24 hours.    Hydration: Staying hydrated is important however lack of hydration is usually not a cause of significantly low milk production.  Everyone needs a different amount of water, depending on their activity and diet. A high salt and/or high-protein diet, high physical activity, or very warm weather/sweating will require more fluids. A person eating a diet high in veggies and fruit, with a lack of physical activity will require fewer fluids. There is no magic number for the # of ounces of water each day.The best way to know that you are well hydrated is by looking at your urine.  Urine should look clear to light pale yellow, and you should need to pee at least " every 3 to 4 hours unless you have a large bladder capacity.  Herbs and Medications: A galactagogue is an herb or medication taken by a breastfeeding mother to increase her milk supply. We know that for centuries mothers around the world have sought out remedies to increase their milk supply. However, there is limited research on the safety and effectiveness of herbal galactagogues, which makes it hard for us to endorse them. It is not known if any of these herbs are truly effective, and it is difficult to predict how a specific herbal galactagogue will affect an individual, requiring “trial and error” in many situations. When effective, results are generally seen within 24-72 hours of starting an herbal galactagogue. Many of these herbs are used to decrease high blood sugar. If you are diabetic or have problems with low blood sugar, or thyroid disease you may not be a candidate for herbs. Not all women can increase their low milk supply with a galactagogue due to the many underlying causes of low milk production.  When taking a galactagogue, remember that frequent milk removal is still the most effective way to increase supply.   Feeding:   Infant feeding well given current interval growth, guidelines to follow:  Feed your baby every 1.5-3 hours, more often if baby acts hungry.   Awaken baby for feeding if going over 3 hours in the day.   No need to wake Cassi for nighttime feedings.   Daily goal: 8 feedings per 24 hours.   Supplement:   Supplement with expressed milk  Offer 30mls after breastfeeding  Offer 80-90mls if not breastfeeding  Nipple shield (NS): We prefer the 24mm Medela.   Before applying, roll the shield in on itself (like a sombrero, and allow breast to be pulled  in to the shield tip).   The latch is very different from the bare breast, bring the baby to you and let them find the nipple shield and they will manage it, tuck them close once they find it, cheek against breast.   Once you and your baby  are familiar with the NS, you may be able to just put it on the breast and latch the baby without any preparation.  Pacifier Use:  The American Academy of Pediatrics' Position Paper reports: Although we recommend a conservative approach regarding pacifier use, we do not endorse a complete ban on the use of pacifiers, nor do we support an approach that induces parental guilt concerning their choices about the use of pacifiers. Pacifier use and breastfeeding in term and  newborns- a systematic review and meta-analysis from the  J of Pediatrics Published online 2022. Has found that when pacifiers are used among individuals who have been counseled on the risks, do not interfere with breastfeeding exclusivity or duration. This is a  parental choice.   Positioning Techniques  Pillow used: Candice Chamberlain  Suggested positions Cross cradle and Football  Fine tune position by making sure your fingers beneath the breast as well as your bra, are out of the way of your baby's chin.  Positioning: See http://globalhealthmedia.org/portfolio-items/positions-for-breastfeeding/?goarmycknFJ=66457  Latch on Techniques   Modify for nipple shield use soon enough you will move back to bare breast.  Aim is an asymmetric deep latch.  First make yourself comfortable. Sit with knees bent (unless lying down), feet on a stool if needed. You will support your baby, and pillows will be used to support YOU. You want your baby's belly facing your breast/body (belly to belly). If your baby is extremely fussy and crying, do skin-to-skin for a while until he or she calms down, then try (or try again).   Fine tune latch:  By holding your baby more securely at the breast, assisting your baby to stay attached by:  Support your baby with your hand behind the shoulder blades (NOT THE HEAD).  1. Align your baby's NOSE with your nipple  2. Your baby's chin should be the first part of his or her body to touch your breast  3. Tickle  "the baby's upper lip or nose with your nipple  4. When your baby's mouth is WIDE OPEN, swiftly bring your baby's mouth over your nipple/areola.  Steps 2 and 3 could be slightly reversed order or essentially happening at the same time.  Bringing your baby to your breast, not breast to the baby  Your baby's cheek to touch breast securely, nose tipped back  Hold your baby firmly in place so when your baby forgets to suck and picks it back up again your baby is in the correct spot. You will be extinguishing behavior and replacing it with a deeper latch to stimulate suck and provide satisfaction at the breast.  Your baby needs as much breast as deep in the mouth as possible to allow your nipples to heal and for you more importantly to maximize efficiency at the breast  If that doesn’t help, you should make an appointment with me to re-evaluate.   Latch is asymmetrical, leading with the chin, getting the baby below the breast, as if offering a large \"deli maura sandwich\".  Here is a video from LAWANDA on the asymmetric latch       https://www.[employer]raudMet[employer]ube.com/watch?v=0I-RNu7Wb43  Pumping Guidelines:  Both breasts 7-8x in 24 hours  Type of Pump:  Hospital grade and Medela  Ameda [place] Settings http://www.[employer].com/healthcare-professionals/videos/ameda-platinum-with-hygienikit-video   Speed: Start at 80 then turn down to 60 after 1.5-2 minutes when you see milk in the flange channel  Suction: To comfort, goal of  30-50%. NEVER to discomfort.   Today you were at 30%  Power Pumping is only rarely indicated as the response is not significant or zero over 24 hours  How long will vary woman to woman, typically 8-15 minutes, or 1-2 minutes after flow slows  Flange Fit: Freely moving nipple in the tunnel with some movement of the areola.  Today's evaluation indicates a flange change would be appropriate  MomCozy: 17-18mm  Ameda/Medela: 19-20mm  Caution with Breast Massage: The word “Massage” means different things in the breastfeeding " world. It is described and interpreted in so many different ways and unfortunately potentially harmful. The hands can be safe on a breast and  gentle to help in many ways but they also can be damaging when used in the wrong way.  Lymphatic drainage massage is appropriate,  open hand , lightly stroking only, and can be highly effective. The massage advice to knead , massage deeply , vibrate with marketed tools is  most concerning. Be gentle with this gland to not increase inflammation.   Storage (Acceptable guidelines for healthy term babies)  10 hours at room temp including your pieces, may rinse but not mandatory  8 days refrigerator, don't need  to refrigerate right away if using fresh pumped milk at the next feeding  Freezer 1 year  Deep freezer 2 years  American Academy or Pediatrics has said you may mix different temperature milks.   If baby drinks breast milk from a fresh or refrigerated bottle of breast milk,  you may return the unused portion to the refrigerator and use once at next feeding.   Answers to FAQs: https://www.infantrisk.com/category/breastfeeding  Alcohol & Breastfeeding: What's your time-to-zero?  Cough & Cold Medications while Breastfeeding  Vitamin D Supplementation and Breastfeeding  Antidepressant Use While Breastfeeding: What should I know?  Breastfeeding, Caffeine, and Energy Drinks  Recommended resources:  Physicians guide to breastfeeding, must up to date and accurate information available on breastfeeding. https://physicianguidetobreastfeeding.org/  Dads  Step #1 (for Partners): If possible, arrange your life so you can engage with your baby early and often                                                                                 Step #2 (for Moms): Encourage your partner to be hands-on - and let him handle things differently.                                                                                            Step #3 (for Partners): Realize that “your way” of parenting is  essential for your child. https://doi.org/10.1093/cercor/krzu123  Connect with other mothers:  Facebook:   [place] [facility]: https://www.facebook.com/[place].breastfeeds/  Well-Nourished Babies (Private group for questions and support): https://www.facebook.com/groups/393796700317399/  Breastfeeding Craig including opportunity to weight your baby and do before and after weights   Spoiler alert!  Parenting can be really hard. There is so much to learn when it comes to caring for small humans.  It can feel lonely and unsettling.  Our group, is a parenting and feeding support group that's all about feeding/growing jason.   Babies welcome.   Questions expected.   We will help you find your village!    Tuesdays 10am - 11am. Women's Health at [address] [place] [address], [address] [place], 3rd floor conference room  Check your baby's weight, do a feeding and see how your baby is growing, visit with other mothers, plan on a walk or coffee date after group.  Please download Growth: Baby and Child for [employer] or Child Growth Tracker for [employer] if you would like to  document and follow your baby's growth curve.  Due to space limitations - limit strollers please (New c/section moms please use your stroller).  We would love to have dads stay, but moms won't breastfeed if there are men in the room, sorry.  The room is generally scheduled for another event following group.  Please take all diapers with you   ONLINE SUPPORT GROUPS  Postpartum [employer] International (PSI) support groups are conducted using a jyfu-vr-bnst support model and are not intended for those experiencing a mental health crisis. Groups are 90 minutes (1.5 hours) in length. The first ~30 minutes is providing information, education, and establishing group guidelines. The next ~60 minutes is “talk time,” in which group members share and talk with each other. Group members must be present for the group guidelines before joining in the discussion or “talk time.”       In  Conclusion:   Managing breastfeeding and milk supply is very dynamic,can be a complex and intimate journey, and is not one size fits all. When obstacles present themselves, it takes confidence, persistence and support. The rights of the child include optimal nutrition and mothers need help to make informed decisions. You  and your baby have been screened for biological, psychological, and social risk factors that might interfere with achieving your goals.  Support is critical. We want the family thriving not just tolerating life. You are now the focus of our Breastfeeding Medicine team; we are here to support your decisions and vision.     Follow up   Please call [phone] our voicemail line or the front office at [phone] to scheduled your next appointment.      A total of 60 minutes, including infant assessment time,  was spent preparing to see the patient, obtaining and reviewing separately obtained history, performing a medically appropriate examination and evaluation, counseling and educating the family, referring and communicating with other health care professionals, documenting clinical information in the electronic health record, independently interpreting weighted feeds and infant growth results, communicating these results to the family and care coordination as detailed in the above note.       Essie Quan

## 2024-02-19 PROCEDURE — RXMED WILLOW AMBULATORY MEDICATION CHARGE: Performed by: FAMILY MEDICINE

## 2024-02-20 ENCOUNTER — OFFICE VISIT (OUTPATIENT)
Dept: OBGYN | Facility: CLINIC | Age: 29
End: 2024-02-20
Payer: COMMERCIAL

## 2024-02-20 DIAGNOSIS — O92.70 LACTATION PROBLEM: ICD-10-CM

## 2024-02-20 PROCEDURE — 99215 OFFICE O/P EST HI 40 MIN: CPT | Performed by: NURSE PRACTITIONER

## 2024-02-20 NOTE — PROGRESS NOTES
Summary: Breastfeeding once per day, primarily the left but difficulty with latch with and without nipple shield. Pumping sufficient supply. Bottles easily. Triple and double feeding exhausting and unsustainable.   Today: Latched easily in football hold to the left side with NS and removed 57ml of the 118ml available 50%. Offered the left side, football and cross cradle, did not initiate the let down, baby didn't fuss but didn't have milk to respond to. Offered bottle but not interested in more than a sip.  Oral exam done, no restrictions seen, digital oral exam shows tongue not fully functioning, not holding tightly to the finger when lip drawn back and lips blanching after a feeding. Pumped 2oz on the left, 1 oz on the right, left letdown right away, right 3 minutes.   Plan: Breastfeed 0-2x/day stay on the left, mom cozy right, offer bottle after.  Bottle feeding managed well given good weight gain. Pumping mostly Ameda and Medela but encouraged mom cozy 1/2 the time for more freedom.   Follow up:   Lactation appointment: Group Encouraged  Baby 's Provider appointment: 2 Month Well Child Check   Referrals: None    Maternal Diagnosis/Problem:  Lactation Problem  Infant Diagnosis/Problem:   difficulties feeding at the breast     Subjective:     Chelsea Carrillo (Gaby) is a 29 y.o. female here for lactation care. She is here today with her baby.    Concerns:   Maternal: Latch on difficulties , Weight check, Infant feeding evaluation, and Breastfeeding questions   Infant: Baby not interested more on the left    HPI:   Pertinent  history: c/section failure to progress after 5cm    Mother does not have a history of advanced maternal age, hypertension prior to pregnancy, GHTN, insulin resistance, multiple gestation, PCOS, thyroid disease, auto immune disease , placenta encapsulation, and breast surgery    Mother did have GDM. Common condition(s) that may interfere in milk supply.    Breast changes in  pregnancy: Yes  Breast surgeries: No    FEEDING HISTORY:    Previous Breastfeeding History: First baby.   Hospital Course: Triple feeding initiated and continued at discharge.   Prior to consultation on 2/12/2024: Feeding Cassi every 2-3 hours throughout the day, up to 6 hours at night. Offering 75mls or most feedings. Reports Cassi occasionally needs a break before she is able to finish her bottle. Pumping at feedings, yielding 2-4oz between both breasts. Attempting to latch 1x daily. Reports Cassi can latch for a few minutes with intense nipple pain.    Currently 2/20/2024Breastfeeding once per day, primarily the left but difficulty with latch with and without nipple shield. Pumping sufficient supply. Bottles easily. Triple and double feeding exhausting and unsustainable.   Both breasts: No     Supplement: Supplementation initiated inpatient and Expressed breast milk  Quantity: 90ml  Bottle type: Dr Hand level 1    Breast Pumping:  Frequency: 7x/day  Quantity Obtained: sufficient to feed and extra  Type of Pump: Platinum, Medela, and Mom cozy  Flange size/type: 19mm  NO pain with pumping    Maternal ROS:  Constitutional: No fever, chills. Feeling well  Breasts: No soreness of breasts and No soreness of nipples  Psychiatric: Managing ok  Mental Health: No mention of feeling irritable, agitated, angry, overwhelmed, apathetic, appetite changes, exhausted nor having sleep changes outside infant feeds/demands.  Current Outpatient Medications on File Prior to Visit   Medication Sig Dispense Refill    albuterol (PROVENTIL) 2.5mg/3ml Nebu Soln solution for nebulization Take 3 mL by nebulization every four hours as needed for Shortness of Breath. 90 mL 0    albuterol 108 (90 Base) MCG/ACT Aero Soln inhalation aerosol Inhale 2 Puffs every four hours as needed for Shortness of Breath. 8.5 g 3    EPINEPHrine (EPIPEN) 0.3 MG/0.3ML Solution Auto-injector solution for injection Inject 0.3 mL into the thigh one time for 1 dose 2  Each 0    fluticasone-salmeterol (ADVAIR) 250-50 MCG/ACT AEROSOL POWDER, BREATH ACTIVATED Inhale 1 Puff every 12 hours. 60 Each 3    sertraline (ZOLOFT) 25 MG tablet Take 1 Tablet by mouth every day. Take with 50 mg tab for a total of 75 mg daily 90 Tablet 3    sertraline (ZOLOFT) 50 MG Tab Take 1 Tablet by mouth every day. Take with 25 mg tab for a total of 75 mg daily 90 Tablet 3    ibuprofen (MOTRIN) 800 MG Tab Take 1 Tablet by mouth every 8 hours. 30 Tablet 1    influenza vaccine quad (FLUZONE QUADRIVALENT) 0.5 ML Suspension Prefilled Syringe injection Inject 0.5 ml into the shoulder, thigh, or buttocks. 0.5 mL 0     No current facility-administered medications on file prior to visit.      Past Medical History:   Diagnosis Date    Anxiety 2015    Asthma     Bronchitis     Depression 2015    Heart murmur     Influenza     Nasal drainage     Pneumonia          Objective:     Maternal Physical Lactation Exam  General: No acute distress  Breasts: Symmetrical , Soft, Plugged Duct - no evidence, and Mastitis  - no S/S  Nipples: intact and erythematous  Psychiatric: Normal mood and affect. Her behavior is normal. Judgment and thought content normal.  Mental Health: Did NOT exhibit sadness, crying, feeling overwhelmed, agitation or hypervigilance.    Infant Oral Exam:   Oral: On the right breast functional suck limited, partially due to slower let down but also learning tongue movement  NS helpful on the left breast.   Lips liana after feeding.     Assessment/Plan & Lactation Counseling:     Infant Exam on Infant Chart    Infant Weight History:   01/19/2024: 6# 6.7oz  01/24/2024: 5# 15.9oz  02/02/2024: 6# 8.8oz  02/12/2024: 7# 0.7oz  2/20/20247#8.8oz  Infant Intake at Breast:      Total: 57ml left, 0ml right  Milk Transfer at this feeding:   Ineffective breastfeeding; not able to transfer a full feed from breast r/t learning and some tongue dysfunction      Pumped:   Type of Pump: Platinum   Quantity Pumped:    Total:  3oz  Initiation of Feeding: Infant initiates  Attachment Achieved: rapidly on the left, sucking inhibited on the right  Nipple shield:    Size: 24mm       Introduced last visit 2/1/24  Latch achieved? Yes on the left, tried on the right but not sustained    Suck Pattern at the Breast: Suck burst and normal rest on the left  Suck Pattern on the Bottle: Not interested    Behavior Following Observed Feeding: content  Nipple Pain: None     Latch: Mom latches independently and Assisted latch  Suckling/Feeding: attaches, audible swallows, baby roots, elicits ZOYA, and rhythmic on the left  Sucking strength: Moderate Strong  Sucking Rhythm Coordinated   Compression: WNL    Once latched, baby fell into a mature and fully integrated SSB pattern. On the left only   Swallowing No difficulty noted  If functional feeding, it is quiet, rhythmic, coordinated, organized, effeicent safe, satisfying and pleasurable for both parent and baby?  No  Milk Supply Available: normal +    MATERNAL PERSONALIZED BREASTFEEDING PLAN  Plan provided 2/12/24, updates as followed  (Babies and parents change and adapt  or mal-adapt, to each other, so a plan today is only as good as it facilitates the families goals, follow up is key in a dynamic process as breastfeeding.)  Discussed concerns and symptoms as listed above in assessment and guidance summarized below. Shared decision making was used between myself and the family for this encounter, home care, and follow up. Topics advised, taught and or reviewed included:   Triple Feeding and its sustainability and its impact on the mother baby relationship  Milk Supply is dependent on glandular tissue development, hormonal influences, how many times the baby removes milk and how well the breasts are emptied in a 24 hour period. This is a biological reality that we can NOT work around. If, for any reason, your baby is not latching, or you are not able to nurse, then it is important for you to remove the  "milk instead by pumping or hand expression.  There's no magic trick, tea, food, drink, cookie or supplement that will increase your milk supply. One  must  effectively remove milk to continue to make and maximize milk. In the early days and weeks that can be 8+ times in 24 hours. For older babies, on average 6-7 + times in 24 hours.    Feeding:   Infant feeding well given current interval growth, guidelines to follow:  Feed your baby every 1.5-3 hours, more often if baby acts hungry.   Awaken baby for feeding if going over 3 hours in the day.    Daily goal: 8-12 feedings per 24 hours.    Given infants weight you may allow baby to go longer at night but that generally means shorter durations in the day.  Supplement:   Supplement with expressed milk  Strategies to improve efficiency and ability to feed and or enhance feeding effectiveness and comfort:   Suck training Have baby suck on your clean finger (nail down) pad of finger in the palate  Introduce slowly, avoid gagging.  Press up slightly in the palate and pull finger toward the front of the mouth, not all the way out When baby sucks, use \"tug of war\" to provide resistance.    Sometimes our work is to disrupt old patterns so that new and more functional patterns can be established.  STRATEGIES to reduce tightness and facilitate tongue use    At the breast and/or  bottle;               Tug of war and then pull the lip down to encourage the tongue to cup and move forward               Breast lift               Pacing the feeding:  A slow flow nipple helps, but how you feed the baby is more important.  How you are  positioning the bottle can compensate for a faster flow nipple.  When bottle-feeding, the baby should control how much is consumed at a feeding.  Holding the baby in an upright position with the bottle horizontal ensures that the baby gets milk only when sucking.  Here is a nice video demonstrating this concept of paced bottle feeding,  " "https://www.youtube.com/watch?v=RoHFP0oKD0J Think baby led, not parent led.  Nipple shield (NS): We prefer the 24mm Medela.   Before applying, roll the shield in on itself (like a sombrero, and allow breast to be pulled  in to the shield tip).   The latch is very different from the bare breast, bring the baby to you and let them find the nipple shield and they will manage it, tuck them close once they find it, cheek against breast.   Once you and your baby are familiar with the NS, you may be able to just put it on the breast and latch the baby without any preparation.  Positioning Techniques for Bare Breast  Pillow used: Candice 2 Nu Bulmaro  Suggested positions Football  Fine tune position by making sure your fingers beneath the breast as well as your bra, are out of the way of your baby's chin.  Positioning: See http://globalhealthmedia.org/portfolio-items/positions-for-breastfeeding/?wjvmxmkplEU=58896  Latch on Techniques for Bare Breast.   Modify for nipple shield use soon enough you will move back to bare breast.  \"Double feeding\" as baby prepares for more robust feeding sessions  If triple feeing, FIRST decide what you can do at that feeding and if you decide to double feed -pump and bottle, that is sufficient.  If you are going to offer the breast at that feeding:  nurse first and limit time on the breasts to 20+/- min total  finish with bottle  pump afterwards to finish emptying your breasts which will help increase milk supply  As baby becomes more effective, evidenced by not pumping much milk after a breastfeeding, we will create a plan to decrease pumping.  Use your own pumped milk, donor human milk, or formula.  Pumping Guidelines:  Both breasts 6-7 times in 24 hours  Type of Pump:  Platinum  Ameda [place] Settings http://www.[place].com/healthcare-professionals/videos/ameda-platinum-with-hygienikit-video   Speed: Start at 80 then turn down to 60 after 1.5-2 minutes when you see milk in the flange " channel  Suction: To comfort, goal of  31%. NEVER to discomfort.   Today you were at 45%  How long will vary woman to woman, typically 8-15 minutes, or 1-2 minutes after flow slows  Flange Fit: Freely moving nipple in the tunnel with some movement of the areola.  Today's evaluation indicates appropriate flange  Caution with Breast Massage: The word “Massage” means different things in the breastfeeding world. It is described and interpreted in so many different ways and unfortunately potentially harmful. The hands can be safe on a breast and  gentle to help in many ways but they also can be damaging when used in the wrong way.  Lymphatic drainage massage is appropriate,  open hand , lightly stroking only, and can be highly effective. The massage advice to knead , massage deeply , vibrate with marketed tools is  most concerning. Be gentle with this gland to not increase inflammation.   Storage (Acceptable guidelines for healthy term babies)  10 hours at room temp including your pieces, may rinse but not mandatory  8 days refrigerator, don't need  to refrigerate right away if using fresh pumped milk at the next feeding  Freezer 1 year  Deep freezer 2 years  American Academy or Pediatrics has said you may mix different temperature milks.   If baby drinks breast milk from a fresh or refrigerated bottle of breast milk,  you may return the unused portion to the refrigerator and use once at next feeding.   Recommended resources:  Physicians guide to breastfeeding, must up to date and accurate information available on breastfeeding. https://physicianguidetobreastfeeding.org/    Spoiler alert!  Parenting can be really hard. There is so much to learn when it comes to caring for small humans.  It can feel lonely and unsettling.  Our groups, are parenting and feeding support groups that's all about feeding/growing jason.   Babies welcome.   Questions expected.   We will help you find your village!   Connect with other  mothers:  Facebook:   [place] [facility]: https://www.facebook.com/[place].breastfeeds/  Well-Nourished Babies (Private group for questions and support): https://www.facebook.com/groups/476898968702751/  Online Breastfeeding [place] Online Group  Thursdays, 12 noon - 1 pm Facilitated by Brody Louis Link: [email or web address]   Passcode [id number]   [id number]  Breastfeeding [place] including opportunity to weight your baby and do before and after weights   Tuesdays 10am - 11am. Women's Health at [address] [place] [address], [address] [place] [address], 3rd floor conference room  Check your baby's weight, do a feeding and see how your baby is growing, visit with other mothers, plan on a walk or coffee date after group.  Please download Growth: Baby and Child for Apple or Child Growth Tracker for [place] if you would like to  document and follow your baby's growth curve.  Due to space limitations - limit strollers please (New c/section moms please use your stroller).  We would love to have dads stay, but moms won't breastfeed if there are men in the room, sorry.  The room is generally scheduled for another event following group.  Please take all diapers with you   PSI ONLINE SUPPORT GROUPS  [employer] (PSI) support groups are conducted using a yhue-dy-cexe support model and are not intended for those experiencing a mental health crisis. Groups are 90 minutes (1.5 hours) in length. The first ~30 minutes is providing information, education, and establishing group guidelines. The next ~60 minutes is “talk time,” in which group members share and talk with each other. Group members must be present for the group guidelines before joining in the discussion or “talk time.”     Position, Latch and Pumping discussed and plan provided. (Documented on moms chart).     Infant Exam Summary:    Healthy 32 day old.  Anticipatory guidance was provided regarding  feedings.   Weight  good interval growth:  Created a plan to meet family's breastfeeding goals.  Patient learning to breastfeed and needs nipple shield, has a delayed let down on the right so best feedings on the right. Not yet transferring full feeds mars with full supply   Plan for slow breastfeeding introduction 1-2x/day, primarily left side, pump 6-7xday, continue with bottle    Contact Breastfeeding Medicine    or your Pediatrician for any of the following:   Decreased wet or poopy diapers  Decreased feeding  Baby not waking up for feeds on own most of time.   Irritability  Lethargy  Dry sticky mouth.   Any breastfeeding questions or concerns.    In Conclusion:   Managing breastfeeding and milk supply is very dynamic,can be a complex and intimate journey, and is not one size fits all. When obstacles present themselves, it takes confidence, persistence and support. The rights of the child include optimal nutrition and mothers need help to make informed decisions. You  and your baby have been screened for biological, psychological, and social risk factors that might interfere with achieving your goals.  Support is critical. We want the family thriving not just tolerating life. You are now the focus of our Breastfeeding Medicine team; we are here to support your decisions and vision.     Follow up   Please call [phone] our voicemail line or the front office at [phone] to scheduled your next appointment.   Family is encouraged to e-mail or Votizenhart  to update how the plan is working.     A total of 60 minutes, not including infant assessment time,  was spent preparing to see the patient, obtaining and reviewing separately obtained history, performing a medically appropriate examination and evaluation, counseling and educating the family, documenting clinical information in the electronic health record, independently interpreting weighted feeds and infant growth results, communicating these results to the family and  care coordination as detailed in the above note.       EMELI Daniel.

## 2024-02-22 ENCOUNTER — PHARMACY VISIT (OUTPATIENT)
Dept: PHARMACY | Facility: MEDICAL CENTER | Age: 29
End: 2024-02-22
Payer: COMMERCIAL

## 2024-04-08 PROCEDURE — RXMED WILLOW AMBULATORY MEDICATION CHARGE: Performed by: FAMILY MEDICINE

## 2024-04-16 ENCOUNTER — PHARMACY VISIT (OUTPATIENT)
Dept: PHARMACY | Facility: MEDICAL CENTER | Age: 29
End: 2024-04-16
Payer: COMMERCIAL

## 2024-05-22 ENCOUNTER — APPOINTMENT (OUTPATIENT)
Dept: RADIOLOGY | Facility: MEDICAL CENTER | Age: 29
End: 2024-05-22
Attending: EMERGENCY MEDICINE
Payer: COMMERCIAL

## 2024-05-22 ENCOUNTER — HOSPITAL ENCOUNTER (EMERGENCY)
Facility: MEDICAL CENTER | Age: 29
End: 2024-05-22
Attending: EMERGENCY MEDICINE
Payer: COMMERCIAL

## 2024-05-22 ENCOUNTER — OFFICE VISIT (OUTPATIENT)
Dept: MEDICAL GROUP | Facility: LAB | Age: 29
End: 2024-05-22
Payer: COMMERCIAL

## 2024-05-22 VITALS
OXYGEN SATURATION: 93 % | RESPIRATION RATE: 19 BRPM | BODY MASS INDEX: 38.26 KG/M2 | SYSTOLIC BLOOD PRESSURE: 110 MMHG | TEMPERATURE: 97.1 F | HEART RATE: 80 BPM | DIASTOLIC BLOOD PRESSURE: 59 MMHG | WEIGHT: 207.89 LBS | HEIGHT: 62 IN

## 2024-05-22 VITALS
DIASTOLIC BLOOD PRESSURE: 60 MMHG | SYSTOLIC BLOOD PRESSURE: 110 MMHG | BODY MASS INDEX: 38.09 KG/M2 | OXYGEN SATURATION: 96 % | WEIGHT: 207 LBS | TEMPERATURE: 98.2 F | RESPIRATION RATE: 16 BRPM | HEIGHT: 62 IN | HEART RATE: 86 BPM

## 2024-05-22 DIAGNOSIS — R07.81 PLEURITIC CHEST PAIN: ICD-10-CM

## 2024-05-22 DIAGNOSIS — R07.89 STERNAL PAIN: ICD-10-CM

## 2024-05-22 LAB
ALBUMIN SERPL BCP-MCNC: 4.2 G/DL (ref 3.2–4.9)
ALBUMIN/GLOB SERPL: 1.2 G/DL
ALP SERPL-CCNC: 126 U/L (ref 30–99)
ALT SERPL-CCNC: 26 U/L (ref 2–50)
ANION GAP SERPL CALC-SCNC: 12 MMOL/L (ref 7–16)
AST SERPL-CCNC: 20 U/L (ref 12–45)
BASOPHILS # BLD AUTO: 0.6 % (ref 0–1.8)
BASOPHILS # BLD: 0.06 K/UL (ref 0–0.12)
BILIRUB SERPL-MCNC: 0.2 MG/DL (ref 0.1–1.5)
BUN SERPL-MCNC: 18 MG/DL (ref 8–22)
CALCIUM ALBUM COR SERPL-MCNC: 9.2 MG/DL (ref 8.5–10.5)
CALCIUM SERPL-MCNC: 9.4 MG/DL (ref 8.5–10.5)
CHLORIDE SERPL-SCNC: 103 MMOL/L (ref 96–112)
CO2 SERPL-SCNC: 25 MMOL/L (ref 20–33)
CREAT SERPL-MCNC: 0.61 MG/DL (ref 0.5–1.4)
D DIMER PPP IA.FEU-MCNC: <0.27 UG/ML (FEU) (ref 0–0.5)
EKG IMPRESSION: NORMAL
EOSINOPHIL # BLD AUTO: 0.44 K/UL (ref 0–0.51)
EOSINOPHIL NFR BLD: 4.2 % (ref 0–6.9)
ERYTHROCYTE [DISTWIDTH] IN BLOOD BY AUTOMATED COUNT: 54.2 FL (ref 35.9–50)
GFR SERPLBLD CREATININE-BSD FMLA CKD-EPI: 125 ML/MIN/1.73 M 2
GLOBULIN SER CALC-MCNC: 3.6 G/DL (ref 1.9–3.5)
GLUCOSE SERPL-MCNC: 92 MG/DL (ref 65–99)
HCG SERPL QL: NEGATIVE
HCT VFR BLD AUTO: 42.7 % (ref 37–47)
HGB BLD-MCNC: 14.3 G/DL (ref 12–16)
IMM GRANULOCYTES # BLD AUTO: 0.04 K/UL (ref 0–0.11)
IMM GRANULOCYTES NFR BLD AUTO: 0.4 % (ref 0–0.9)
LYMPHOCYTES # BLD AUTO: 2.63 K/UL (ref 1–4.8)
LYMPHOCYTES NFR BLD: 24.9 % (ref 22–41)
MCH RBC QN AUTO: 27.3 PG (ref 27–33)
MCHC RBC AUTO-ENTMCNC: 33.5 G/DL (ref 32.2–35.5)
MCV RBC AUTO: 81.6 FL (ref 81.4–97.8)
MONOCYTES # BLD AUTO: 0.59 K/UL (ref 0–0.85)
MONOCYTES NFR BLD AUTO: 5.6 % (ref 0–13.4)
NEUTROPHILS # BLD AUTO: 6.8 K/UL (ref 1.82–7.42)
NEUTROPHILS NFR BLD: 64.3 % (ref 44–72)
NRBC # BLD AUTO: 0 K/UL
NRBC BLD-RTO: 0 /100 WBC (ref 0–0.2)
PLATELET # BLD AUTO: 385 K/UL (ref 164–446)
PMV BLD AUTO: 8.4 FL (ref 9–12.9)
POTASSIUM SERPL-SCNC: 4.5 MMOL/L (ref 3.6–5.5)
PROT SERPL-MCNC: 7.8 G/DL (ref 6–8.2)
RBC # BLD AUTO: 5.23 M/UL (ref 4.2–5.4)
SODIUM SERPL-SCNC: 140 MMOL/L (ref 135–145)
TROPONIN T SERPL-MCNC: <6 NG/L (ref 6–19)
WBC # BLD AUTO: 10.6 K/UL (ref 4.8–10.8)

## 2024-05-22 PROCEDURE — 3078F DIAST BP <80 MM HG: CPT | Performed by: FAMILY MEDICINE

## 2024-05-22 PROCEDURE — 99213 OFFICE O/P EST LOW 20 MIN: CPT | Performed by: FAMILY MEDICINE

## 2024-05-22 PROCEDURE — 3074F SYST BP LT 130 MM HG: CPT | Performed by: FAMILY MEDICINE

## 2024-05-22 ASSESSMENT — FIBROSIS 4 INDEX
FIB4 SCORE: 0.29
FIB4 SCORE: .56

## 2024-05-22 NOTE — PROGRESS NOTES
"Subjective:     CC: Chest pain    HPI:   Chelsea presents today follow-up on chest pain.  She was actually seen at the ER this morning for this.  Pain began last night.  Is located just over the sternum and is triggered with movement, breathing in, or lifting or moving arms.  It is present at rest as well, 6/10, significantly worsens with triggers.  No chest wall trauma.  No change activities yesterday.  Area is tender and pain is reproducible.  She does not have fevers or shortness of breath.  No heartburn.    Workup in the ER was reassuring including labs, D-dimer, EKG, CXR.      Medications, past medical history, allergies, and social history have been reviewed and updated.      Objective:       Exam:  /60 (BP Location: Right arm, Patient Position: Sitting, BP Cuff Size: Adult)   Pulse 86   Temp 36.8 °C (98.2 °F) (Temporal)   Resp 16   Ht 1.575 m (5' 2\")   Wt 93.9 kg (207 lb)   SpO2 96%   Breastfeeding Yes   BMI 37.86 kg/m²  Body mass index is 37.86 kg/m².    Constitutional: Alert. Well appearing. No distress.  Skin: Warm, dry, good turgor, no visible rashes.  ENMT: Moist mucous membranes. Normal dentition.  Respiratory: Normal effort. Lungs are clear to auscultation bilaterally.  Chest: Discrete tenderness over the sternum without overlying skin changes.  No obvious fluid collection, erythema.  Cardiovascular: Regular rate and rhythm. Normal S1/S2. No murmurs, rubs or gallops.   Neuro: Moves all four extremities. No facial droop.  Psych: Answers questions appropriately. Normal affect and mood.      Assessment & Plan:     29 y.o. female with the following -     1. Sternal pain    Presents with sternal pain that began last night.  Had reassuring workup in the ER this morning including D-dimer, labs, EKG, CXR.  Pain is triggered by movement and reproducible with palpation.  Suspect musculoskeletal source, likely sternal inflammation or costochondritis.  She has breast-feeding which somewhat limits " treatments.  Discussed ibuprofen up to 800 mg 3 times daily along with Tylenol up to 1000 mg 3 times daily.  She can also try topical Voltaren.  Follow-up if not improving.  Given additional ER precautions for new or concerning symptoms.      Please note that this note was created using voice recognition software.

## 2024-05-22 NOTE — ED PROVIDER NOTES
ED Provider Note    CHIEF COMPLAINT  Chief Complaint   Patient presents with    Chest Pain     Reporting mid chest pain that started last night and woke pt up from her sleep. Denies pain radiating anywhere. Describes the pain as stabbing. Hx of murmur. Pt reports mild SOB.        EXTERNAL RECORDS REVIEWED  Outpatient Notes outpatient renal orthopedic clinic visit in April for de Quervain's tenosynovitis    HPI/ROS  LIMITATION TO HISTORY   Select: : None  OUTSIDE HISTORIAN(S):  None      Chelsea Carrillo is a 29 y.o. female who presents to the emerged part with midsternal pleuritic chest pain.  Started last night and woke her from sleep.  No history of the same.  4 months postpartum.  Postpartum course has been well.  Continues to breast-feed.  Denies any recent illness or travel.  No significant dyspnea.  No fever or chills.  Does have an IUD in place.  No leg pain or swelling.  No history of DVT or PE.    PAST MEDICAL HISTORY   has a past medical history of Anxiety (2015), Asthma, Bronchitis, Depression (2015), Heart murmur, Influenza, Nasal drainage, and Pneumonia.    SURGICAL HISTORY   has a past surgical history that includes tonsillectomy and primary c section (N/A, 1/19/2024).    FAMILY HISTORY  Family History   Problem Relation Age of Onset    Sleep Apnea Mother     Sleep Apnea Father        SOCIAL HISTORY  Social History     Tobacco Use    Smoking status: Former    Smokeless tobacco: Never   Vaping Use    Vaping status: Former    Substances: Nicotine, THC   Substance and Sexual Activity    Alcohol use: Not Currently     Comment: occ    Drug use: Never    Sexual activity: Not on file       CURRENT MEDICATIONS  Home Medications       Reviewed by Skyla Baires R.N. (Registered Nurse) on 05/22/24 at 0641  Med List Status: Partial     Medication Last Dose Status   albuterol (PROVENTIL) 2.5mg/3ml Nebu Soln solution for nebulization  Active   albuterol 108 (90 Base) MCG/ACT Aero Soln inhalation aerosol  Active  "  EPINEPHrine (EPIPEN) 0.3 MG/0.3ML Solution Auto-injector solution for injection  Active   fluticasone-salmeterol (ADVAIR) 250-50 MCG/ACT AEROSOL POWDER, BREATH ACTIVATED  Active   ibuprofen (MOTRIN) 800 MG Tab  Active   influenza vaccine quad (FLUZONE QUADRIVALENT) 0.5 ML Suspension Prefilled Syringe injection  Active   insulin glargine (LANTUS SOLOSTAR) 100 UNIT/ML Solution Pen-injector injection  Active   insulin lispro (HUMALOG KWIKPEN) 100 UNIT/ML SC SOPN injection PEN  Active   metFORMIN (GLUCOPHAGE) 500 MG Tab  Active   sertraline (ZOLOFT) 25 MG tablet  Active   sertraline (ZOLOFT) 50 MG Tab  Active                    ALLERGIES  Allergies   Allergen Reactions    Penicillin G      Other reaction(s): unknown- reaction happened when she was a little girl    Tree Nuts Food Allergy Anaphylaxis, Anxiety, Hives, Itching, Shortness of Breath and Swelling    Pcn [Penicillins]      hives       PHYSICAL EXAM  VITAL SIGNS: /59   Pulse 80   Temp 36.2 °C (97.1 °F) (Temporal)   Resp 19   Ht 1.575 m (5' 2\")   Wt 94.3 kg (207 lb 14.3 oz)   SpO2 93%   Breastfeeding Yes   BMI 38.02 kg/m²          Pulse ox interpretation: I interpret this pulse ox as normal.  Constitutional: Alert in no apparent distress.  HENT: No signs of trauma, Bilateral external ears normal, Nose normal.   Eyes: Pupils are equal and reactive  Cardiovascular: Regular rate and rhythm, no murmurs.   Thorax & Lungs: Normal breath sounds, No respiratory distress, No wheezing, mild chest wall tenderness with deep palpation  Abdomen: Bowel sounds normal, Soft, No tenderness  Skin: Warm, Dry, No erythema, No rash.   Musculoskeletal: Good range of motion in all major joints. No tenderness to palpation or major deformities noted.   Neurologic: Alert , Normal motor function, Normal sensory function, No focal deficits noted.   Psychiatric: Affect normal, Judgment normal, Mood normal.         EKG/LABS  Results for orders placed or performed during the " hospital encounter of 05/22/24   CBC w/ Differential   Result Value Ref Range    WBC 10.6 4.8 - 10.8 K/uL    RBC 5.23 4.20 - 5.40 M/uL    Hemoglobin 14.3 12.0 - 16.0 g/dL    Hematocrit 42.7 37.0 - 47.0 %    MCV 81.6 81.4 - 97.8 fL    MCH 27.3 27.0 - 33.0 pg    MCHC 33.5 32.2 - 35.5 g/dL    RDW 54.2 (H) 35.9 - 50.0 fL    Platelet Count 385 164 - 446 K/uL    MPV 8.4 (L) 9.0 - 12.9 fL    Neutrophils-Polys 64.30 44.00 - 72.00 %    Lymphocytes 24.90 22.00 - 41.00 %    Monocytes 5.60 0.00 - 13.40 %    Eosinophils 4.20 0.00 - 6.90 %    Basophils 0.60 0.00 - 1.80 %    Immature Granulocytes 0.40 0.00 - 0.90 %    Nucleated RBC 0.00 0.00 - 0.20 /100 WBC    Neutrophils (Absolute) 6.80 1.82 - 7.42 K/uL    Lymphs (Absolute) 2.63 1.00 - 4.80 K/uL    Monos (Absolute) 0.59 0.00 - 0.85 K/uL    Eos (Absolute) 0.44 0.00 - 0.51 K/uL    Baso (Absolute) 0.06 0.00 - 0.12 K/uL    Immature Granulocytes (abs) 0.04 0.00 - 0.11 K/uL    NRBC (Absolute) 0.00 K/uL   Complete Metabolic Panel (CMP)   Result Value Ref Range    Sodium 140 135 - 145 mmol/L    Potassium 4.5 3.6 - 5.5 mmol/L    Chloride 103 96 - 112 mmol/L    Co2 25 20 - 33 mmol/L    Anion Gap 12.0 7.0 - 16.0    Glucose 92 65 - 99 mg/dL    Bun 18 8 - 22 mg/dL    Creatinine 0.61 0.50 - 1.40 mg/dL    Calcium 9.4 8.5 - 10.5 mg/dL    Correct Calcium 9.2 8.5 - 10.5 mg/dL    AST(SGOT) 20 12 - 45 U/L    ALT(SGPT) 26 2 - 50 U/L    Alkaline Phosphatase 126 (H) 30 - 99 U/L    Total Bilirubin 0.2 0.1 - 1.5 mg/dL    Albumin 4.2 3.2 - 4.9 g/dL    Total Protein 7.8 6.0 - 8.2 g/dL    Globulin 3.6 (H) 1.9 - 3.5 g/dL    A-G Ratio 1.2 g/dL   HCG Qual Serum   Result Value Ref Range    Beta-Hcg Qualitative Serum Negative Negative   Troponin - STAT Once   Result Value Ref Range    Troponin T <6 6 - 19 ng/L   D-Dimer (only helpful in low pre-test probability wells critieria. Do not order if patient ruled out by PERC criteria. See Weblinks at top of Labs section)   Result Value Ref Range    D-Dimer <0.27 0.00  - 0.50 ug/mL (FEU)   ESTIMATED GFR   Result Value Ref Range    GFR (CKD-EPI) 125 >60 mL/min/1.73 m 2   EKG (NOW)   Result Value Ref Range    Report       [facility] Emergency Dept[facility]    Test Date:  2024  Pt Name:    JAC MOLINA                Department: ER  MRN:        [id number]                      Room:  Gender:     Female                       Technician: [id number]  :        [id number]                   Requested By:ER TRIAGE PROTOCOL  Order #:    [id number]                    Reading MD: Rome Murphy    Measurements  Intervals                                Axis  Rate:       62                           P:          79  AZ:         146                          QRS:        59  QRSD:       88                           T:          44  QT:         402  QTc:        409    Interpretive Statements  Sinus rhythm  No previous ECG available for comparison  Electronically Signed On 2024 08:22:57 PDT by Rome Murphy         I have independently interpreted this EKG    RADIOLOGY/PROCEDURES   I have independently interpreted the diagnostic imaging associated with this visit and am waiting the final reading from the radiologist.   My preliminary interpretation is as follows: No pneumothorax or consolidative process    Radiologist interpretation:  DX-CHEST-PORTABLE (1 VIEW)   Final Result      1.  No acute cardiac or pulmonary abnormalities are identified.          COURSE & MEDICAL DECISION MAKING    ASSESSMENT, COURSE AND PLAN  Care Narrative:     DISPOSITION AND DISCUSSIONS  I have discussed management of the patient with the following physicians and VILMA's: None    Discussion of management with other Q or appropriate source(s): None     29-year-old female presenting to the emerged part with pleuritic chest pain.  History as above.  Workup reassuring.  Troponin negative with a low concern for ACS.  Heart score is low.  Similarly with the patient's pleuritic chest pain and complaint of dyspnea PE  was considered.  D-dimer negative.  Patient is low risk.  Will not escalate care to CTA imaging.  Will discharge home with ongoing home care understood.  Will follow-up with her PCP for outpatient care and follow-up.    FINAL DIAGNOSIS  1. Pleuritic chest pain           Electronically signed by: Rome Murphy M.D., 5/22/2024 7:28 AM

## 2024-05-22 NOTE — ED TRIAGE NOTES
"Chief Complaint   Patient presents with    Chest Pain     Reporting mid chest pain that started last night and woke pt up from her sleep. Denies pain radiating anywhere. Describes the pain as stabbing. Hx of murmur. Pt reports mild SOB.        Pt is alert and oriented, speaking in full sentences, follows commands and responds appropriately to questions. Resperations are even and unlabored.      Pt placed in lobby. Pt educated on triage process. Pt encouraged to alert staff for any changes.     /78   Pulse 75   Temp 36.6 °C (97.8 °F) (Oral)   Resp 18   Ht 1.575 m (5' 2\")   Wt 94.3 kg (207 lb 14.3 oz)   SpO2 95%    "

## 2024-05-22 NOTE — ED NOTES
Bedside report received, assuming care.  Patient resting on gurney, bed in lowest position, no distress noted.  Patient on continuous monitor.  Will continue to assess and monitor.  ERP at the bedside.

## 2024-07-28 PROCEDURE — RXMED WILLOW AMBULATORY MEDICATION CHARGE: Performed by: FAMILY MEDICINE

## 2024-07-29 ENCOUNTER — PHARMACY VISIT (OUTPATIENT)
Dept: PHARMACY | Facility: MEDICAL CENTER | Age: 29
End: 2024-07-29
Payer: COMMERCIAL

## 2024-07-29 ENCOUNTER — OFFICE VISIT (OUTPATIENT)
Dept: SLEEP MEDICINE | Facility: MEDICAL CENTER | Age: 29
End: 2024-07-29
Attending: NURSE PRACTITIONER
Payer: COMMERCIAL

## 2024-07-29 VITALS
DIASTOLIC BLOOD PRESSURE: 60 MMHG | RESPIRATION RATE: 16 BRPM | HEIGHT: 62 IN | SYSTOLIC BLOOD PRESSURE: 106 MMHG | HEART RATE: 74 BPM | OXYGEN SATURATION: 91 % | WEIGHT: 214 LBS | BODY MASS INDEX: 39.38 KG/M2

## 2024-07-29 DIAGNOSIS — G47.33 OSA (OBSTRUCTIVE SLEEP APNEA): ICD-10-CM

## 2024-07-29 PROCEDURE — 99213 OFFICE O/P EST LOW 20 MIN: CPT | Performed by: NURSE PRACTITIONER

## 2024-07-29 ASSESSMENT — FIBROSIS 4 INDEX: FIB4 SCORE: 0.29

## 2024-10-14 PROCEDURE — RXMED WILLOW AMBULATORY MEDICATION CHARGE: Performed by: INTERNAL MEDICINE

## 2024-10-15 ENCOUNTER — PHARMACY VISIT (OUTPATIENT)
Dept: PHARMACY | Facility: MEDICAL CENTER | Age: 29
End: 2024-10-15
Payer: COMMERCIAL

## 2024-10-26 PROCEDURE — RXMED WILLOW AMBULATORY MEDICATION CHARGE: Performed by: FAMILY MEDICINE

## 2024-10-30 ENCOUNTER — PHARMACY VISIT (OUTPATIENT)
Dept: PHARMACY | Facility: MEDICAL CENTER | Age: 29
End: 2024-10-30
Payer: COMMERCIAL

## 2024-11-01 ENCOUNTER — APPOINTMENT (OUTPATIENT)
Dept: MEDICAL GROUP | Facility: LAB | Age: 29
End: 2024-11-01
Payer: COMMERCIAL

## 2024-11-13 ENCOUNTER — OFFICE VISIT (OUTPATIENT)
Dept: MEDICAL GROUP | Facility: LAB | Age: 29
End: 2024-11-13
Payer: COMMERCIAL

## 2024-11-13 VITALS
HEART RATE: 100 BPM | DIASTOLIC BLOOD PRESSURE: 66 MMHG | TEMPERATURE: 97 F | SYSTOLIC BLOOD PRESSURE: 102 MMHG | RESPIRATION RATE: 12 BRPM | WEIGHT: 216.4 LBS | BODY MASS INDEX: 39.82 KG/M2 | HEIGHT: 62 IN | OXYGEN SATURATION: 96 %

## 2024-11-13 DIAGNOSIS — L68.0 HIRSUTISM: ICD-10-CM

## 2024-11-13 DIAGNOSIS — Z13.1 SCREENING FOR DIABETES MELLITUS: ICD-10-CM

## 2024-11-13 PROCEDURE — 3078F DIAST BP <80 MM HG: CPT | Performed by: FAMILY MEDICINE

## 2024-11-13 PROCEDURE — 99214 OFFICE O/P EST MOD 30 MIN: CPT | Performed by: FAMILY MEDICINE

## 2024-11-13 PROCEDURE — 3074F SYST BP LT 130 MM HG: CPT | Performed by: FAMILY MEDICINE

## 2024-11-13 ASSESSMENT — FIBROSIS 4 INDEX: FIB4 SCORE: 0.29

## 2024-11-13 NOTE — PROGRESS NOTES
Subjective:     CC: Hormones    HPI:   Chelsea presents today because hormones.  She is concerned because she has noticed increasing facial hair over the last year.  She also reports trouble conceiving in the past.  Periods are regular.  Has not previously had evaluation for PCOS.  Does also report trouble losing weight.  Has Mirena for birth control.      Current Outpatient Medications   Medication Sig Dispense Refill    influenza vaccine (FLUZONE) 0.5 ML Suspension Prefilled Syringe injection Inject 0.5 mL into the shoulder, thigh, or buttocks. 0.5 mL 0    albuterol (PROVENTIL) 2.5mg/3ml Nebu Soln solution for nebulization Take 3 mL by nebulization every four hours as needed for Shortness of Breath. 90 mL 0    albuterol 108 (90 Base) MCG/ACT Aero Soln inhalation aerosol Inhale 2 Puffs every four hours as needed for Shortness of Breath. 8.5 g 3    EPINEPHrine (EPIPEN) 0.3 MG/0.3ML Solution Auto-injector solution for injection Inject 0.3 mL into the thigh one time for 1 dose 2 Each 0    fluticasone-salmeterol (ADVAIR) 250-50 MCG/ACT AEROSOL POWDER, BREATH ACTIVATED Inhale 1 Puff every 12 hours. 60 Each 3    sertraline (ZOLOFT) 25 MG tablet Take 1 Tablet by mouth every day. Take with 50 mg tab for a total of 75 mg daily 90 Tablet 3    sertraline (ZOLOFT) 50 MG Tab Take 1 Tablet by mouth every day. Take with 25 mg tab for a total of 75 mg daily 90 Tablet 3    ibuprofen (MOTRIN) 800 MG Tab Take 1 Tablet by mouth every 8 hours. 30 Tablet 1    influenza vaccine quad (FLUZONE QUADRIVALENT) 0.5 ML Suspension Prefilled Syringe injection Inject 0.5 ml into the shoulder, thigh, or buttocks. 0.5 mL 0     No current facility-administered medications for this visit.       Medications, past medical history, allergies, and social history have been reviewed and updated.      Objective:       Exam:  /66 (BP Location: Left arm, Patient Position: Sitting, BP Cuff Size: Adult)   Pulse 100   Temp 36.1 °C (97 °F) (Temporal)    "Resp 12   Ht 1.575 m (5' 2\")   Wt 98.2 kg (216 lb 6.4 oz)   LMP 11/13/2024 Comment: On and off menstural periods, has IUD.  SpO2 96%   Breastfeeding No   BMI 39.58 kg/m²  Body mass index is 39.58 kg/m².    Constitutional: Alert. Well appearing. No distress.  Skin: Facial hair noted to the chin.  ENMT: Moist mucous membranes. Normal dentition.  Respiratory: Normal effort.  Neuro: Moves all four extremities. No facial droop.  Psych: Answers questions appropriately. Normal affect and mood.      Assessment & Plan:     29 y.o. female with the following -     1. Hirsutism  Concern for PCOS with hirsutism, previous history with difficulty conceiving, trouble losing weight.  No prior workup.  Will check labs and ultrasound as below.  I also recommended follow-up with OB/GYN.  - TESTOSTERONE,TOTAL FEM/CHILD; Future  - TSH WITH REFLEX TO FT4; Future  - 17-OH PROGESTERONE; Future  - US-PELVIC COMPLETE (TRANSABDOMINAL/TRANSVAGINAL) (COMBO); Future    2. Screening for diabetes mellitus  - HEMOGLOBIN A1C; Future      Please note that this note was created using voice recognition software.      "

## 2024-11-29 ENCOUNTER — OFFICE VISIT (OUTPATIENT)
Dept: URGENT CARE | Facility: PHYSICIAN GROUP | Age: 29
End: 2024-11-29
Payer: COMMERCIAL

## 2024-11-29 VITALS
TEMPERATURE: 97.4 F | HEIGHT: 62 IN | RESPIRATION RATE: 16 BRPM | HEART RATE: 85 BPM | SYSTOLIC BLOOD PRESSURE: 124 MMHG | DIASTOLIC BLOOD PRESSURE: 70 MMHG | OXYGEN SATURATION: 98 % | WEIGHT: 217 LBS | BODY MASS INDEX: 39.93 KG/M2

## 2024-11-29 DIAGNOSIS — H92.03 ACUTE OTALGIA, BILATERAL: ICD-10-CM

## 2024-11-29 DIAGNOSIS — R05.1 ACUTE COUGH: ICD-10-CM

## 2024-11-29 DIAGNOSIS — H66.92 ACUTE BACTERIAL OTITIS MEDIA, LEFT: ICD-10-CM

## 2024-11-29 DIAGNOSIS — H57.89 EYE SWELLING, RIGHT: ICD-10-CM

## 2024-11-29 DIAGNOSIS — H10.9 BACTERIAL CONJUNCTIVITIS OF RIGHT EYE: ICD-10-CM

## 2024-11-29 RX ORDER — POLYMYXIN B SULFATE AND TRIMETHOPRIM 1; 10000 MG/ML; [USP'U]/ML
1 SOLUTION OPHTHALMIC EVERY 4 HOURS
Qty: 10 ML | Refills: 0 | Status: SHIPPED | OUTPATIENT
Start: 2024-11-29 | End: 2024-12-06

## 2024-11-29 RX ORDER — DOXYCYCLINE HYCLATE 100 MG
100 TABLET ORAL 2 TIMES DAILY
Qty: 14 TABLET | Refills: 0 | Status: SHIPPED | OUTPATIENT
Start: 2024-11-29 | End: 2024-12-06

## 2024-11-29 ASSESSMENT — FIBROSIS 4 INDEX: FIB4 SCORE: 0.29

## 2024-11-30 NOTE — PROGRESS NOTES
Subjective:   Chelsea Carrillo is a 29 y.o. female who presents for Otalgia and Congestion (X 3 days)       HPI  Patient is a pleasant 29 y.o. who presents for evaluation of 3-day history of bilateral otalgia, cough, sore throat, hoarse voice, and nasal congestion.  Patient has also had associated 24-hour history of right eye redness, swelling, and tearing, after her 10-month-old daughter accidentally poked her in the eye.  Patient is unaware of known ill contacts or exposures.  Has taken several over-the-counter cough and cold medication and decongestants without noticing any type of relief.    ROS  All other systems are negative except as documented above within HPI.    MEDS:   Current Outpatient Medications:     albuterol (PROVENTIL) 2.5mg/3ml Nebu Soln solution for nebulization, Take 3 mL by nebulization every four hours as needed for Shortness of Breath., Disp: 90 mL, Rfl: 0    albuterol 108 (90 Base) MCG/ACT Aero Soln inhalation aerosol, Inhale 2 Puffs every four hours as needed for Shortness of Breath., Disp: 8.5 g, Rfl: 3    EPINEPHrine (EPIPEN) 0.3 MG/0.3ML Solution Auto-injector solution for injection, Inject 0.3 mL into the thigh one time for 1 dose, Disp: 2 Each, Rfl: 0    sertraline (ZOLOFT) 25 MG tablet, Take 1 Tablet by mouth every day. Take with 50 mg tab for a total of 75 mg daily, Disp: 90 Tablet, Rfl: 3    sertraline (ZOLOFT) 50 MG Tab, Take 1 Tablet by mouth every day. Take with 25 mg tab for a total of 75 mg daily, Disp: 90 Tablet, Rfl: 3    influenza vaccine (FLUZONE) 0.5 ML Suspension Prefilled Syringe injection, Inject 0.5 mL into the shoulder, thigh, or buttocks. (Patient not taking: Reported on 11/29/2024), Disp: 0.5 mL, Rfl: 0    fluticasone-salmeterol (ADVAIR) 250-50 MCG/ACT AEROSOL POWDER, BREATH ACTIVATED, Inhale 1 Puff every 12 hours. (Patient not taking: Reported on 11/29/2024), Disp: 60 Each, Rfl: 3    ibuprofen (MOTRIN) 800 MG Tab, Take 1 Tablet by mouth every 8 hours. (Patient not  "taking: Reported on 11/29/2024), Disp: 30 Tablet, Rfl: 1    influenza vaccine quad (FLUZONE QUADRIVALENT) 0.5 ML Suspension Prefilled Syringe injection, Inject 0.5 ml into the shoulder, thigh, or buttocks. (Patient not taking: Reported on 11/29/2024), Disp: 0.5 mL, Rfl: 0  ALLERGIES:   Allergies   Allergen Reactions    Penicillin G      Other reaction(s): unknown- reaction happened when she was a little girl    Tree Nuts Food Allergy Anaphylaxis, Anxiety, Hives, Itching, Shortness of Breath and Swelling    Pcn [Penicillins]      hives       Patient's PMH, SocHx, SurgHx, FamHx, Drug allergies and medications were reviewed.     Objective:   /70   Pulse 85   Temp 36.3 °C (97.4 °F) (Temporal)   Resp 16   Ht 1.575 m (5' 2\")   Wt 98.4 kg (217 lb)   LMP 11/13/2024 Comment: On and off menstural periods, has IUD.  SpO2 98%   BMI 39.69 kg/m²     Physical Exam  Vitals and nursing note reviewed.   Constitutional:       General: She is awake.      Appearance: Normal appearance. She is well-developed.   HENT:      Head: Normocephalic and atraumatic.      Right Ear: Tympanic membrane, ear canal and external ear normal.      Left Ear: Tympanic membrane, ear canal and external ear normal.      Nose: Nose normal. No nasal tenderness, mucosal edema, congestion or rhinorrhea.      Right Turbinates: Enlarged.      Left Turbinates: Enlarged.      Mouth/Throat:      Lips: Pink.      Mouth: Mucous membranes are moist.      Pharynx: Oropharynx is clear. Uvula midline. Posterior oropharyngeal erythema present.   Eyes:      Extraocular Movements: Extraocular movements intact.      Conjunctiva/sclera:      Right eye: Right conjunctiva is injected.      Comments: +tearing   Cardiovascular:      Rate and Rhythm: Normal rate and regular rhythm.      Pulses: Normal pulses.      Heart sounds: Normal heart sounds.   Pulmonary:      Effort: Pulmonary effort is normal.      Breath sounds: Normal breath sounds.   Musculoskeletal:         " General: Normal range of motion.      Cervical back: Normal range of motion and neck supple.   Skin:     General: Skin is warm and dry.   Neurological:      General: No focal deficit present.      Mental Status: She is alert and oriented to person, place, and time.   Psychiatric:         Mood and Affect: Mood normal.         Behavior: Behavior normal. Behavior is cooperative.         Thought Content: Thought content normal.         Judgment: Judgment normal.         Assessment/Plan:   Assessment    1. Bacterial conjunctivitis of right eye  - polymixin-trimethoprim (POLYTRIM) 39357-2.1 UNIT/ML-% Solution; Administer 1 Drop into both eyes every 4 hours for 7 days.  Dispense: 10 mL; Refill: 0    2. Acute bacterial otitis media, left  - doxycycline (VIBRAMYCIN) 100 MG Tab; Take 1 Tablet by mouth 2 times a day for 7 days.  Dispense: 14 Tablet; Refill: 0    3. Eye swelling, right    4. Acute cough    5. Acute otalgia, bilateral      Vital signs stable at today's acute urgent care visit.  Begin medications as listed. Recommend alternating OTC NSAIDs as needed for symptomatic relief.    Advised the patient to follow-up with the primary care provider if symptoms persist.  Red flags discussed and indications to immediately call 911 or present to the ED. All questions were encouraged and answered to the patient's satisfaction and understanding, and they agree to the plan of care.     This is an acute problem with uncertain prognosis, medication management and instructions as well as management options were provided.  I personally reviewed prior external notes and test results pertinent to today and independently reviewed and interpreted all diagnostics, to include POCT testing. Time spent evaluating this patient includes preparing for visit, counseling/education, exam, evaluation, obtaining history, and ordering lab/test/procedures.      Please note that this dictation was created using voice recognition software. I have made  a reasonable attempt to correct obvious errors, but I expect that there are errors of grammar and possibly content that I did not discover before finalizing the note.

## 2024-12-19 ENCOUNTER — OFFICE VISIT (OUTPATIENT)
Dept: URGENT CARE | Facility: PHYSICIAN GROUP | Age: 29
End: 2024-12-19

## 2024-12-19 VITALS
BODY MASS INDEX: 40.59 KG/M2 | DIASTOLIC BLOOD PRESSURE: 72 MMHG | HEIGHT: 61 IN | SYSTOLIC BLOOD PRESSURE: 130 MMHG | RESPIRATION RATE: 20 BRPM | TEMPERATURE: 97.2 F | WEIGHT: 215 LBS | HEART RATE: 90 BPM

## 2024-12-19 DIAGNOSIS — H66.001 NON-RECURRENT ACUTE SUPPURATIVE OTITIS MEDIA OF RIGHT EAR WITHOUT SPONTANEOUS RUPTURE OF TYMPANIC MEMBRANE: ICD-10-CM

## 2024-12-19 PROCEDURE — 99213 OFFICE O/P EST LOW 20 MIN: CPT | Performed by: NURSE PRACTITIONER

## 2024-12-19 PROCEDURE — 3075F SYST BP GE 130 - 139MM HG: CPT | Performed by: NURSE PRACTITIONER

## 2024-12-19 PROCEDURE — 3078F DIAST BP <80 MM HG: CPT | Performed by: NURSE PRACTITIONER

## 2024-12-19 RX ORDER — FLUTICASONE PROPIONATE AND SALMETEROL 250; 50 UG/1; UG/1
1 POWDER RESPIRATORY (INHALATION) EVERY 12 HOURS
COMMUNITY

## 2024-12-19 RX ORDER — PREDNISONE 20 MG/1
40 TABLET ORAL DAILY
Qty: 10 TABLET | Refills: 0 | Status: SHIPPED | OUTPATIENT
Start: 2024-12-19 | End: 2024-12-24

## 2024-12-19 RX ORDER — CEFDINIR 300 MG/1
300 CAPSULE ORAL 2 TIMES DAILY
Qty: 14 CAPSULE | Refills: 0 | Status: SHIPPED | OUTPATIENT
Start: 2024-12-19 | End: 2024-12-26

## 2024-12-19 ASSESSMENT — ENCOUNTER SYMPTOMS
COUGH: 1
ABDOMINAL PAIN: 0
TROUBLE SWALLOWING: 1
VOMITING: 0
SORE THROAT: 1
SWOLLEN GLANDS: 1
MYALGIAS: 0
FEVER: 1
HOARSE VOICE: 1
DIARRHEA: 0
NAUSEA: 0

## 2024-12-19 ASSESSMENT — FIBROSIS 4 INDEX: FIB4 SCORE: 0.29

## 2024-12-20 NOTE — PROGRESS NOTES
Subjective:     Chelsea Carrillo is a 29 y.o. female who presents for Ear Pain (Right pain, pressure and muffled x last night ) and Pharyngitis (X 1 week)      Pharyngitis   This is a new problem. The current episode started in the past 7 days (Erica is a pleasant 29 year old female who presents to  today with complaints of a sore throat and  right sided ear pain that started 3 days ago). The problem has been unchanged. The pain is worse on the right side. There has been no fever. Associated symptoms include coughing, ear pain, a hoarse voice, swollen glands and trouble swallowing. Pertinent negatives include no abdominal pain, congestion, diarrhea or vomiting. She has tried gargles for the symptoms.         Review of Systems   Constitutional:  Positive for fever and malaise/fatigue.   HENT:  Positive for ear pain, hoarse voice, sore throat and trouble swallowing. Negative for congestion.    Respiratory:  Positive for cough.    Gastrointestinal:  Negative for abdominal pain, diarrhea, nausea and vomiting.   Musculoskeletal:  Negative for myalgias.       PMH:   Past Medical History:   Diagnosis Date    Anxiety     Asthma     Bronchitis     Depression     Heart murmur     Influenza     Nasal drainage     Pneumonia      ALLERGIES:   Allergies   Allergen Reactions    Penicillin G      Other reaction(s): unknown- reaction happened when she was a little girl    Tree Nuts Food Allergy Anaphylaxis, Anxiety, Hives, Itching, Shortness of Breath and Swelling    Pcn [Penicillins]      hives     SURGHX:   Past Surgical History:   Procedure Laterality Date    PRIMARY C SECTION N/A 2024    Procedure:  SECTION, PRIMARY;  Surgeon: Nahomi Franco M.D.;  Location: [facility];  Service: Obstetrics    TONSILLECTOMY       SOCHX:   Social History     Socioeconomic History    Marital status:     Highest education level: Some college, no degree   Tobacco Use    Smoking status: Former    Smokeless  tobacco: Never   Vaping Use    Vaping status: Former    Substances: Nicotine, THC   Substance and Sexual Activity    Alcohol use: Not Currently     Comment: occ    Drug use: Never   Social History Narrative    ** Merged History Encounter **         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     Social Drivers of Health     Financial Resource Strain: Low Risk  (1/17/2024)    Overall Financial Resource Strain (CARDIA)     Difficulty of Paying Living Expenses: Not hard at all   Food Insecurity: No Food Insecurity (1/17/2024)    Hunger Vital Sign     Worried About Running Out of Food in the Last Year: Never true     Ran Out of Food in the Last Year: Never true   Transportation Needs: No Transportation Needs (1/17/2024)    PRAPARE - Transportation     Lack of Transportation (Medical): No     Lack of Transportation (Non-Medical): No   Physical Activity: Insufficiently Active (1/17/2024)    Exercise Vital Sign     Days of Exercise per Week: 2 days     Minutes of Exercise per Session: 20 min   Stress: Stress Concern Present (1/17/2024)    [place] [employer] - Occupational Stress Questionnaire     Feeling of Stress : To some extent   Social Connections: Unknown (1/17/2024)    Social Connection and Isolation Panel [NHANES]     Frequency of Communication with Friends and Family: More than three times a week     Frequency of Social Gatherings with Friends and Family: Once a week     Attends [employer] Services: Patient declined     Active Member of Clubs or Organizations: No     Attends Club or Organization Meetings: Patient declined     Marital Status:    Housing Stability: Low Risk  (1/17/2024)    Housing Stability Vital Sign     Unable to Pay for Housing in the Last Year: No     Number of Places Lived in the Last Year: 1     Unstable Housing in the Last Year: No     FH:   Family History   Problem Relation Age of Onset    Sleep Apnea Mother     Sleep Apnea Father          Objective:   /72   Pulse  "90   Temp 36.2 °C (97.2 °F) (Temporal)   Resp 20   Ht 1.557 m (5' 1.3\")   Wt 97.5 kg (215 lb)   BMI 40.23 kg/m²     Physical Exam  Vitals and nursing note reviewed.   Constitutional:       General: She is not in acute distress.     Appearance: Normal appearance. She is ill-appearing.   HENT:      Head: Normocephalic and atraumatic.      Right Ear: Hearing, ear canal and external ear normal. Swelling and tenderness present. Tympanic membrane is injected and erythematous.      Left Ear: Hearing, tympanic membrane, ear canal and external ear normal.      Nose: No congestion or rhinorrhea.      Mouth/Throat:      Mouth: Mucous membranes are moist.   Eyes:      Extraocular Movements: Extraocular movements intact.      Pupils: Pupils are equal, round, and reactive to light.   Cardiovascular:      Rate and Rhythm: Normal rate and regular rhythm.      Pulses: Normal pulses.      Heart sounds: Normal heart sounds.   Pulmonary:      Effort: Pulmonary effort is normal.      Breath sounds: Normal breath sounds.   Abdominal:      General: Abdomen is flat. Bowel sounds are normal.      Palpations: Abdomen is soft.      Tenderness: There is abdominal tenderness. There is no right CVA tenderness or left CVA tenderness.   Musculoskeletal:         General: Normal range of motion.      Cervical back: Normal range of motion and neck supple. Tenderness present.   Lymphadenopathy:      Cervical: Cervical adenopathy present.   Skin:     General: Skin is warm and dry.      Capillary Refill: Capillary refill takes less than 2 seconds.   Neurological:      General: No focal deficit present.      Mental Status: She is alert and oriented to person, place, and time. Mental status is at baseline.   Psychiatric:         Mood and Affect: Mood normal.         Behavior: Behavior normal.         Thought Content: Thought content normal.         Judgment: Judgment normal.         Assessment/Plan:   Assessment    1. Non-recurrent acute suppurative " otitis media of right ear without spontaneous rupture of tympanic membrane  cefdinir (OMNICEF) 300 MG Cap    predniSONE (DELTASONE) 20 MG Tab        Patient is suffering from otitis media of right ear.  She was prescribed cefdinir for treatment of infection.  Prednisone was also prescribed for relief of inflammation and pain as over-the-counter ibuprofen and Tylenol have not been beneficial.  Supportive treatments discussed.  Patient to return for worsening or persistent symptoms.

## 2025-01-28 PROCEDURE — RXMED WILLOW AMBULATORY MEDICATION CHARGE: Performed by: FAMILY MEDICINE

## 2025-01-29 ENCOUNTER — PHARMACY VISIT (OUTPATIENT)
Dept: PHARMACY | Facility: MEDICAL CENTER | Age: 30
End: 2025-01-29
Payer: COMMERCIAL

## 2025-01-29 PROCEDURE — RXOTC WILLOW AMBULATORY OTC CHARGE

## 2025-02-18 ENCOUNTER — APPOINTMENT (OUTPATIENT)
Dept: MEDICAL GROUP | Facility: LAB | Age: 30
End: 2025-02-18

## 2025-02-26 ENCOUNTER — APPOINTMENT (OUTPATIENT)
Dept: MEDICAL GROUP | Facility: LAB | Age: 30
End: 2025-02-26

## 2025-03-12 ENCOUNTER — APPOINTMENT (OUTPATIENT)
Dept: MEDICAL GROUP | Facility: LAB | Age: 30
End: 2025-03-12

## 2025-03-12 ENCOUNTER — HOSPITAL ENCOUNTER (OUTPATIENT)
Dept: LAB | Facility: MEDICAL CENTER | Age: 30
End: 2025-03-12
Attending: FAMILY MEDICINE
Payer: COMMERCIAL

## 2025-03-12 VITALS
TEMPERATURE: 97.8 F | SYSTOLIC BLOOD PRESSURE: 100 MMHG | HEIGHT: 62 IN | OXYGEN SATURATION: 94 % | DIASTOLIC BLOOD PRESSURE: 68 MMHG | WEIGHT: 212 LBS | HEART RATE: 92 BPM | RESPIRATION RATE: 16 BRPM | BODY MASS INDEX: 39.01 KG/M2

## 2025-03-12 DIAGNOSIS — F33.0 MILD EPISODE OF RECURRENT MAJOR DEPRESSIVE DISORDER (HCC): ICD-10-CM

## 2025-03-12 DIAGNOSIS — F41.1 GAD (GENERALIZED ANXIETY DISORDER): ICD-10-CM

## 2025-03-12 DIAGNOSIS — Z00.00 WELL ADULT EXAM: ICD-10-CM

## 2025-03-12 DIAGNOSIS — Z13.1 SCREENING FOR DIABETES MELLITUS: ICD-10-CM

## 2025-03-12 DIAGNOSIS — L68.0 HIRSUTISM: ICD-10-CM

## 2025-03-12 LAB
ALBUMIN SERPL BCP-MCNC: 4.2 G/DL (ref 3.2–4.9)
ALBUMIN/GLOB SERPL: 1.1 G/DL
ALP SERPL-CCNC: 100 U/L (ref 30–99)
ALT SERPL-CCNC: 40 U/L (ref 2–50)
ANION GAP SERPL CALC-SCNC: 10 MMOL/L (ref 7–16)
AST SERPL-CCNC: 23 U/L (ref 12–45)
BASOPHILS # BLD AUTO: 0.3 % (ref 0–1.8)
BASOPHILS # BLD: 0.04 K/UL (ref 0–0.12)
BILIRUB SERPL-MCNC: 0.4 MG/DL (ref 0.1–1.5)
BUN SERPL-MCNC: 13 MG/DL (ref 8–22)
CALCIUM ALBUM COR SERPL-MCNC: 9.2 MG/DL (ref 8.5–10.5)
CALCIUM SERPL-MCNC: 9.4 MG/DL (ref 8.5–10.5)
CHLORIDE SERPL-SCNC: 102 MMOL/L (ref 96–112)
CHOLEST SERPL-MCNC: 175 MG/DL (ref 100–199)
CO2 SERPL-SCNC: 24 MMOL/L (ref 20–33)
CREAT SERPL-MCNC: 0.69 MG/DL (ref 0.5–1.4)
EOSINOPHIL # BLD AUTO: 0.35 K/UL (ref 0–0.51)
EOSINOPHIL NFR BLD: 2.8 % (ref 0–6.9)
ERYTHROCYTE [DISTWIDTH] IN BLOOD BY AUTOMATED COUNT: 45.7 FL (ref 35.9–50)
EST. AVERAGE GLUCOSE BLD GHB EST-MCNC: 128 MG/DL
FASTING STATUS PATIENT QL REPORTED: NORMAL
GFR SERPLBLD CREATININE-BSD FMLA CKD-EPI: 120 ML/MIN/1.73 M 2
GLOBULIN SER CALC-MCNC: 3.8 G/DL (ref 1.9–3.5)
GLUCOSE SERPL-MCNC: 110 MG/DL (ref 65–99)
HBA1C MFR BLD: 6.1 % (ref 4–5.6)
HCT VFR BLD AUTO: 44.8 % (ref 37–47)
HDLC SERPL-MCNC: 34 MG/DL
HGB BLD-MCNC: 14.7 G/DL (ref 12–16)
IMM GRANULOCYTES # BLD AUTO: 0.04 K/UL (ref 0–0.11)
IMM GRANULOCYTES NFR BLD AUTO: 0.3 % (ref 0–0.9)
LDLC SERPL CALC-MCNC: 120 MG/DL
LYMPHOCYTES # BLD AUTO: 2.54 K/UL (ref 1–4.8)
LYMPHOCYTES NFR BLD: 20.1 % (ref 22–41)
MCH RBC QN AUTO: 27.3 PG (ref 27–33)
MCHC RBC AUTO-ENTMCNC: 32.8 G/DL (ref 32.2–35.5)
MCV RBC AUTO: 83.3 FL (ref 81.4–97.8)
MONOCYTES # BLD AUTO: 0.53 K/UL (ref 0–0.85)
MONOCYTES NFR BLD AUTO: 4.2 % (ref 0–13.4)
NEUTROPHILS # BLD AUTO: 9.13 K/UL (ref 1.82–7.42)
NEUTROPHILS NFR BLD: 72.3 % (ref 44–72)
NRBC # BLD AUTO: 0 K/UL
NRBC BLD-RTO: 0 /100 WBC (ref 0–0.2)
PLATELET # BLD AUTO: 437 K/UL (ref 164–446)
PMV BLD AUTO: 8.4 FL (ref 9–12.9)
POTASSIUM SERPL-SCNC: 4.5 MMOL/L (ref 3.6–5.5)
PROT SERPL-MCNC: 8 G/DL (ref 6–8.2)
RBC # BLD AUTO: 5.38 M/UL (ref 4.2–5.4)
SODIUM SERPL-SCNC: 136 MMOL/L (ref 135–145)
TRIGL SERPL-MCNC: 103 MG/DL (ref 0–149)
TSH SERPL DL<=0.005 MIU/L-ACNC: 0.69 UIU/ML (ref 0.38–5.33)
WBC # BLD AUTO: 12.6 K/UL (ref 4.8–10.8)

## 2025-03-12 PROCEDURE — 36415 COLL VENOUS BLD VENIPUNCTURE: CPT

## 2025-03-12 PROCEDURE — 85025 COMPLETE CBC W/AUTO DIFF WBC: CPT

## 2025-03-12 PROCEDURE — 80053 COMPREHEN METABOLIC PANEL: CPT

## 2025-03-12 PROCEDURE — 83036 HEMOGLOBIN GLYCOSYLATED A1C: CPT

## 2025-03-12 PROCEDURE — 84403 ASSAY OF TOTAL TESTOSTERONE: CPT

## 2025-03-12 PROCEDURE — 84443 ASSAY THYROID STIM HORMONE: CPT

## 2025-03-12 PROCEDURE — 83498 ASY HYDROXYPROGESTERONE 17-D: CPT

## 2025-03-12 PROCEDURE — 80061 LIPID PANEL: CPT

## 2025-03-12 PROCEDURE — 99214 OFFICE O/P EST MOD 30 MIN: CPT | Performed by: FAMILY MEDICINE

## 2025-03-12 PROCEDURE — 3074F SYST BP LT 130 MM HG: CPT | Performed by: FAMILY MEDICINE

## 2025-03-12 PROCEDURE — 3078F DIAST BP <80 MM HG: CPT | Performed by: FAMILY MEDICINE

## 2025-03-12 RX ORDER — SERTRALINE HYDROCHLORIDE 100 MG/1
100 TABLET, FILM COATED ORAL DAILY
Qty: 90 TABLET | Refills: 3
Start: 2025-03-12

## 2025-03-12 ASSESSMENT — ANXIETY QUESTIONNAIRES
7. FEELING AFRAID AS IF SOMETHING AWFUL MIGHT HAPPEN: NOT AT ALL
3. WORRYING TOO MUCH ABOUT DIFFERENT THINGS: MORE THAN HALF THE DAYS
5. BEING SO RESTLESS THAT IT IS HARD TO SIT STILL: MORE THAN HALF THE DAYS
6. BECOMING EASILY ANNOYED OR IRRITABLE: MORE THAN HALF THE DAYS
4. TROUBLE RELAXING: MORE THAN HALF THE DAYS
GAD7 TOTAL SCORE: 11
2. NOT BEING ABLE TO STOP OR CONTROL WORRYING: SEVERAL DAYS
1. FEELING NERVOUS, ANXIOUS, OR ON EDGE: MORE THAN HALF THE DAYS

## 2025-03-12 ASSESSMENT — PATIENT HEALTH QUESTIONNAIRE - PHQ9
CLINICAL INTERPRETATION OF PHQ2 SCORE: 2
SUM OF ALL RESPONSES TO PHQ QUESTIONS 1-9: 8
5. POOR APPETITE OR OVEREATING: 0 - NOT AT ALL

## 2025-03-12 ASSESSMENT — FIBROSIS 4 INDEX: FIB4 SCORE: 0.3

## 2025-03-12 NOTE — PROGRESS NOTES
Subjective:     CC: Mood    HPI:   Chelsea presents today with worsening mood symptoms and to discuss changing medications.  History of anxiety and depression with worsening symptoms postpartum.  Very high stress recently due to daughter being sick and in the hospital multiple times, also high stress at work.  Having trouble focusing at work.  Continues to see therapist.  Overall feeling overwhelmed and exhausted.  Has been on Zoloft for anxiety and depression for years has seemed to work well for her.  Currently on 75 mg daily.    JUAN-7 Questionnaire    Feeling nervous, anxious, or on edge: More than half the days  Not being able to sop or control worrying: Several days  Worrying too much about different things: More than half the days  Trouble relaxing: More than half the days  Being so restless that it's hard to sit still: More than half the days  Becoming easily annoyed or irritable: More than half the days  Feeling afraid as if something awful might happen: Not at all  Total: 11    Interpretation of JUAN 7 Total Score   Score Severity :  0-4 No Anxiety   5-9 Mild Anxiety  10-14 Moderate Anxiety  15-21 Severe Anxiety     Depression Screening    Little interest or pleasure in doing things?  1 - several days  Feeling down, depressed , or hopeless? 1 - several days  Trouble falling or staying asleep, or sleeping too much?  0 - not at all  Feeling tired or having little energy?  2 - more than half the days  Poor appetite or overeating?  0 - not at all  Feeling bad about yourself - or that you are a failure or have let yourself or your family down? 0 - not at all  Trouble concentrating on things, such as reading the newspaper or watching television? 2 - more than half the days  Moving or speaking so slowly that other people could have noticed.  Or the opposite - being so fidgety or restless that you have been moving around a lot more than usual?  2 - more than half the days  Thoughts that you would be better off  "dead, or of hurting yourself?  0 - not at all  Patient Health Questionnaire Score: 8      If depressive symptoms identified deferred to follow up visit unless specifically addressed in assesment and plan.    Interpretation of PHQ-9 Total Score   Score Severity   1-4 No Depression   5-9 Mild Depression   10-14 Moderate Depression   15-19 Moderately Severe Depression   20-27 Severe Depression        Current Outpatient Medications   Medication Sig Dispense Refill    fluticasone-salmeterol (WIXELA INHUB) 250-50 MCG/ACT AEROSOL POWDER, BREATH ACTIVATED Inhale 1 Puff every 12 hours.      albuterol (PROVENTIL) 2.5mg/3ml Nebu Soln solution for nebulization Take 3 mL by nebulization every four hours as needed for Shortness of Breath. 90 mL 0    albuterol 108 (90 Base) MCG/ACT Aero Soln inhalation aerosol Inhale 2 Puffs by mouth every four hours as needed for Shortness of Breath. 8.5 g 3    EPINEPHrine (EPIPEN) 0.3 MG/0.3ML Solution Auto-injector solution for injection Inject 0.3 mL into the thigh one time for 1 dose 2 Each 0    sertraline (ZOLOFT) 25 MG tablet Take 1 Tablet by mouth every day. Take with 50 mg tab for a total of 75 mg daily 90 Tablet 3    sertraline (ZOLOFT) 50 MG Tab Take 1 Tablet by mouth every day. Take with 25 mg tab for a total of 75 mg daily 90 Tablet 3    ibuprofen (MOTRIN) 800 MG Tab Take 1 Tablet by mouth every 8 hours. 30 Tablet 1     No current facility-administered medications for this visit.       Medications, past medical history, allergies, and social history have been reviewed and updated.      Objective:       Exam:  /68 (BP Location: Left arm, Patient Position: Sitting, BP Cuff Size: Adult)   Pulse 92   Temp 36.6 °C (97.8 °F) (Temporal)   Resp 16   Ht 1.575 m (5' 2\")   Wt 96.2 kg (212 lb)   SpO2 94%   BMI 38.78 kg/m²  Body mass index is 38.78 kg/m².    Constitutional: Alert. Well appearing. No distress.  Skin: Warm, dry, good turgor, no visible rashes.  Respiratory: Normal " effort.  Neuro: Moves all four extremities. No facial droop.  Psych: Answers questions appropriately. Normal affect and mood.      Assessment & Plan:     30 y.o. female with the following -     1. JUAN (generalized anxiety disorder)  2. Mild episode of recurrent major depressive disorder (HCC)  Worsened recent symptoms secondary to high stress, daughter with multiple recent illnesses and hospital stays.  Continue to see therapist, discussed behavioral strategies, self-care, stress relief practices.  Increase Zoloft to 100 mg daily.  Recheck in 1 month.  - sertraline (ZOLOFT) 100 MG Tab; Take 1 Tablet by mouth every day.  Dispense: 90 Tablet; Refill: 3    3. Well adult exam  - CBC WITH DIFFERENTIAL; Future  - Comp Metabolic Panel; Future  - Lipid Profile; Future         Please note that this note was created using voice recognition software.

## 2025-03-13 ENCOUNTER — RESULTS FOLLOW-UP (OUTPATIENT)
Dept: MEDICAL GROUP | Facility: LAB | Age: 30
End: 2025-03-13
Payer: COMMERCIAL

## 2025-03-13 DIAGNOSIS — E66.812 CLASS 2 SEVERE OBESITY DUE TO EXCESS CALORIES WITH SERIOUS COMORBIDITY AND BODY MASS INDEX (BMI) OF 38.0 TO 38.9 IN ADULT (HCC): ICD-10-CM

## 2025-03-13 DIAGNOSIS — R73.03 PREDIABETES: ICD-10-CM

## 2025-03-13 DIAGNOSIS — E66.01 CLASS 2 SEVERE OBESITY DUE TO EXCESS CALORIES WITH SERIOUS COMORBIDITY AND BODY MASS INDEX (BMI) OF 38.0 TO 38.9 IN ADULT (HCC): ICD-10-CM

## 2025-03-13 DIAGNOSIS — E78.2 MIXED HYPERLIPIDEMIA: ICD-10-CM

## 2025-03-16 LAB — 17OHP SERPL-MCNC: 97.01 NG/DL

## 2025-03-20 LAB — TESTOST SERPL-MCNC: 26 NG/DL (ref 9–55)

## 2025-03-20 NOTE — Clinical Note
REFERRAL APPROVAL NOTICE         Sent on March 19, 2025                   Erica Carrillo  [address] [place] [address]                   Dear Ms. Carrillo,    After a careful review of the medical information and benefit coverage, [facility] has processed your referral. See below for additional details.    If applicable, you must be actively enrolled with your insurance for coverage of the authorized service. If you have any questions regarding your coverage, please contact your insurance directly.    REFERRAL INFORMATION   Referral #:  [id number]  Referred-To Department    Referred-By Provider:  María Hernandez M.D.   [facility]      [address]  [facility] [id number] [phone] [address] [facility] [address] [phone]    Referral Start Date:  03/14/2025  Referral End Date:   03/14/2026             SCHEDULING  If you do not already have an appointment, please call [phone] to make an appointment.     MORE INFORMATION  If you do not already have a [facility] account, sign up at: [email or web address]  You can access your medical information, make appointments, see lab results, billing information, and more.  If you have questions regarding this referral, please contact  the [facility] Referrals department at:             [phone]. Monday - Friday 8:00AM - 5:00PM.     Sincerely,    [facility]

## 2025-04-14 NOTE — PROGRESS NOTES
"Subjective:     CC: Zoloft    HPI:   Chelsea presents today discuss Zoloft refill.  Her psychiatrist had been managing this for anxiety and depression but they recently left the practice.  Symptoms have been well controlled on this for years.  She would like to continue on 70 mg daily.  She does also see a therapist.    Medications, past medical history, allergies, and social history have been reviewed and updated.      Objective:       Exam:  /70 (BP Location: Right arm, Patient Position: Sitting, BP Cuff Size: Adult)   Pulse 72   Temp 36.1 °C (97 °F)   Resp 14   Ht 1.575 m (5' 2\")   Wt 88 kg (194 lb)   LMP 10/27/2021 (Within Weeks)   SpO2 97%   BMI 35.48 kg/m²  Body mass index is 35.48 kg/m².    Constitutional: Alert. Well appearing. No distress.  Skin: Warm, dry, good turgor, no visible rashes.  Eye: Equal, round and reactive to light, conjunctiva clear, lids normal.  Respiratory: Normal effort. Lungs are clear to auscultation bilaterally.  Cardiovascular: Regular rate and rhythm. Normal S1/S2. No murmurs, rubs or gallops.   Neuro: Moves all four extremities. No facial droop.  Psych: Answers questions appropriately. Normal affect and mood.      Assessment & Plan:     27 y.o. female with the following -     1. JUAN (generalized anxiety disorder)  Stable, continue Zoloft.  - sertraline (ZOLOFT) 50 MG Tab; Take 1.5 Tablets by mouth every day for 90 days. 1.5 tabs daily  Dispense: 135 Tablet; Refill: 3    2. Recurrent major depressive disorder, in full remission (HCC)  Stable, continue Zoloft and therapy.  - sertraline (ZOLOFT) 50 MG Tab; Take 1.5 Tablets by mouth every day for 90 days. 1.5 tabs daily  Dispense: 135 Tablet; Refill: 3    3. Need for vaccination  - INFLUENZA VACCINE QUAD INJ (PF)  - Tdap Vaccine =>6YO IM  - 9VHPV Vaccine 2-3 Dose IM  - Pneumococcal Conjugate Vaccine 20-Valent (19 yrs+)    Please note that this note was created using voice recognition software.      " Outpatient counseling encourage  Patient advised to continue multivitamin, folic acid, and thiamine

## 2025-04-15 ENCOUNTER — TELEMEDICINE (OUTPATIENT)
Dept: MEDICAL GROUP | Facility: LAB | Age: 30
End: 2025-04-15
Payer: COMMERCIAL

## 2025-04-15 VITALS — BODY MASS INDEX: 39.01 KG/M2 | WEIGHT: 212 LBS | HEIGHT: 62 IN

## 2025-04-15 DIAGNOSIS — F41.1 GAD (GENERALIZED ANXIETY DISORDER): ICD-10-CM

## 2025-04-15 DIAGNOSIS — R73.03 PREDIABETES: ICD-10-CM

## 2025-04-15 DIAGNOSIS — D72.828 NEUTROPHILIA: ICD-10-CM

## 2025-04-15 DIAGNOSIS — E78.2 MIXED HYPERLIPIDEMIA: ICD-10-CM

## 2025-04-15 DIAGNOSIS — F33.0 MILD EPISODE OF RECURRENT MAJOR DEPRESSIVE DISORDER (HCC): ICD-10-CM

## 2025-04-15 PROCEDURE — 99214 OFFICE O/P EST MOD 30 MIN: CPT | Mod: 95 | Performed by: FAMILY MEDICINE

## 2025-04-15 ASSESSMENT — FIBROSIS 4 INDEX: FIB4 SCORE: 0.24

## 2025-04-15 NOTE — PROGRESS NOTES
"Virtual Visit: Established Patient   This visit was conducted via Teams using secure and encrypted videoconferencing technology.   The patient was in their home in the [facility].    The patient's identity was confirmed and verbal consent was obtained for this virtual visit.     Subjective:   CC:   Chief Complaint   Patient presents with    Follow-Up     1 month follow up on medication and lab results.        Chelsea presents today to follow-up on depression and anxiety and review lab results.  Zoloft increased to 100 mg daily 1 month ago and she does think she has seen some improvement.  She is seeing therapist.  Still having some stressful life events including recent illnesses in her child.    Reviewed: CBC, CMP, TSH, testosterone, A1c    Current medicines (including changes today)  Current Outpatient Medications   Medication Sig Dispense Refill    sertraline (ZOLOFT) 100 MG Tab Take 1 Tablet by mouth every day. 90 Tablet 3    albuterol (PROVENTIL) 2.5mg/3ml Nebu Soln solution for nebulization Take 3 mL by nebulization every four hours as needed for Shortness of Breath. 90 mL 0    albuterol 108 (90 Base) MCG/ACT Aero Soln inhalation aerosol Inhale 2 Puffs by mouth every four hours as needed for Shortness of Breath. 8.5 g 3    EPINEPHrine (EPIPEN) 0.3 MG/0.3ML Solution Auto-injector solution for injection Inject 0.3 mL into the thigh one time for 1 dose 2 Each 0     No current facility-administered medications for this visit.       Patient Active Problem List    Diagnosis Date Noted    JUAN (generalized anxiety disorder) 03/12/2025    Mild episode of recurrent major depressive disorder (HCC) 03/12/2025    Obesity 03/18/2020    Former smoker 03/16/2020    DANISHA (obstructive sleep apnea) 03/16/2020    Mild intermittent asthma without complication 11/22/2019    Other insomnia 11/22/2019        Objective:   Ht 1.575 m (5' 2\")   Wt 96.2 kg (212 lb)   BMI 38.78 kg/m²     Physical Exam:  Constitutional: Alert, no " distress, well-groomed.  Skin: No rashes in visible areas.  Eye: Round. Conjunctiva clear, lids normal. No icterus.   ENMT: Lips pink without lesions, good dentition, moist mucous membranes. Phonation normal.  Neck: No masses, no thyromegaly. Moves freely without pain.  Respiratory: Unlabored respiratory effort, no cough or audible wheeze  Psych: Alert and oriented x3, normal affect and mood.     Assessment and Plan:   The following treatment plan was discussed:     1. JUAN (generalized anxiety disorder)  2. Mild episode of recurrent major depressive disorder (HCC)  Mild improvement, continue Zoloft 100 mg daily, continue with therapy, behavioral strategies.    3. Neutrophilia  Recheck CBC.  - CBC WITH DIFFERENTIAL; Future    4. Prediabetes  5. Mixed hyperlipidemia  Discussed diet and lifestyle measures.  She is planning to visit with nutritionist.

## 2025-04-24 DIAGNOSIS — J45.20 MILD INTERMITTENT ASTHMA WITHOUT COMPLICATION: ICD-10-CM

## 2025-04-24 DIAGNOSIS — F41.1 GAD (GENERALIZED ANXIETY DISORDER): ICD-10-CM

## 2025-04-25 PROCEDURE — RXMED WILLOW AMBULATORY MEDICATION CHARGE: Performed by: FAMILY MEDICINE

## 2025-04-25 RX ORDER — ALBUTEROL SULFATE 90 UG/1
2 INHALANT RESPIRATORY (INHALATION) EVERY 4 HOURS PRN
Qty: 8.5 G | Refills: 3 | Status: SHIPPED | OUTPATIENT
Start: 2025-04-25

## 2025-04-25 RX ORDER — SERTRALINE HYDROCHLORIDE 100 MG/1
100 TABLET, FILM COATED ORAL DAILY
Qty: 90 TABLET | Refills: 3 | Status: SHIPPED | OUTPATIENT
Start: 2025-04-25

## 2025-04-25 NOTE — TELEPHONE ENCOUNTER
Received request via: Patient    Was the patient seen in the last year in this department? Yes    Does the patient have an active prescription (recently filled or refills available) for medication(s) requested? No    Pharmacy Name: [facility]     Does the patient have [facility] Plus and need 100-day supply? (This applies to ALL medications) Patient does not have SCP

## 2025-04-25 NOTE — TELEPHONE ENCOUNTER
Received request via: Pharmacy    Was the patient seen in the last year in this department? Yes    Does the patient have an active prescription (recently filled or refills available) for medication(s) requested? No    Pharmacy Name: [facility]     Does the patient have group home Plus and need 100-day supply? (This applies to ALL medications) Patient does not have SCP

## 2025-04-26 ENCOUNTER — PHARMACY VISIT (OUTPATIENT)
Dept: PHARMACY | Facility: MEDICAL CENTER | Age: 30
End: 2025-04-26
Payer: COMMERCIAL

## 2025-05-13 ENCOUNTER — HOSPITAL ENCOUNTER (OUTPATIENT)
Dept: LAB | Facility: MEDICAL CENTER | Age: 30
End: 2025-05-13
Attending: FAMILY MEDICINE
Payer: COMMERCIAL

## 2025-05-13 DIAGNOSIS — D72.828 NEUTROPHILIA: ICD-10-CM

## 2025-05-13 LAB
BASOPHILS # BLD AUTO: 0.4 % (ref 0–1.8)
BASOPHILS # BLD: 0.05 K/UL (ref 0–0.12)
EOSINOPHIL # BLD AUTO: 0.35 K/UL (ref 0–0.51)
EOSINOPHIL NFR BLD: 2.8 % (ref 0–6.9)
ERYTHROCYTE [DISTWIDTH] IN BLOOD BY AUTOMATED COUNT: 46.5 FL (ref 35.9–50)
HCT VFR BLD AUTO: 43.5 % (ref 37–47)
HGB BLD-MCNC: 14.1 G/DL (ref 12–16)
IMM GRANULOCYTES # BLD AUTO: 0.04 K/UL (ref 0–0.11)
IMM GRANULOCYTES NFR BLD AUTO: 0.3 % (ref 0–0.9)
LYMPHOCYTES # BLD AUTO: 2.38 K/UL (ref 1–4.8)
LYMPHOCYTES NFR BLD: 19.3 % (ref 22–41)
MCH RBC QN AUTO: 27.3 PG (ref 27–33)
MCHC RBC AUTO-ENTMCNC: 32.4 G/DL (ref 32.2–35.5)
MCV RBC AUTO: 84.3 FL (ref 81.4–97.8)
MONOCYTES # BLD AUTO: 0.66 K/UL (ref 0–0.85)
MONOCYTES NFR BLD AUTO: 5.4 % (ref 0–13.4)
NEUTROPHILS # BLD AUTO: 8.84 K/UL (ref 1.82–7.42)
NEUTROPHILS NFR BLD: 71.8 % (ref 44–72)
NRBC # BLD AUTO: 0 K/UL
NRBC BLD-RTO: 0 /100 WBC (ref 0–0.2)
PLATELET # BLD AUTO: 430 K/UL (ref 164–446)
PMV BLD AUTO: 8.8 FL (ref 9–12.9)
RBC # BLD AUTO: 5.16 M/UL (ref 4.2–5.4)
WBC # BLD AUTO: 12.3 K/UL (ref 4.8–10.8)

## 2025-05-13 PROCEDURE — 85025 COMPLETE CBC W/AUTO DIFF WBC: CPT

## 2025-05-13 PROCEDURE — 36415 COLL VENOUS BLD VENIPUNCTURE: CPT

## 2025-05-15 ENCOUNTER — RESULTS FOLLOW-UP (OUTPATIENT)
Dept: MEDICAL GROUP | Facility: LAB | Age: 30
End: 2025-05-15
Payer: COMMERCIAL

## 2025-05-15 DIAGNOSIS — L68.0 HIRSUTISM: ICD-10-CM

## 2025-05-15 DIAGNOSIS — D72.828 NEUTROPHILIA: Primary | ICD-10-CM

## 2025-05-15 RX ORDER — DEXAMETHASONE 0.5 MG/1
1 TABLET ORAL ONCE
Qty: 2 TABLET | Refills: 0 | Status: SHIPPED | OUTPATIENT
Start: 2025-05-15 | End: 2025-05-15

## 2025-05-19 ENCOUNTER — HOSPITAL ENCOUNTER (OUTPATIENT)
Dept: LAB | Facility: MEDICAL CENTER | Age: 30
End: 2025-05-19
Attending: FAMILY MEDICINE
Payer: COMMERCIAL

## 2025-05-19 DIAGNOSIS — D72.828 NEUTROPHILIA: ICD-10-CM

## 2025-05-19 DIAGNOSIS — L68.0 HIRSUTISM: ICD-10-CM

## 2025-05-19 DIAGNOSIS — Z91.018 TREE NUT ALLERGY: ICD-10-CM

## 2025-05-19 LAB — CORTIS SERPL-MCNC: 0.4 UG/DL (ref 0–23)

## 2025-05-19 PROCEDURE — RXMED WILLOW AMBULATORY MEDICATION CHARGE: Performed by: FAMILY MEDICINE

## 2025-05-19 PROCEDURE — 36415 COLL VENOUS BLD VENIPUNCTURE: CPT

## 2025-05-19 PROCEDURE — 82533 TOTAL CORTISOL: CPT

## 2025-05-19 PROCEDURE — 80299 QUANTITATIVE ASSAY DRUG: CPT

## 2025-05-20 ENCOUNTER — RESULTS FOLLOW-UP (OUTPATIENT)
Dept: MEDICAL GROUP | Facility: LAB | Age: 30
End: 2025-05-20
Payer: COMMERCIAL

## 2025-05-20 PROCEDURE — RXMED WILLOW AMBULATORY MEDICATION CHARGE: Performed by: FAMILY MEDICINE

## 2025-05-20 RX ORDER — EPINEPHRINE 0.3 MG/.3ML
INJECTION SUBCUTANEOUS
Qty: 2 EACH | Refills: 0 | Status: SHIPPED | OUTPATIENT
Start: 2025-05-20

## 2025-05-21 PROCEDURE — RXMED WILLOW AMBULATORY MEDICATION CHARGE: Performed by: FAMILY MEDICINE

## 2025-05-22 LAB — DEXAMETHASONE SERPL-MCNC: 405.2 NG/DL

## 2025-05-29 ENCOUNTER — PHARMACY VISIT (OUTPATIENT)
Dept: PHARMACY | Facility: MEDICAL CENTER | Age: 30
End: 2025-05-29
Payer: COMMERCIAL

## 2025-07-17 PROCEDURE — RXMED WILLOW AMBULATORY MEDICATION CHARGE: Performed by: FAMILY MEDICINE

## 2025-07-18 ENCOUNTER — PHARMACY VISIT (OUTPATIENT)
Dept: PHARMACY | Facility: MEDICAL CENTER | Age: 30
End: 2025-07-18
Payer: COMMERCIAL

## 2025-07-23 ENCOUNTER — OFFICE VISIT (OUTPATIENT)
Dept: SLEEP MEDICINE | Facility: MEDICAL CENTER | Age: 30
End: 2025-07-23
Attending: NURSE PRACTITIONER
Payer: COMMERCIAL

## 2025-07-23 VITALS
SYSTOLIC BLOOD PRESSURE: 108 MMHG | BODY MASS INDEX: 40.15 KG/M2 | WEIGHT: 218.2 LBS | HEIGHT: 62 IN | RESPIRATION RATE: 16 BRPM | HEART RATE: 81 BPM | OXYGEN SATURATION: 94 % | DIASTOLIC BLOOD PRESSURE: 70 MMHG

## 2025-07-23 DIAGNOSIS — G47.33 OSA (OBSTRUCTIVE SLEEP APNEA): Primary | ICD-10-CM

## 2025-07-23 PROCEDURE — 3074F SYST BP LT 130 MM HG: CPT | Performed by: NURSE PRACTITIONER

## 2025-07-23 PROCEDURE — 99214 OFFICE O/P EST MOD 30 MIN: CPT | Performed by: NURSE PRACTITIONER

## 2025-07-23 PROCEDURE — 3078F DIAST BP <80 MM HG: CPT | Performed by: NURSE PRACTITIONER

## 2025-07-23 ASSESSMENT — FIBROSIS 4 INDEX: FIB4 SCORE: 0.25

## 2025-07-24 NOTE — PROGRESS NOTES
"Chief Complaint   Patient presents with    Follow-Up     SLEEP - ESTABLISHED PT CHART PREP COMPLETED ON 07/10/2025    Last Office Visit 07/29/2024 [facility]    1st Compliance: No     DME: [facility]    Settings:autoCPAP 5-15 cmH2O.       Wireless Data? YES, If not wireless contact pt to bring in device. Resmed       HPI:  Chelsea Carrillo is a 30 y.o. year old female here today for follow-up on DANISHA annual visit.  Last seen 7/29/2024.. PMH includes asthma, chronic insomnia, former smoker, obesity, tonsillectomy, anxiety, depression, heart murmur.      Patient has a ResMed auto CPAP machine that was obtained over April 2020.  Using a ResMed AirFit N30 I.  Denies any difficulty with mask fit or pressures or aerophagia or dry mouth.  Averages 6 to 7 hours of sleep.  Overall notes improvement in sleep quality when using CPAP.    30-day compliance shows 100% use with an average time of 8 hours and 9 minutes and a residual AHI of 1.5 with no evidence of mask leak.    ROS: As per HPI and otherwise negative if not stated.    Past Medical History[1]    Past Surgical History[2]    Family History   Problem Relation Age of Onset    Sleep Apnea Mother     Sleep Apnea Father        Allergies as of 07/23/2025 - Reviewed 07/23/2025   Allergen Reaction Noted    Penicillin g      Tree nuts food allergy Anaphylaxis, Anxiety, Hives, Itching, Shortness of Breath, and Swelling 1996    Pcn [penicillins]  12/10/2020        Vitals:  /70 (BP Location: Left arm, Patient Position: Sitting, BP Cuff Size: Adult)   Pulse 81   Resp 16   Ht 1.575 m (5' 2\")   Wt 99 kg (218 lb 3.2 oz)   SpO2 94%     Current medications as of today Current Medications[3]      Physical Exam:   Gen:           Alert and oriented, No apparent distress. Mood and affect appropriate, normal interaction with examiner.  Eyes:          PERRL, EOM intact, sclere white, conjunctive moist.  Ears:          Not examined.   Hearing:     Grossly intact.  Nose:        "   Normal, no lesions or deformities.  Dentition:    Good dentition.  Oropharynx:   Tongue normal, posterior pharynx without erythema or exudate.  Neck:        Supple, trachea midline, no masses.  Respiratory Effort: No intercostal retractions or use of accessory muscles.   Lung Auscultation:      Clear to auscultation bilaterally; no rales, rhonchi or wheezing.  CV:            Regular rate and rhythm. No murmurs, rubs or gallops.  Abd:           Not examined.   Lymphadenopathy: Not examined.  Gait and Station: Normal.  Digits and Nails: No clubbing, cyanosis, petechiae, or nodes.   Cranial Nerves: II-XII grossly intact.  Skin:        No rashes, lesions or ulcers noted.               Ext:           No cyanosis or edema.      Assessment:  1. DANISHA (obstructive sleep apnea)  DME Mask and Supplies        Plan:  Using and benefiting from CPAP therapy.  Compliance shows adequate use and control of DANISHA.  Order placed for mask and supplies which we did for 1 year.  Advise annual follow-up    Please note that this dictation was created using voice recognition software. I have made every reasonable attempt to correct obvious errors, but it is possible there are errors of grammar and possibly content that I did not discover before finalizing the note.         [1]   Past Medical History:  Diagnosis Date    Anxiety     Asthma     Bronchitis     Depression     Heart murmur     Influenza     Nasal drainage     Pneumonia    [2]   Past Surgical History:  Procedure Laterality Date    PRIMARY C SECTION N/A 2024    Procedure:  SECTION, PRIMARY;  Surgeon: Nahomi Franco M.D.;  Location: [facility];  Service: Obstetrics    TONSILLECTOMY     [3]   Current Outpatient Medications   Medication Sig Dispense Refill    EPINEPHrine (EPIPEN) 0.3 MG/0.3ML Solution Auto-injector solution for injection Inject 0.3 mL into the thigh one time for 1 dose 2 Each 0    albuterol 108 (90 Base) MCG/ACT Aero Soln inhalation  aerosol Inhale 2 Puffs by mouth every four hours as needed for Shortness of Breath. 8.5 g 3    sertraline (ZOLOFT) 100 MG Tab Take 1 Tablet by mouth every day. 90 Tablet 3    albuterol (PROVENTIL) 2.5mg/3ml Nebu Soln solution for nebulization Take 3 mL by nebulization every four hours as needed for Shortness of Breath. 90 mL 0     No current facility-administered medications for this visit.

## 2025-08-19 ENCOUNTER — TELEPHONE (OUTPATIENT)
Dept: SLEEP MEDICINE | Facility: MEDICAL CENTER | Age: 30
End: 2025-08-19
Payer: COMMERCIAL

## (undated) DEVICE — TRAY SPINAL ANESTHESIA NON-SAFETY (10/CA)

## (undated) DEVICE — CANISTER SUCTION 3000ML MECHANICAL FILTER AUTO SHUTOFF MEDI-VAC NONSTERILE LF DISP  (40EA/CA)

## (undated) DEVICE — SUTURE 0 VICRYL PLUS CT-1 - 36 INCH (36/BX)

## (undated) DEVICE — KIT  I.V. START (100EA/CA)

## (undated) DEVICE — CANNULA O2 COMFORT SOFT EAR ADULT 7 FT TUBING (50/CA)

## (undated) DEVICE — RETRACTOR O C SECTION LRY - (5/BX)

## (undated) DEVICE — WATER IRRIGATION STERILE 1000ML (12EA/CA)

## (undated) DEVICE — SET EXTENSION WITH 2 PORTS (48EA/CA) ***PART #2C8610 IS A SUBSTITUTE*****

## (undated) DEVICE — SODIUM CHL IRRIGATION 0.9% 1000ML (12EA/CA)

## (undated) DEVICE — BLANKET UNDERBODY ADULT - (10/CA)

## (undated) DEVICE — SUTURE ABSORBABLE MONOFILAMENT VIOLET MONOCRYL CT-1 L36 IN (36EA/BX)

## (undated) DEVICE — PACK ROOM TURNOVER L&D (12/CA)

## (undated) DEVICE — CHLORAPREP 26 ML APPLICATOR - ORANGE TINT(25/CA)

## (undated) DEVICE — GLOVE BIOGEL SZ 6.5 SURGICAL PF LTX (50PR/BX 4BX/CA)

## (undated) DEVICE — BAG SPONGE COUNT 10.25 X 32 - BLUE (250/CA)

## (undated) DEVICE — BLANKET STERILE CHICKIE FOR L&D (100/CA)

## (undated) DEVICE — KIT SKIN NOSE AND MOUTH PRE-OP (20/CA)

## (undated) DEVICE — SUTURE 2-0 VICRYL PLUS CT-1 36 (36PK/BX)"

## (undated) DEVICE — PACK C-SECTION (2EA/CA)

## (undated) DEVICE — GLOVE BIOGEL INDICATOR SZ 6.5 SURGICAL PF LTX - (50PR/BX 4BX/CA)

## (undated) DEVICE — CATHETER IV NON-SAFETY 18 GA X 1 1/4 (50/BX 4BX/CA)

## (undated) DEVICE — TUBING CLEARLINK DUO-VENT - C-FLO (48EA/CA)

## (undated) DEVICE — SLEEVE, SEQUENTIAL CALF REG

## (undated) DEVICE — SUTURE 4-0 27IN VCRL PLUS ANTI (36PK/BX)

## (undated) DEVICE — DRESSING POST OP BORDER 4 X 10 (5EA/BX)

## (undated) DEVICE — SPONGE SURGICAL PVP  IODINE L8 IN (20EA/CA)

## (undated) DEVICE — PAD LAP STERILE 18 X 18 - (5/PK 40PK/CA)

## (undated) DEVICE — SUTUREABS02-0 CT1 27IN - (36EA/BX)

## (undated) DEVICE — CANISTER SUCTION RIGID RED 1500CC (40EA/CA)

## (undated) DEVICE — PLUMEPEN ULTRA 3/8 IN X 10 FT HOSE (20EA/CA)

## (undated) DEVICE — SOLUTION PLASMA-LYTE PH 7.4 INJ 1000ML  (14EA/CA)

## (undated) DEVICE — CLOSURE SKIN STRIP 1/2 X 4 IN - (STERI STRIP) (50/BX 4BX/CA)

## (undated) DEVICE — HEAD HOLDER JUNIOR/ADULT